# Patient Record
Sex: MALE | Race: BLACK OR AFRICAN AMERICAN | Employment: OTHER | URBAN - METROPOLITAN AREA
[De-identification: names, ages, dates, MRNs, and addresses within clinical notes are randomized per-mention and may not be internally consistent; named-entity substitution may affect disease eponyms.]

---

## 2018-09-07 ENCOUNTER — HOSPITAL ENCOUNTER (OUTPATIENT)
Dept: GENERAL RADIOLOGY | Age: 68
Discharge: HOME | End: 2018-09-07
Payer: MEDICARE

## 2018-09-07 DIAGNOSIS — M54.2 NECK PAIN: ICD-10-CM

## 2018-09-07 PROCEDURE — 72050 X-RAY EXAM NECK SPINE 4/5VWS: CPT

## 2020-11-08 ENCOUNTER — HOSPITAL ENCOUNTER (OUTPATIENT)
Dept: PREADMISSION TESTING | Age: 70
Discharge: HOME | End: 2020-11-08
Attending: INTERNAL MEDICINE
Payer: MEDICARE

## 2020-11-08 ENCOUNTER — TRANSCRIBE ORDER (OUTPATIENT)
Dept: REGISTRATION | Age: 70
End: 2020-11-08

## 2020-11-08 DIAGNOSIS — Z01.812 PRE-PROCEDURE LAB EXAM: Primary | ICD-10-CM

## 2020-11-08 DIAGNOSIS — Z01.812 PRE-PROCEDURE LAB EXAM: ICD-10-CM

## 2020-11-08 PROCEDURE — 87635 SARS-COV-2 COVID-19 AMP PRB: CPT

## 2020-11-09 LAB — SARS-COV-2, COV2NT: NOT DETECTED

## 2020-11-11 ENCOUNTER — HOSPITAL ENCOUNTER (OUTPATIENT)
Age: 70
Setting detail: OUTPATIENT SURGERY
Discharge: HOME | End: 2020-11-11
Attending: INTERNAL MEDICINE | Admitting: INTERNAL MEDICINE
Payer: MEDICARE

## 2020-11-11 ENCOUNTER — ANESTHESIA EVENT (OUTPATIENT)
Dept: ENDOSCOPY | Age: 70
End: 2020-11-11
Payer: MEDICARE

## 2020-11-11 ENCOUNTER — ANESTHESIA (OUTPATIENT)
Dept: ENDOSCOPY | Age: 70
End: 2020-11-11
Payer: MEDICARE

## 2020-11-11 VITALS
SYSTOLIC BLOOD PRESSURE: 120 MMHG | WEIGHT: 196.8 LBS | BODY MASS INDEX: 29.15 KG/M2 | DIASTOLIC BLOOD PRESSURE: 63 MMHG | OXYGEN SATURATION: 94 % | HEART RATE: 67 BPM | RESPIRATION RATE: 20 BRPM | HEIGHT: 69 IN | TEMPERATURE: 96.9 F

## 2020-11-11 PROCEDURE — 76060000031 HC ANESTHESIA FIRST 0.5 HR: Performed by: INTERNAL MEDICINE

## 2020-11-11 PROCEDURE — 2709999900 HC NON-CHARGEABLE SUPPLY: Performed by: INTERNAL MEDICINE

## 2020-11-11 PROCEDURE — 76040000019: Performed by: INTERNAL MEDICINE

## 2020-11-11 PROCEDURE — 77030013992 HC SNR POLYP ENDOSC BSC -B: Performed by: INTERNAL MEDICINE

## 2020-11-11 PROCEDURE — 74011250636 HC RX REV CODE- 250/636: Performed by: NURSE ANESTHETIST, CERTIFIED REGISTERED

## 2020-11-11 PROCEDURE — 74011000250 HC RX REV CODE- 250: Performed by: NURSE ANESTHETIST, CERTIFIED REGISTERED

## 2020-11-11 PROCEDURE — 74011250637 HC RX REV CODE- 250/637: Performed by: INTERNAL MEDICINE

## 2020-11-11 PROCEDURE — 88305 TISSUE EXAM BY PATHOLOGIST: CPT

## 2020-11-11 RX ORDER — MIDAZOLAM HYDROCHLORIDE 1 MG/ML
.25-5 INJECTION, SOLUTION INTRAMUSCULAR; INTRAVENOUS
Status: DISCONTINUED | OUTPATIENT
Start: 2020-11-11 | End: 2020-11-11 | Stop reason: HOSPADM

## 2020-11-11 RX ORDER — SODIUM CHLORIDE 0.9 % (FLUSH) 0.9 %
5-40 SYRINGE (ML) INJECTION AS NEEDED
Status: DISCONTINUED | OUTPATIENT
Start: 2020-11-11 | End: 2020-11-11 | Stop reason: HOSPADM

## 2020-11-11 RX ORDER — EPINEPHRINE 0.1 MG/ML
1 INJECTION INTRACARDIAC; INTRAVENOUS
Status: DISCONTINUED | OUTPATIENT
Start: 2020-11-11 | End: 2020-11-11 | Stop reason: HOSPADM

## 2020-11-11 RX ORDER — FLUMAZENIL 0.1 MG/ML
0.2 INJECTION INTRAVENOUS
Status: DISCONTINUED | OUTPATIENT
Start: 2020-11-11 | End: 2020-11-11 | Stop reason: HOSPADM

## 2020-11-11 RX ORDER — FENTANYL CITRATE 50 UG/ML
12.5-2 INJECTION, SOLUTION INTRAMUSCULAR; INTRAVENOUS
Status: DISCONTINUED | OUTPATIENT
Start: 2020-11-11 | End: 2020-11-11 | Stop reason: HOSPADM

## 2020-11-11 RX ORDER — PROPOFOL 10 MG/ML
INJECTION, EMULSION INTRAVENOUS AS NEEDED
Status: DISCONTINUED | OUTPATIENT
Start: 2020-11-11 | End: 2020-11-11 | Stop reason: HOSPADM

## 2020-11-11 RX ORDER — ATROPINE SULFATE 0.1 MG/ML
0.5 INJECTION INTRAVENOUS
Status: DISCONTINUED | OUTPATIENT
Start: 2020-11-11 | End: 2020-11-11 | Stop reason: HOSPADM

## 2020-11-11 RX ORDER — SODIUM CHLORIDE 9 MG/ML
INJECTION, SOLUTION INTRAVENOUS
Status: DISCONTINUED | OUTPATIENT
Start: 2020-11-11 | End: 2020-11-11 | Stop reason: HOSPADM

## 2020-11-11 RX ORDER — LIDOCAINE HYDROCHLORIDE 20 MG/ML
INJECTION, SOLUTION EPIDURAL; INFILTRATION; INTRACAUDAL; PERINEURAL AS NEEDED
Status: DISCONTINUED | OUTPATIENT
Start: 2020-11-11 | End: 2020-11-11 | Stop reason: HOSPADM

## 2020-11-11 RX ORDER — SODIUM CHLORIDE 9 MG/ML
75 INJECTION, SOLUTION INTRAVENOUS CONTINUOUS
Status: DISCONTINUED | OUTPATIENT
Start: 2020-11-11 | End: 2020-11-11 | Stop reason: HOSPADM

## 2020-11-11 RX ORDER — DEXTROMETHORPHAN/PSEUDOEPHED 2.5-7.5/.8
1.2 DROPS ORAL
Status: DISCONTINUED | OUTPATIENT
Start: 2020-11-11 | End: 2020-11-11 | Stop reason: HOSPADM

## 2020-11-11 RX ORDER — SODIUM CHLORIDE 0.9 % (FLUSH) 0.9 %
5-40 SYRINGE (ML) INJECTION EVERY 8 HOURS
Status: DISCONTINUED | OUTPATIENT
Start: 2020-11-11 | End: 2020-11-11 | Stop reason: HOSPADM

## 2020-11-11 RX ORDER — NALOXONE HYDROCHLORIDE 0.4 MG/ML
0.4 INJECTION, SOLUTION INTRAMUSCULAR; INTRAVENOUS; SUBCUTANEOUS
Status: DISCONTINUED | OUTPATIENT
Start: 2020-11-11 | End: 2020-11-11 | Stop reason: HOSPADM

## 2020-11-11 RX ADMIN — SODIUM CHLORIDE: 900 INJECTION, SOLUTION INTRAVENOUS at 10:14

## 2020-11-11 RX ADMIN — PROPOFOL 150 MG: 10 INJECTION, EMULSION INTRAVENOUS at 10:28

## 2020-11-11 RX ADMIN — PROPOFOL 50 MG: 10 INJECTION, EMULSION INTRAVENOUS at 10:34

## 2020-11-11 RX ADMIN — LIDOCAINE HYDROCHLORIDE 60 MG: 20 INJECTION, SOLUTION EPIDURAL; INFILTRATION; INTRACAUDAL; PERINEURAL at 10:28

## 2020-11-11 RX ADMIN — PROPOFOL 50 MG: 10 INJECTION, EMULSION INTRAVENOUS at 10:38

## 2020-11-11 NOTE — ANESTHESIA PREPROCEDURE EVALUATION
Relevant Problems   No relevant active problems       Anesthetic History   No history of anesthetic complications            Review of Systems / Medical History  Patient summary reviewed, nursing notes reviewed and pertinent labs reviewed    Pulmonary  Within defined limits                 Neuro/Psych   Within defined limits    CVA       Cardiovascular  Within defined limits            Hyperlipidemia         GI/Hepatic/Renal  Within defined limits              Endo/Other  Within defined limits  Diabetes         Other Findings              Physical Exam    Airway  Mallampati: II  TM Distance: > 6 cm  Neck ROM: normal range of motion   Mouth opening: Normal     Cardiovascular  Regular rate and rhythm,  S1 and S2 normal,  no murmur, click, rub, or gallop             Dental  No notable dental hx       Pulmonary  Breath sounds clear to auscultation               Abdominal  GI exam deferred       Other Findings            Anesthetic Plan    ASA: 3  Anesthesia type: MAC            Anesthetic plan and risks discussed with: Patient

## 2020-11-11 NOTE — ANESTHESIA POSTPROCEDURE EVALUATION
Procedure(s):  COLONOSCOPY  ENDOSCOPIC POLYPECTOMY. MAC    <BSHSIANPOST>    INITIAL Post-op Vital signs: No vitals data found for the desired time range. Were these all sent to me already?  SM

## 2020-11-11 NOTE — PROGRESS NOTES

## 2020-11-11 NOTE — PROCEDURES
[address], [address] [place] [address]      Colonoscopy Operative Report    Sanjeevgianni Cartagena  [id number]  1950      Procedure Type:   Colonoscopy with polypectomy (cold snare)     Indications:  Personal history of colon polyps. Last colonoscopy 2015     Pre-operative Diagnosis: see indication above    Post-operative Diagnosis:  See findings below    :  Gray Still MD    Surgical Assistant: None    Implants:  None    Referring Provider: Je Farnsworth MD      Sedation:  MAC anesthesia      Procedure Details:  After informed consent was obtained with all risks and benefits of procedure explained and preoperative exam completed, the patient was taken to the endoscopy suite and placed in the left lateral decubitus position. Upon sequential sedation as per above, a digital rectal exam was performed demonstrating internal hemorrhoids. The Olympus videocolonoscope  was inserted in the rectum and carefully advanced to the cecum, which was identified by the ileocecal valve and appendiceal orifice. The cecum was identified by the ileocecal valve and appendiceal orifice. The quality of preparation was fair, however adequate views obtained with lavage. The colonoscope was slowly withdrawn with careful evaluation between folds. Retroflexion in the rectum was completed . Findings:   Rectum: Grade 2 internal hemorrhoid(s); Sigmoid:     - Diverticulosis  Descending Colon:     - Diverticulosis  Transverse Colon: 2  Sessile polyp(s), the largest 7 mm in size;  Ascending Colon: normal  Cecum: normal  Terminal Ileum: not intubated    Specimen Removed:  specimen #1, 6-7 mm in size, located in the transverse colon removed by cold snare and retrieved for pathology    Complications: None.      EBL:  Minimal.    Impression:      -Two transverse colon polyps (6-7 mm in size), removed with cold snare and sent for pathology  -Moderate diverticulosis throughout the colon, predominantly in the left colon  -Small internal hemorrhoids    Recommendations:   -Await pathology results  -Repeat colonoscopy in 5 yrs based on pathology of polyps  -High fiber diet, start fiber supplement daily  -Call my office if any issues with hemorrhoids, bleeding, itching, weeping, pain.        Signed By: Veronica Fernando MD     11/11/2020  10:47 AM

## 2020-11-11 NOTE — DISCHARGE INSTRUCTIONS
[facility] [place]    COLON DISCHARGE INSTRUCTIONS    Redd Vieira  [id number]  1950    Discomfort:  Redness at IV site- apply warm compress to area; if redness or soreness persist- contact your physician  There may be a slight amount of blood passed from the rectum  Gaseous discomfort- walking, belching will help relieve any discomfort  You may not operate a vehicle for 12 hours  You may not engage in an occupation involving machinery or appliances for rest of today  You may not drink alcoholic beverages for at least 12 hours  Avoid making any critical decisions for at least 24 hour  DIET:  High fiber diet (see below)     ACTIVITY:  You may  resume your normal daily activities it is recommended that you spend the remainder of the day resting -  avoid any strenuous activity. CALL M.D. ANY SIGN OF:   Increasing pain, nausea, vomiting  Abdominal distension (swelling)  New increased bleeding (oral or rectal)  Fever (chills)  Pain in chest area  Bloody discharge from nose or mouth  Shortness of breath      Follow-up Instructions:   Call Dr. Colie Fothergill for any questions or problems at [phone]      ENDOSCOPY FINDINGS:    Impression:      -Two transverse colon polyps (6-7 mm in size), removed with cold snare and sent for pathology  -Moderate diverticulosis throughout the colon, predominantly in the left colon  -Small internal hemorrhoids    Recommendations:   -Await pathology results  -Repeat colonoscopy in 5 yrs based on pathology of polyps  -High fiber diet, start fiber supplement daily  -Call my office if any issues with hemorrhoids, bleeding, itching, weeping, pain.        Signed By: Susana James MD     11/11/2020  10:51 AM       DISCHARGE SUMMARY from Nurse    The following personal items collected during your admission are returned to you:   Dental Appliance: Dental Appliances: Lowers, Uppers  Vision: Visual Aid: At home  Hearing Aid:    Jewelry: Clothing:    Other Valuables:    Valuables sent to safe:        Learning About Coronavirus (COVID-19)  Coronavirus [phone]): Overview  What is coronavirus (COVID-19)? The coronavirus disease (COVID-19) is caused by a virus. It is an illness that was first found in [place], [place], in December 2019. It has since spread worldwide. The virus can cause fever, cough, and trouble breathing. In severe cases, it can cause pneumonia and make it hard to breathe without help. It can cause death. Coronaviruses are a large group of viruses. They cause the common cold. They also cause more serious illnesses like Middle East respiratory syndrome (MERS) and severe acute respiratory syndrome (SARS). COVID-19 is caused by a novel coronavirus. That means it's a new type that has not been seen in people before. This virus spreads person-to-person through droplets from coughing and sneezing. It can also spread when you are close to someone who is infected. And it can spread when you touch something that has the virus on it, such as a doorknob or a tabletop. What can you do to protect yourself from coronavirus (COVID-19)? The best way to protect yourself from getting sick is to:  · Avoid areas where there is an outbreak. · Avoid contact with people who may be infected. · Wash your hands often with soap or alcohol-based hand sanitizers. · Avoid crowds and try to stay at least 6 feet away from other people. · Wash your hands often, especially after you cough or sneeze. Use soap and water, and scrub for at least 20 seconds. If soap and water aren't available, use an alcohol-based hand . · Avoid touching your mouth, nose, and eyes. What can you do to avoid spreading the virus to others? To help avoid spreading the virus to others:  · Cover your mouth with a tissue when you cough or sneeze. Then throw the tissue in the trash. · Use a disinfectant to clean things that you touch often.   · Stay home if you are sick or have been exposed to the virus. Don't go to school, work, or public areas. And don't use public transportation. · If you are sick:  ? Leave your home only if you need to get medical care. But call the doctor's office first so they know you're coming. And wear a face mask, if you have one.  ? If you have a face mask, wear it whenever you're around other people. It can help stop the spread of the virus when you cough or sneeze. ? Clean and disinfect your home every day. Use household  and disinfectant wipes or sprays. Take special care to clean things that you grab with your hands. These include doorknobs, remote controls, phones, and handles on your refrigerator and microwave. And don't forget countertops, tabletops, bathrooms, and computer keyboards. When to call for help  Call 911 anytime you think you may need emergency care. For example, call if:  · You have severe trouble breathing. (You can't talk at all.)  · You have constant chest pain or pressure. · You are severely dizzy or lightheaded. · You are confused or can't think clearly. · Your face and lips have a blue color. · You pass out (lose consciousness) or are very hard to wake up. Call your doctor now if you develop symptoms such as:  · Shortness of breath. · Fever. · Cough. If you need to get care, call ahead to the doctor's office for instructions before you go. Make sure you wear a face mask, if you have one, to prevent exposing other people to the virus. Where can you get the latest information? The following health organizations are tracking and studying this virus. Their websites contain the most up-to-date information. Lizzy Jo also learn what to do if you think you may have been exposed to the virus. · U.S. Centers for Disease Control and Prevention (CDC): The CDC provides updated news about the disease and travel advice. The website also tells you how to prevent the spread of infection.  www.cdc.gov  · World Health Organization [facility]): WHO offers information about the virus outbreaks. WHO also has travel advice. www.who.int  Current as of: April 1, 2020               Content Version: 12.4  © 2006-2020 Healthwise, Incorporated. Care instructions adapted under license by your healthcare professional. If you have questions about a medical condition or this instruction, always ask your healthcare professional. [address] any warranty or liability for your use of this information. Patient Education        High-Fiber Diet: Care Instructions  Your Care Instructions     A high-fiber diet may help you relieve constipation and feel less bloated. Your doctor and dietitian will help you make a high-fiber eating plan based on your personal needs. The plan will include the things you like to eat. It will also make sure that you get 30 grams of fiber a day. Before you make changes to the way you eat, be sure to talk with your doctor or dietitian. Follow-up care is a key part of your treatment and safety. Be sure to make and go to all appointments, and call your doctor if you are having problems. It's also a good idea to know your test results and keep a list of the medicines you take. How can you care for yourself at home? · You can increase how much fiber you get if you eat more of certain foods. These foods include:  ? Whole-grain breads and cereals. ? Fruits, such as pears, apples, and peaches. Eat the skins, peels, and seeds, if you can.  ? Vegetables, such as broccoli, cabbage, spinach, carrots, asparagus, and squash. ? Starchy vegetables. These include potatoes with skins, kidney beans, and lima beans. · Take a fiber supplement every day if your doctor recommends it. Examples are Benefiber, Citrucel, FiberCon, and Metamucil. Ask your doctor how much to take. · Drink plenty of fluids, enough so that your urine is light yellow or clear like water.  If you have kidney, heart, or liver disease and have to limit fluids, talk with your doctor before you increase the amount of fluids you drink. · Get some exercise every day. Exercise helps stool move through the colon. It also helps prevent constipation. · Keep a food diary. Try to notice and write down what foods cause gas, pain, or other symptoms. Then you can avoid these foods. Where can you learn more? Go to http://www.gray.com/  Enter R242 in the search box to learn more about \"High-Fiber Diet: Care Instructions. \"  Current as of: August 22, 2019               Content Version: 12.6  © 3097-9629 [employer]. Care instructions adapted under license by [employer] (which disclaims liability or warranty for this information). If you have questions about a medical condition or this instruction, always ask your healthcare professional. [address] any warranty or liability for your use of this information. Patient Education        Diverticulosis: Care Instructions  Your Care Instructions  In diverticulosis, pouches called diverticula form in the wall of the large intestine (colon). The pouches do not cause any pain or other symptoms. Most people who have diverticulosis do not know they have it. But the pouches sometimes bleed, and if they become infected, they can cause pain and other symptoms. When this happens, it is called diverticulitis. Diverticula form when pressure pushes the wall of the colon outward at certain weak points. A diet that is too low in fiber can cause diverticula. Follow-up care is a key part of your treatment and safety. Be sure to make and go to all appointments, and call your doctor if you are having problems. It's also a good idea to know your test results and keep a list of the medicines you take. How can you care for yourself at home? · Include fruits, leafy green vegetables, beans, and whole grains in your diet each day. These foods are high in fiber.   · Take a fiber supplement, such as Citrucel or Metamucil, every day if needed. Read and follow all instructions on the label. · Drink plenty of fluids, enough so that your urine is light yellow or clear like water. If you have kidney, heart, or liver disease and have to limit fluids, talk with your doctor before you increase the amount of fluids you drink. · Get at least 30 minutes of exercise on most days of the week. Walking is a good choice. You also may want to do other activities, such as running, swimming, cycling, or playing tennis or team sports. · Cut out foods that cause gas, pain, or other symptoms. When should you call for help? Call your doctor now or seek immediate medical care if:    · You have belly pain.     · You pass maroon or very bloody stools.     · You have a fever.     · You have nausea and vomiting.     · You have unusual changes in your bowel movements or abdominal swelling.     · You have burning pain when you urinate.     · You have abnormal vaginal discharge.     · You have shoulder pain.     · You have cramping pain that does not get better when you have a bowel movement or pass gas.     · You pass gas or stool from your urethra while urinating. Watch closely for changes in your health, and be sure to contact your doctor if you have any problems. Where can you learn more? Go to http://www.gray.com/  Enter U058080 in the search box to learn more about \"Diverticulosis: Care Instructions. \"  Current as of: April 15, 2020               Content Version: 12.6  © 9316-2537 [employer] Inc[employer]. Care instructions adapted under license by [employer] (which disclaims liability or warranty for this information). If you have questions about a medical condition or this instruction, always ask your healthcare professional. [place] [address] any warranty or liability for your use of this information.        Patient Education        Hemorrhoids: Care Instructions  Your Care Instructions     Hemorrhoids are enlarged veins that develop in the anal canal. Bleeding during bowel movements, itching, swelling, and rectal pain are the most common symptoms. They can be uncomfortable at times, but hemorrhoids rarely are a serious problem. You can treat most hemorrhoids with simple changes to your diet and bowel habits. These changes include eating more fiber and not straining to pass stools. Most hemorrhoids do not need surgery or other treatment unless they are very large and painful or bleed a lot. Follow-up care is a key part of your treatment and safety. Be sure to make and go to all appointments, and call your doctor if you are having problems. It's also a good idea to know your test results and keep a list of the medicines you take. How can you care for yourself at home? · Sit in a few inches of warm water (sitz bath) 3 times a day and after bowel movements. The warm water helps with pain and itching. · Put ice on your anal area several times a day for 10 minutes at a time. Put a thin cloth between the ice and your skin. Follow this by placing a warm, wet towel on the area for another 10 to 20 minutes. · Take pain medicines exactly as directed. ? If the doctor gave you a prescription medicine for pain, take it as prescribed. ? If you are not taking a prescription pain medicine, ask your doctor if you can take an over-the-counter medicine. · Keep the anal area clean, but be gentle. Use water and a fragrance-free soap, such as [place], or use baby wipes or medicated pads, such as Tucks. · Wear cotton underwear and loose clothing to decrease moisture in the anal area. · Eat more fiber. Include foods such as whole-grain breads and cereals, raw vegetables, raw and dried fruits, and beans. · Drink plenty of fluids, enough so that your urine is light yellow or clear like water.  If you have kidney, heart, or liver disease and have to limit fluids, talk with your doctor before you increase the amount of fluids you drink. · Use a stool softener that contains bran or psyllium. You can save money by buying bran or psyllium (available in bulk at most health food stores) and sprinkling it on foods or stirring it into fruit juice. Or you can use a product such as Metamucil or Hydrocil. · Practice healthy bowel habits. ? Go to the bathroom as soon as you have the urge. ? Avoid straining to pass stools. Relax and give yourself time to let things happen naturally. ? Do not hold your breath while passing stools. ? Do not read while sitting on the toilet. Get off the toilet as soon as you have finished. · Take your medicines exactly as prescribed. Call your doctor if you think you are having a problem with your medicine. When should you call for help? Call 911 anytime you think you may need emergency care. For example, call if:    · You pass maroon or very bloody stools. Call your doctor now or seek immediate medical care if:    · You have increased pain.     · You have increased bleeding. Watch closely for changes in your health, and be sure to contact your doctor if:    · Your symptoms have not improved after 3 or 4 days. Where can you learn more? Go to http://www.Borqs.com/  Enter F228 in the search box to learn more about \"Hemorrhoids: Care Instructions. \"  Current as of: April 15, 2020               Content Version: 12.6  © 0430-3576 [employer]. Care instructions adapted under license by [employer] (which disclaims liability or warranty for this information). If you have questions about a medical condition or this instruction, always ask your healthcare professional. [place] [address] any warranty or liability for your use of this information.

## 2020-11-11 NOTE — H&P
[address]  [address] [facility], [address] [place] [address]  [phone]        History and Physical     NAME: Cecelia Domínguez. :  1950   MRN:  [id number]         No past surgical history on file. Past Medical History:   Diagnosis Date    Diabetes ([address]     H/O echocardiogram     50-55%  small apical defect    Hypercholesterolemia     Stroke [facility])      Social History     Tobacco Use    Smoking status: Former Smoker     Packs/day: 0.25    Smokeless tobacco: Never Used   Substance Use Topics    Alcohol use: No    Drug use: No     No Known Allergies  No family history on file. Current Facility-Administered Medications   Medication Dose Route Frequency    0.9% sodium chloride infusion  75 mL/hr IntraVENous CONTINUOUS    sodium chloride (NS) flush 5-40 mL  5-40 mL IntraVENous Q8H    sodium chloride (NS) flush 5-40 mL  5-40 mL IntraVENous PRN    midazolam (VERSED) injection 0.25-5 mg  0.25-5 mg IntraVENous Multiple    fentaNYL citrate (PF) injection 12.5-200 mcg  12.5-200 mcg IntraVENous Multiple    naloxone (NARCAN) injection 0.4 mg  0.4 mg IntraVENous Multiple    flumazeniL (ROMAZICON) 0.1 mg/mL injection 0.2 mg  0.2 mg IntraVENous Multiple    simethicone (MYLICON) 67LP/6.6BS oral drops 80 mg  1.2 mL Oral Multiple    atropine injection 0.5 mg  0.5 mg IntraVENous ONCE PRN    EPINEPHrine (ADRENALIN) 0.1 mg/mL syringe 1 mg  1 mg Endoscopically ONCE PRN         PHYSICAL EXAM:    BP (!) 155/65   Pulse 64   Temp 98.7 °F (37.1 °C)   Resp 20   Ht 5' 9\" (1.753 m)   Wt 89.3 kg (196 lb 12.8 oz)   SpO2 98%   BMI 29.06 kg/m²   General: WD, WN. Alert, cooperative, no acute distress    HEENT: NC, Atraumatic. PERRLA, EOMI. Anicteric sclerae. Lungs:  CTA Bilaterally. No Wheezing/Rhonchi/Rales. Heart:  Regular  rhythm,  No murmur, No Rubs, No Gallops  Abdomen: Soft, Non distended, Non tender.   +Bowel sounds, no HSM  Extremities: No c/c/e  Neurologic:  CN 2-12 gi, Alert and oriented X 3. No acute neurological distress (no residual weakness from stroke)  Psych:   Good insight. Not anxious nor agitated.            Assessment:   · Personal h/o colon polyps  · Last colonoscopy 2015    Plan:   · Endoscopic procedure: Colonoscopy  · Mac

## 2020-11-11 NOTE — ROUTINE PROCESS
Catalino Kessler. 
1950 
[id number] Situation: 
Verbal report received from: Edgar Medrano Procedure: Procedure(s): 
COLONOSCOPY 
ENDOSCOPIC POLYPECTOMY Background: 
 
Preoperative diagnosis: hx of polyp Postoperative diagnosis: 1. Diverticulosis 2. Hemorrhoids 3. Colon Polyps :  Dr. Laura Mi Assistant(s): Endoscopy Technician-1: Ancelmo Reyes Endoscopy RN-1: Felisa White RN Specimens:  
ID Type Source Tests Collected by Time Destination 1 : Transverse Colon Polyp Preservative   Irving Ca MD 11/11/2020 1034 Pathology H. Pylori  no Assessment: 
Intra-procedure medications Anesthesia gave intra-procedure sedation and medications, see anesthesia flow sheet yes Intravenous fluids: NS@ [facility] Vital signs stable Abdominal assessment: round and soft Recommendation: 
Discharge patient per MD order. Family or Friend Spouse Deandra Pan Permission to share finding with family or friend yes

## 2021-05-12 ENCOUNTER — TRANSCRIBE ORDER (OUTPATIENT)
Dept: SCHEDULING | Age: 71
End: 2021-05-12

## 2021-05-12 ENCOUNTER — HOSPITAL ENCOUNTER (OUTPATIENT)
Dept: VASCULAR SURGERY | Age: 71
Discharge: HOME | End: 2021-05-12
Payer: MEDICARE

## 2021-05-12 DIAGNOSIS — M25.473 ANKLE SWELLING: Primary | ICD-10-CM

## 2021-05-12 DIAGNOSIS — M25.473 ANKLE SWELLING: ICD-10-CM

## 2021-05-12 PROCEDURE — 93970 EXTREMITY STUDY: CPT

## 2021-07-19 ENCOUNTER — HOSPITAL ENCOUNTER (EMERGENCY)
Age: 71
Discharge: HOME | DRG: 871 | End: 2021-07-20
Attending: EMERGENCY MEDICINE | Admitting: EMERGENCY MEDICINE
Payer: MEDICARE

## 2021-07-19 DIAGNOSIS — N30.00 ACUTE CYSTITIS WITHOUT HEMATURIA: Primary | ICD-10-CM

## 2021-07-19 LAB
ALBUMIN SERPL-MCNC: 3.6 G/DL (ref 3.5–5)
ALBUMIN/GLOB SERPL: 0.9 {RATIO} (ref 1.1–2.2)
ALP SERPL-CCNC: 115 U/L (ref 45–117)
ALT SERPL-CCNC: 21 U/L (ref 12–78)
ANION GAP SERPL CALC-SCNC: 7 MMOL/L (ref 5–15)
AST SERPL-CCNC: 13 U/L (ref 15–37)
BASOPHILS # BLD: 0 K/UL (ref 0–0.1)
BASOPHILS NFR BLD: 0 % (ref 0–1)
BILIRUB SERPL-MCNC: 1 MG/DL (ref 0.2–1)
BUN SERPL-MCNC: 21 MG/DL (ref 6–20)
BUN/CREAT SERPL: 13 (ref 12–20)
CALCIUM SERPL-MCNC: 9 MG/DL (ref 8.5–10.1)
CHLORIDE SERPL-SCNC: 106 MMOL/L (ref 97–108)
CO2 SERPL-SCNC: 24 MMOL/L (ref 21–32)
COMMENT, HOLDF: NORMAL
CREAT SERPL-MCNC: 1.57 MG/DL (ref 0.7–1.3)
DIFFERENTIAL METHOD BLD: ABNORMAL
EOSINOPHIL # BLD: 0 K/UL (ref 0–0.4)
EOSINOPHIL NFR BLD: 0 % (ref 0–7)
ERYTHROCYTE [DISTWIDTH] IN BLOOD BY AUTOMATED COUNT: 14.2 % (ref 11.5–14.5)
GLOBULIN SER CALC-MCNC: 4 G/DL (ref 2–4)
GLUCOSE SERPL-MCNC: 199 MG/DL (ref 65–100)
HCT VFR BLD AUTO: 46.1 % (ref 36.6–50.3)
HGB BLD-MCNC: 15.7 G/DL (ref 12.1–17)
IMM GRANULOCYTES # BLD AUTO: 0.1 K/UL (ref 0–0.04)
IMM GRANULOCYTES NFR BLD AUTO: 1 % (ref 0–0.5)
LYMPHOCYTES # BLD: 0.7 K/UL (ref 0.8–3.5)
LYMPHOCYTES NFR BLD: 5 % (ref 12–49)
MCH RBC QN AUTO: 31.3 PG (ref 26–34)
MCHC RBC AUTO-ENTMCNC: 34.1 G/DL (ref 30–36.5)
MCV RBC AUTO: 91.8 FL (ref 80–99)
MONOCYTES # BLD: 1 K/UL (ref 0–1)
MONOCYTES NFR BLD: 7 % (ref 5–13)
NEUTS SEG # BLD: 12.9 K/UL (ref 1.8–8)
NEUTS SEG NFR BLD: 87 % (ref 32–75)
NRBC # BLD: 0 K/UL (ref 0–0.01)
NRBC BLD-RTO: 0 PER 100 WBC
PLATELET # BLD AUTO: 153 K/UL (ref 150–400)
PMV BLD AUTO: 11.7 FL (ref 8.9–12.9)
POTASSIUM SERPL-SCNC: 3.6 MMOL/L (ref 3.5–5.1)
PROT SERPL-MCNC: 7.6 G/DL (ref 6.4–8.2)
RBC # BLD AUTO: 5.02 M/UL (ref 4.1–5.7)
RBC MORPH BLD: ABNORMAL
SAMPLES BEING HELD,HOLD: NORMAL
SODIUM SERPL-SCNC: 137 MMOL/L (ref 136–145)
TROPONIN I SERPL-MCNC: <0.05 NG/ML
WBC # BLD AUTO: 14.7 K/UL (ref 4.1–11.1)

## 2021-07-19 PROCEDURE — 99285 EMERGENCY DEPT VISIT HI MDM: CPT

## 2021-07-19 PROCEDURE — 36415 COLL VENOUS BLD VENIPUNCTURE: CPT

## 2021-07-19 PROCEDURE — 85025 COMPLETE CBC W/AUTO DIFF WBC: CPT

## 2021-07-19 PROCEDURE — 96361 HYDRATE IV INFUSION ADD-ON: CPT

## 2021-07-19 PROCEDURE — 96365 THER/PROPH/DIAG IV INF INIT: CPT

## 2021-07-19 PROCEDURE — 80053 COMPREHEN METABOLIC PANEL: CPT

## 2021-07-19 PROCEDURE — 74011250636 HC RX REV CODE- 250/636: Performed by: EMERGENCY MEDICINE

## 2021-07-19 PROCEDURE — 93005 ELECTROCARDIOGRAM TRACING: CPT

## 2021-07-19 PROCEDURE — 84484 ASSAY OF TROPONIN QUANT: CPT

## 2021-07-19 RX ADMIN — SODIUM CHLORIDE 1000 ML: 9 INJECTION, SOLUTION INTRAVENOUS at 23:29

## 2021-07-20 ENCOUNTER — APPOINTMENT (OUTPATIENT)
Dept: VASCULAR SURGERY | Age: 71
DRG: 871 | End: 2021-07-20
Attending: FAMILY MEDICINE
Payer: MEDICARE

## 2021-07-20 ENCOUNTER — HOSPITAL ENCOUNTER (EMERGENCY)
Dept: CT IMAGING | Age: 71
Discharge: HOME | DRG: 871 | End: 2021-07-20
Attending: STUDENT IN AN ORGANIZED HEALTH CARE EDUCATION/TRAINING PROGRAM
Payer: MEDICARE

## 2021-07-20 ENCOUNTER — APPOINTMENT (OUTPATIENT)
Dept: CT IMAGING | Age: 71
DRG: 871 | End: 2021-07-20
Attending: FAMILY MEDICINE
Payer: MEDICARE

## 2021-07-20 ENCOUNTER — APPOINTMENT (OUTPATIENT)
Dept: GENERAL RADIOLOGY | Age: 71
DRG: 871 | End: 2021-07-20
Attending: EMERGENCY MEDICINE
Payer: MEDICARE

## 2021-07-20 ENCOUNTER — HOSPITAL ENCOUNTER (INPATIENT)
Age: 71
LOS: 3 days | Discharge: HOME | DRG: 871 | End: 2021-07-23
Attending: STUDENT IN AN ORGANIZED HEALTH CARE EDUCATION/TRAINING PROGRAM | Admitting: FAMILY MEDICINE
Payer: MEDICARE

## 2021-07-20 VITALS
HEART RATE: 98 BPM | OXYGEN SATURATION: 96 % | RESPIRATION RATE: 16 BRPM | SYSTOLIC BLOOD PRESSURE: 138 MMHG | TEMPERATURE: 98 F | DIASTOLIC BLOOD PRESSURE: 82 MMHG

## 2021-07-20 DIAGNOSIS — N12 PYELONEPHRITIS: Primary | ICD-10-CM

## 2021-07-20 DIAGNOSIS — R78.81 BACTEREMIA: ICD-10-CM

## 2021-07-20 PROBLEM — G93.41 METABOLIC ENCEPHALOPATHY: Status: ACTIVE | Noted: 2021-07-20

## 2021-07-20 LAB
ANION GAP SERPL CALC-SCNC: 8 MMOL/L (ref 5–15)
APPEARANCE UR: ABNORMAL
ATRIAL RATE: 101 BPM
ATRIAL RATE: 68 BPM
BACTERIA URNS QL MICRO: ABNORMAL /HPF
BASOPHILS # BLD: 0 K/UL (ref 0–0.1)
BASOPHILS NFR BLD: 0 % (ref 0–1)
BILIRUB UR QL: NEGATIVE
BUN SERPL-MCNC: 26 MG/DL (ref 6–20)
BUN/CREAT SERPL: 15 (ref 12–20)
CALCIUM SERPL-MCNC: 8.7 MG/DL (ref 8.5–10.1)
CALCULATED R AXIS, ECG10: 48 DEGREES
CALCULATED R AXIS, ECG10: 52 DEGREES
CALCULATED T AXIS, ECG11: 59 DEGREES
CALCULATED T AXIS, ECG11: 59 DEGREES
CHLORIDE SERPL-SCNC: 109 MMOL/L (ref 97–108)
CHOLEST SERPL-MCNC: 127 MG/DL
CK SERPL-CCNC: 814 U/L (ref 39–308)
CK SERPL-CCNC: 950 U/L (ref 39–308)
CO2 SERPL-SCNC: 22 MMOL/L (ref 21–32)
COLOR UR: ABNORMAL
COMMENT, HOLDF: NORMAL
COMMENT, HOLDF: NORMAL
CREAT SERPL-MCNC: 1.79 MG/DL (ref 0.7–1.3)
DIAGNOSIS, 93000: NORMAL
DIAGNOSIS, 93000: NORMAL
DIFFERENTIAL METHOD BLD: ABNORMAL
EOSINOPHIL # BLD: 0 K/UL (ref 0–0.4)
EOSINOPHIL NFR BLD: 0 % (ref 0–7)
EPITH CASTS URNS QL MICRO: ABNORMAL /LPF
ERYTHROCYTE [DISTWIDTH] IN BLOOD BY AUTOMATED COUNT: 14.6 % (ref 11.5–14.5)
EST. AVERAGE GLUCOSE BLD GHB EST-MCNC: 157 MG/DL
GLUCOSE BLD STRIP.AUTO-MCNC: 181 MG/DL (ref 65–117)
GLUCOSE BLD STRIP.AUTO-MCNC: 193 MG/DL (ref 65–117)
GLUCOSE BLD STRIP.AUTO-MCNC: 220 MG/DL (ref 65–117)
GLUCOSE BLD STRIP.AUTO-MCNC: 239 MG/DL (ref 65–117)
GLUCOSE SERPL-MCNC: 218 MG/DL (ref 65–100)
GLUCOSE UR STRIP.AUTO-MCNC: NEGATIVE MG/DL
HBA1C MFR BLD: 7.1 % (ref 4–5.6)
HCT VFR BLD AUTO: 43.1 % (ref 36.6–50.3)
HDLC SERPL-MCNC: 41 MG/DL
HDLC SERPL: 3.1 {RATIO} (ref 0–5)
HGB BLD-MCNC: 14.7 G/DL (ref 12.1–17)
HGB UR QL STRIP: ABNORMAL
HYALINE CASTS URNS QL MICRO: ABNORMAL /LPF (ref 0–5)
IMM GRANULOCYTES # BLD AUTO: 0.1 K/UL (ref 0–0.04)
IMM GRANULOCYTES NFR BLD AUTO: 1 % (ref 0–0.5)
KETONES UR QL STRIP.AUTO: NEGATIVE MG/DL
LACTATE BLD-SCNC: 0.98 MMOL/L (ref 0.4–2)
LACTATE SERPL-SCNC: 1.8 MMOL/L (ref 0.4–2)
LACTATE SERPL-SCNC: 2 MMOL/L (ref 0.4–2)
LACTATE SERPL-SCNC: 2.1 MMOL/L (ref 0.4–2)
LACTATE SERPL-SCNC: 2.8 MMOL/L (ref 0.4–2)
LDLC SERPL CALC-MCNC: 65.2 MG/DL (ref 0–100)
LEUKOCYTE ESTERASE UR QL STRIP.AUTO: ABNORMAL
LYMPHOCYTES # BLD: 0.5 K/UL (ref 0.8–3.5)
LYMPHOCYTES NFR BLD: 4 % (ref 12–49)
MAGNESIUM SERPL-MCNC: 1.6 MG/DL (ref 1.6–2.4)
MCH RBC QN AUTO: 31.3 PG (ref 26–34)
MCHC RBC AUTO-ENTMCNC: 34.1 G/DL (ref 30–36.5)
MCV RBC AUTO: 91.9 FL (ref 80–99)
MONOCYTES # BLD: 0.5 K/UL (ref 0–1)
MONOCYTES NFR BLD: 4 % (ref 5–13)
NEUTS SEG # BLD: 11.1 K/UL (ref 1.8–8)
NEUTS SEG NFR BLD: 91 % (ref 32–75)
NITRITE UR QL STRIP.AUTO: POSITIVE
NRBC # BLD: 0 K/UL (ref 0–0.01)
NRBC BLD-RTO: 0 PER 100 WBC
PH UR STRIP: 5.5 [PH] (ref 5–8)
PLATELET # BLD AUTO: 129 K/UL (ref 150–400)
PMV BLD AUTO: 11.6 FL (ref 8.9–12.9)
POTASSIUM SERPL-SCNC: 3.6 MMOL/L (ref 3.5–5.1)
PROCALCITONIN SERPL-MCNC: 36.29 NG/ML
PROT UR STRIP-MCNC: 30 MG/DL
Q-T INTERVAL, ECG07: 394 MS
Q-T INTERVAL, ECG07: 438 MS
QRS DURATION, ECG06: 72 MS
QRS DURATION, ECG06: 74 MS
QTC CALCULATION (BEZET), ECG08: 505 MS
QTC CALCULATION (BEZET), ECG08: 505 MS
RBC # BLD AUTO: 4.69 M/UL (ref 4.1–5.7)
RBC #/AREA URNS HPF: ABNORMAL /HPF (ref 0–5)
RBC MORPH BLD: ABNORMAL
SAMPLES BEING HELD,HOLD: NORMAL
SAMPLES BEING HELD,HOLD: NORMAL
SERVICE CMNT-IMP: ABNORMAL
SODIUM SERPL-SCNC: 139 MMOL/L (ref 136–145)
SP GR UR REFRACTOMETRY: 1.02 (ref 1–1.03)
TRIGL SERPL-MCNC: 104 MG/DL (ref ?–150)
TROPONIN I BLD-MCNC: <0.04 NG/ML (ref 0–0.08)
TROPONIN I SERPL-MCNC: 0.43 NG/ML
TROPONIN I SERPL-MCNC: 0.46 NG/ML
TROPONIN I SERPL-MCNC: 0.52 NG/ML
TROPONIN I SERPL-MCNC: <0.05 NG/ML
TSH SERPL DL<=0.05 MIU/L-ACNC: 0.59 UIU/ML (ref 0.36–3.74)
UROBILINOGEN UR QL STRIP.AUTO: 0.2 EU/DL (ref 0.2–1)
VENTRICULAR RATE, ECG03: 80 BPM
VENTRICULAR RATE, ECG03: 99 BPM
VIT B12 SERPL-MCNC: 277 PG/ML (ref 193–986)
VLDLC SERPL CALC-MCNC: 20.8 MG/DL
WBC # BLD AUTO: 12.2 K/UL (ref 4.1–11.1)
WBC URNS QL MICRO: ABNORMAL /HPF (ref 0–4)

## 2021-07-20 PROCEDURE — 74011636637 HC RX REV CODE- 636/637: Performed by: FAMILY MEDICINE

## 2021-07-20 PROCEDURE — 82607 VITAMIN B-12: CPT

## 2021-07-20 PROCEDURE — 83605 ASSAY OF LACTIC ACID: CPT

## 2021-07-20 PROCEDURE — 93005 ELECTROCARDIOGRAM TRACING: CPT

## 2021-07-20 PROCEDURE — 84443 ASSAY THYROID STIM HORMONE: CPT

## 2021-07-20 PROCEDURE — 36415 COLL VENOUS BLD VENIPUNCTURE: CPT

## 2021-07-20 PROCEDURE — 84484 ASSAY OF TROPONIN QUANT: CPT

## 2021-07-20 PROCEDURE — 74011250636 HC RX REV CODE- 250/636: Performed by: STUDENT IN AN ORGANIZED HEALTH CARE EDUCATION/TRAINING PROGRAM

## 2021-07-20 PROCEDURE — 83735 ASSAY OF MAGNESIUM: CPT

## 2021-07-20 PROCEDURE — 93970 EXTREMITY STUDY: CPT

## 2021-07-20 PROCEDURE — 74011250636 HC RX REV CODE- 250/636: Performed by: FAMILY MEDICINE

## 2021-07-20 PROCEDURE — 74176 CT ABD & PELVIS W/O CONTRAST: CPT

## 2021-07-20 PROCEDURE — 87186 SC STD MICRODIL/AGAR DIL: CPT

## 2021-07-20 PROCEDURE — 80048 BASIC METABOLIC PNL TOTAL CA: CPT

## 2021-07-20 PROCEDURE — 82550 ASSAY OF CK (CPK): CPT

## 2021-07-20 PROCEDURE — 65660000000 HC RM CCU STEPDOWN

## 2021-07-20 PROCEDURE — 74011000636 HC RX REV CODE- 636

## 2021-07-20 PROCEDURE — 80061 LIPID PANEL: CPT

## 2021-07-20 PROCEDURE — 84145 PROCALCITONIN (PCT): CPT

## 2021-07-20 PROCEDURE — 82962 GLUCOSE BLOOD TEST: CPT

## 2021-07-20 PROCEDURE — 87040 BLOOD CULTURE FOR BACTERIA: CPT

## 2021-07-20 PROCEDURE — 71275 CT ANGIOGRAPHY CHEST: CPT

## 2021-07-20 PROCEDURE — 99283 EMERGENCY DEPT VISIT LOW MDM: CPT

## 2021-07-20 PROCEDURE — 81001 URINALYSIS AUTO W/SCOPE: CPT

## 2021-07-20 PROCEDURE — 74011000258 HC RX REV CODE- 258: Performed by: EMERGENCY MEDICINE

## 2021-07-20 PROCEDURE — 70450 CT HEAD/BRAIN W/O DYE: CPT

## 2021-07-20 PROCEDURE — 74011250637 HC RX REV CODE- 250/637: Performed by: FAMILY MEDICINE

## 2021-07-20 PROCEDURE — 85025 COMPLETE CBC W/AUTO DIFF WBC: CPT

## 2021-07-20 PROCEDURE — 87077 CULTURE AEROBIC IDENTIFY: CPT

## 2021-07-20 PROCEDURE — 87086 URINE CULTURE/COLONY COUNT: CPT

## 2021-07-20 PROCEDURE — 83036 HEMOGLOBIN GLYCOSYLATED A1C: CPT

## 2021-07-20 PROCEDURE — 71046 X-RAY EXAM CHEST 2 VIEWS: CPT

## 2021-07-20 PROCEDURE — 74011250636 HC RX REV CODE- 250/636: Performed by: EMERGENCY MEDICINE

## 2021-07-20 RX ORDER — SODIUM CHLORIDE 0.9 % (FLUSH) 0.9 %
5-40 SYRINGE (ML) INJECTION EVERY 8 HOURS
Status: DISCONTINUED | OUTPATIENT
Start: 2021-07-20 | End: 2021-07-23 | Stop reason: HOSPADM

## 2021-07-20 RX ORDER — ASPIRIN 325 MG
325 TABLET, DELAYED RELEASE (ENTERIC COATED) ORAL 2 TIMES DAILY
Status: DISCONTINUED | OUTPATIENT
Start: 2021-07-20 | End: 2021-07-21 | Stop reason: DRUGHIGH

## 2021-07-20 RX ORDER — INSULIN LISPRO 100 [IU]/ML
INJECTION, SOLUTION INTRAVENOUS; SUBCUTANEOUS EVERY 6 HOURS
Status: DISCONTINUED | OUTPATIENT
Start: 2021-07-20 | End: 2021-07-23 | Stop reason: HOSPADM

## 2021-07-20 RX ORDER — GLIPIZIDE 10 MG/1
10 TABLET, FILM COATED, EXTENDED RELEASE ORAL DAILY
COMMUNITY
End: 2022-04-12

## 2021-07-20 RX ORDER — DEXTROSE 50 % IN WATER (D50W) INTRAVENOUS SYRINGE
25-50 AS NEEDED
Status: DISCONTINUED | OUTPATIENT
Start: 2021-07-20 | End: 2021-07-23 | Stop reason: HOSPADM

## 2021-07-20 RX ORDER — ONDANSETRON 2 MG/ML
4 INJECTION INTRAMUSCULAR; INTRAVENOUS
Status: DISCONTINUED | OUTPATIENT
Start: 2021-07-20 | End: 2021-07-23 | Stop reason: HOSPADM

## 2021-07-20 RX ORDER — ACETAMINOPHEN 500 MG
2000 TABLET ORAL DAILY
COMMUNITY
End: 2022-04-27

## 2021-07-20 RX ORDER — CLOPIDOGREL BISULFATE 75 MG/1
75 TABLET ORAL DAILY
Status: DISCONTINUED | OUTPATIENT
Start: 2021-07-21 | End: 2021-07-23 | Stop reason: HOSPADM

## 2021-07-20 RX ORDER — CELECOXIB 200 MG/1
200 CAPSULE ORAL
COMMUNITY
End: 2021-07-23

## 2021-07-20 RX ORDER — ACETAMINOPHEN 325 MG/1
650 TABLET ORAL
Status: DISCONTINUED | OUTPATIENT
Start: 2021-07-20 | End: 2021-07-23 | Stop reason: HOSPADM

## 2021-07-20 RX ORDER — PANTOPRAZOLE SODIUM 40 MG/1
40 TABLET, DELAYED RELEASE ORAL DAILY
COMMUNITY
End: 2021-07-23

## 2021-07-20 RX ORDER — MAGNESIUM SULFATE 100 %
4 CRYSTALS MISCELLANEOUS AS NEEDED
Status: DISCONTINUED | OUTPATIENT
Start: 2021-07-20 | End: 2021-07-23 | Stop reason: HOSPADM

## 2021-07-20 RX ORDER — ROSUVASTATIN CALCIUM 5 MG/1
5 TABLET, COATED ORAL
COMMUNITY
End: 2022-04-12

## 2021-07-20 RX ORDER — SODIUM CHLORIDE 0.9 % (FLUSH) 0.9 %
5-40 SYRINGE (ML) INJECTION AS NEEDED
Status: DISCONTINUED | OUTPATIENT
Start: 2021-07-20 | End: 2021-07-23 | Stop reason: HOSPADM

## 2021-07-20 RX ORDER — CEPHALEXIN 500 MG/1
500 CAPSULE ORAL 2 TIMES DAILY
Qty: 14 CAPSULE | Refills: 0 | Status: SHIPPED | OUTPATIENT
Start: 2021-07-20 | End: 2021-07-23

## 2021-07-20 RX ORDER — INSULIN GLARGINE 100 [IU]/ML
20 INJECTION, SOLUTION SUBCUTANEOUS
Status: DISCONTINUED | OUTPATIENT
Start: 2021-07-20 | End: 2021-07-21

## 2021-07-20 RX ORDER — CHLORTHALIDONE 25 MG/1
25 TABLET ORAL DAILY
COMMUNITY
End: 2021-07-23

## 2021-07-20 RX ORDER — SODIUM CHLORIDE 9 MG/ML
75 INJECTION, SOLUTION INTRAVENOUS CONTINUOUS
Status: DISCONTINUED | OUTPATIENT
Start: 2021-07-20 | End: 2021-07-23

## 2021-07-20 RX ORDER — HEPARIN SODIUM 5000 [USP'U]/ML
5000 INJECTION, SOLUTION INTRAVENOUS; SUBCUTANEOUS EVERY 8 HOURS
Status: DISCONTINUED | OUTPATIENT
Start: 2021-07-20 | End: 2021-07-23 | Stop reason: HOSPADM

## 2021-07-20 RX ADMIN — SODIUM CHLORIDE 1000 ML: 9 INJECTION, SOLUTION INTRAVENOUS at 12:18

## 2021-07-20 RX ADMIN — HEPARIN SODIUM 5000 UNITS: 5000 INJECTION INTRAVENOUS; SUBCUTANEOUS at 14:12

## 2021-07-20 RX ADMIN — ACETAMINOPHEN 650 MG: 325 TABLET ORAL at 17:54

## 2021-07-20 RX ADMIN — SODIUM CHLORIDE 1000 ML: 9 INJECTION, SOLUTION INTRAVENOUS at 11:42

## 2021-07-20 RX ADMIN — HEPARIN SODIUM 5000 UNITS: 5000 INJECTION INTRAVENOUS; SUBCUTANEOUS at 22:32

## 2021-07-20 RX ADMIN — SODIUM CHLORIDE 75 ML/HR: 9 INJECTION, SOLUTION INTRAVENOUS at 14:57

## 2021-07-20 RX ADMIN — INSULIN LISPRO 2 UNITS: 100 INJECTION, SOLUTION INTRAVENOUS; SUBCUTANEOUS at 22:31

## 2021-07-20 RX ADMIN — CEFTRIAXONE SODIUM 1 G: 1 INJECTION, POWDER, FOR SOLUTION INTRAMUSCULAR; INTRAVENOUS at 02:43

## 2021-07-20 RX ADMIN — IOPAMIDOL 80 ML: 755 INJECTION, SOLUTION INTRAVENOUS at 11:45

## 2021-07-20 RX ADMIN — INSULIN LISPRO 3 UNITS: 100 INJECTION, SOLUTION INTRAVENOUS; SUBCUTANEOUS at 16:40

## 2021-07-20 RX ADMIN — INSULIN GLARGINE 20 UNITS: 100 INJECTION, SOLUTION SUBCUTANEOUS at 22:30

## 2021-07-20 RX ADMIN — INSULIN LISPRO 2 UNITS: 100 INJECTION, SOLUTION INTRAVENOUS; SUBCUTANEOUS at 14:12

## 2021-07-20 NOTE — PROGRESS NOTES
Patient with elevated troponin, spoke with Dr. Whitney Miramontes, possible cath versus stress in the morning, n.p.o. after midnight

## 2021-07-20 NOTE — H&P
History & Physical    Primary Care Provider: Bindu Du MD  Source of Information: Patient's wife    History of Presenting Illness:   Mario Delgado is a 70 y.o. male who presents with altered mental status    History was primarily obtained from patient's wife, patient is confused. Patient's wife reports that patient was recently in [place], came to [facility], last night started feeling rigors and chills, he also had some dysuria with burning when he urinated. Patient also complained of some chest pain and shortness of breath. Patient was also having some knee pain, patient came to the ER, was diagnosed with a UTI and was discharged home on p.o. antibiotics. Per family when the patient reached home he continued to have some shortness of breath, was having dry heaving associated with trouble breathing, became confused and was having trouble walking. The family got concerned and decided to bring the patient to the hospital.  Patient was found to be confused while in the hospital and was requested to be admitted to the hospitalist service. Patient is arousable but is not able to answer many questions. No further history could be obtained from patient's family or patient himself. Per wife patient has history of CVA and has residual right lower extremity weakness          Review of Systems:  A comprehensive review of systems was negative except for that written in the History of Present Illness. All other systems reviewed, pertinent positives and negatives noted in HPI    Past Medical History:   Diagnosis Date    Diabetes ([address]     H/O echocardiogram 2014    50-55%  small apical defect    Hypercholesterolemia     Stroke [facility])       Past Surgical History:   Procedure Laterality Date    COLONOSCOPY N/A 11/11/2020    COLONOSCOPY performed by Vania Barnes MD at [address] [place] [address]     Prior to Admission medications    Medication Sig Start Date End Date Taking? Authorizing Provider   cephALEXin (Keflex) 500 mg capsule Take 1 Capsule by mouth two (2) times a day for 7 days. 7/20/21 7/27/21  Lord Melecio MD   aspirin delayed-release 325 mg tablet Take 1 Tab by mouth two (2) times a day. 2/13/16   Syed Augustin MD   insulin glargine (LANTUS) 100 unit/mL injection 25 Units by SubCUTAneous route daily (with breakfast). Provider, Historical   insulin glargine (LANTUS) 100 unit/mL injection 20 Units by SubCUTAneous route daily (with dinner). Provider, Historical   lisinopril (PRINIVIL, ZESTRIL) 40 mg tablet Take 40 mg by mouth daily. Provider, Historical   tiZANidine (ZANAFLEX) 4 mg tablet Take 4 mg by mouth nightly as needed. Provider, Historical   clopidogrel (PLAVIX) 75 mg tablet Take 1 Tab by mouth daily. 4/22/14   Cari Milligan MD   insulin aspart (NOVOLOG) 100 unit/mL injection 16 Units by SubCUTAneous route Before breakfast, lunch, and dinner. Indications: TYPE 2 DIABETES MELLITUS    Other, MD [place]     No Known Allergies   History reviewed. No pertinent family history. Family history was discussed with the patient, all pertinent and relevant details are mentioned as above, no other pertinent and relevant family history was noted on my discussion with the patient.   Patient specifically denies any history of Gaucher disease in the family  SOCIAL HISTORY:  Patient resides:  Independently x   Assisted Living    SNF    With family care       Smoking history:   None    Former x   Chronic      Alcohol history:   None    Social x   Chronic      Ambulates:   Independently x   w/cane    w/walker    w/wc    CODE STATUS:  DNR    Full x   Other      Objective:     Physical Exam:     Visit Vitals  /63 (BP 1 Location: Right upper arm, BP Patient Position: At rest)   Pulse 93   Temp 99 °F (37.2 °C)   Resp 20   SpO2 96%      O2 Device: None (Room air)    General : alert x 1, confused, no acute distress  HEENT: PEERL, moist mucus membrane, TM clear  Neck: supple,   Chest: Decreased basal breath sounds  CVS: Tachycardic  Abd: soft/ Non tender, non distended, BS physiological,   Ext: no clubbing, no cyanosis, no edema, brisk 2+ DP pulses  Neuro/Psych: Limited exam as patient not cooperative, moves all 4 spontaneously but strength could not be tested, opens eyes to verbal commands, follows some instructions, cranial nerves and sensory could not be tested, strength could not be tested  Skin: warm     EKG:  Sinus rhythm with nonspecific ST changes      Data Review:     Recent Days:  Recent Labs     07/20/21  0804 07/19/21  2225   WBC 12.2* 14.7*   HGB 14.7 15.7   HCT 43.1 46.1   * 153     Recent Labs     07/20/21  0804 07/19/21  2225    137   K 3.6 3.6   * 106   CO2 22 24   * 199*   BUN 26* 21*   CREA 1.79* 1.57*   CA 8.7 9.0   ALB  --  3.6   ALT  --  21     No results for input(s): PH, PCO2, PO2, HCO3, FIO2 in the last 72 hours. 24 Hour Results:  Recent Results (from the past 24 hour(s))   EKG, 12 LEAD, INITIAL    Collection Time: 07/19/21 10:07 PM   Result Value Ref Range    Ventricular Rate 80 BPM    Atrial Rate 101 BPM    QRS Duration 74 ms    Q-T Interval 438 ms    QTC Calculation (Bezet) 505 ms    Calculated R Axis 48 degrees    Calculated T Axis 59 degrees    Diagnosis       Sinus rhythm with 2nd degree AV block ([place]obitz I)  Septal infarct , age undetermined  Prolonged QT  When compared with ECG of 10-FEB-2016 14:11,  Junctional rhythm has replaced Sinus rhythm  Septal infarct is now present  Nonspecific T wave abnormality no longer evident in Inferior leads  QT has lengthened  Confirmed by Klaudia Tracy MD, Marley Harrison ([id number]) on 7/20/2021 7:57:46 AM     SAMPLES BEING HELD    Collection Time: 07/19/21 10:25 PM   Result Value Ref Range    SAMPLES BEING HELD 1blu 1red 1UA 1UC     COMMENT        Add-on orders for these samples will be processed based on acceptable specimen integrity and analyte stability, which may vary by analyte. CBC WITH AUTOMATED DIFF    Collection Time: 07/19/21 10:25 PM   Result Value Ref Range    WBC 14.7 (H) 4.1 - 11.1 K/uL    RBC 5.02 4.10 - 5.70 M/uL    HGB 15.7 12.1 - 17.0 g/dL    HCT 46.1 36.6 - 50.3 %    MCV 91.8 80.0 - 99.0 FL    MCH 31.3 26.0 - 34.0 PG    MCHC 34.1 30.0 - 36.5 g/dL    RDW 14.2 11.5 - 14.5 %    PLATELET 275 933 - 891 K/uL    MPV 11.7 8.9 - 12.9 FL    NRBC 0.0 0  WBC    ABSOLUTE NRBC 0.00 0.00 - 0.01 K/uL    NEUTROPHILS 87 (H) 32 - 75 %    LYMPHOCYTES 5 (L) 12 - 49 %    MONOCYTES 7 5 - 13 %    EOSINOPHILS 0 0 - 7 %    BASOPHILS 0 0 - 1 %    IMMATURE GRANULOCYTES 1 (H) 0.0 - 0.5 %    ABS. NEUTROPHILS 12.9 (H) 1.8 - 8.0 K/UL    ABS. LYMPHOCYTES 0.7 (L) 0.8 - 3.5 K/UL    ABS. MONOCYTES 1.0 0.0 - 1.0 K/UL    ABS. EOSINOPHILS 0.0 0.0 - 0.4 K/UL    ABS. BASOPHILS 0.0 0.0 - 0.1 K/UL    ABS. IMM. GRANS. 0.1 (H) 0.00 - 0.04 K/UL    DF SMEAR SCANNED      RBC COMMENTS ANISOCYTOSIS  1+       METABOLIC PANEL, COMPREHENSIVE    Collection Time: 07/19/21 10:25 PM   Result Value Ref Range    Sodium 137 136 - 145 mmol/L    Potassium 3.6 3.5 - 5.1 mmol/L    Chloride 106 97 - 108 mmol/L    CO2 24 21 - 32 mmol/L    Anion gap 7 5 - 15 mmol/L    Glucose 199 (H) 65 - 100 mg/dL    BUN 21 (H) 6 - 20 MG/DL    Creatinine 1.57 (H) 0.70 - 1.30 MG/DL    BUN/Creatinine ratio 13 12 - 20      GFR est AA 53 (L) >60 ml/min/1.73m2    GFR est non-AA 44 (L) >60 ml/min/1.73m2    Calcium 9.0 8.5 - 10.1 MG/DL    Bilirubin, total 1.0 0.2 - 1.0 MG/DL    ALT (SGPT) 21 12 - 78 U/L    AST (SGOT) 13 (L) 15 - 37 U/L    Alk.  phosphatase 115 45 - 117 U/L    Protein, total 7.6 6.4 - 8.2 g/dL    Albumin 3.6 3.5 - 5.0 g/dL    Globulin 4.0 2.0 - 4.0 g/dL    A-G Ratio 0.9 (L) 1.1 - 2.2     TROPONIN I    Collection Time: 07/19/21 10:25 PM   Result Value Ref Range    Troponin-I, Qt. <0.05 <0.05 ng/mL   URINALYSIS W/MICROSCOPIC    Collection Time: 07/20/21 12:45 AM   Result Value Ref Range    Color YELLOW/STRAW      Appearance CLOUDY (A) CLEAR Specific gravity 1.016 1.003 - 1.030      pH (UA) 5.5 5.0 - 8.0      Protein 30 (A) NEG mg/dL    Glucose Negative NEG mg/dL    Ketone Negative NEG mg/dL    Bilirubin Negative NEG      Blood SMALL (A) NEG      Urobilinogen 0.2 0.2 - 1.0 EU/dL    Nitrites Positive (A) NEG      Leukocyte Esterase SMALL (A) NEG      WBC 20-50 0 - 4 /hpf    RBC 0-5 0 - 5 /hpf    Epithelial cells FEW FEW /lpf    Bacteria 4+ (A) NEG /hpf    Hyaline cast 0-2 0 - 5 /lpf   POC LACTIC ACID    Collection Time: 07/20/21  1:05 AM   Result Value Ref Range    Lactic Acid (POC) 0.98 0.40 - 2.00 mmol/L   EKG, 12 LEAD, INITIAL    Collection Time: 07/20/21  3:06 AM   Result Value Ref Range    Ventricular Rate 99 BPM    Atrial Rate 68 BPM    QRS Duration 72 ms    Q-T Interval 394 ms    QTC Calculation (Bezet) 505 ms    Calculated R Axis 52 degrees    Calculated T Axis 59 degrees    Diagnosis       normal sinus rhythm with first degreee  Nonspecific ST and T wave abnormality  Prolonged QT  When compared with ECG of 19-JUL-2021 22:07,  Significant changes have occurred  Confirmed by Jagruti Love MD, Leslie Cardoso ([id number]) on 7/20/2021 7:58:50 AM     POC TROPONIN-I    Collection Time: 07/20/21  3:53 AM   Result Value Ref Range    Troponin-I (POC) <0.04 0.00 - 0.08 ng/mL   LACTIC ACID    Collection Time: 07/20/21  8:04 AM   Result Value Ref Range    Lactic acid 2.1 (HH) 0.4 - 2.0 MMOL/L   CBC WITH AUTOMATED DIFF    Collection Time: 07/20/21  8:04 AM   Result Value Ref Range    WBC 12.2 (H) 4.1 - 11.1 K/uL    RBC 4.69 4.10 - 5.70 M/uL    HGB 14.7 12.1 - 17.0 g/dL    HCT 43.1 36.6 - 50.3 %    MCV 91.9 80.0 - 99.0 FL    MCH 31.3 26.0 - 34.0 PG    MCHC 34.1 30.0 - 36.5 g/dL    RDW 14.6 (H) 11.5 - 14.5 %    PLATELET 597 (L) 251 - 400 K/uL    MPV 11.6 8.9 - 12.9 FL    NRBC 0.0 0  WBC    ABSOLUTE NRBC 0.00 0.00 - 0.01 K/uL    NEUTROPHILS 91 (H) 32 - 75 %    LYMPHOCYTES 4 (L) 12 - 49 %    MONOCYTES 4 (L) 5 - 13 %    EOSINOPHILS 0 0 - 7 %    BASOPHILS 0 0 - 1 % IMMATURE GRANULOCYTES 1 (H) 0.0 - 0.5 %    ABS. NEUTROPHILS 11.1 (H) 1.8 - 8.0 K/UL    ABS. LYMPHOCYTES 0.5 (L) 0.8 - 3.5 K/UL    ABS. MONOCYTES 0.5 0.0 - 1.0 K/UL    ABS. EOSINOPHILS 0.0 0.0 - 0.4 K/UL    ABS. BASOPHILS 0.0 0.0 - 0.1 K/UL    ABS. IMM. GRANS. 0.1 (H) 0.00 - 0.04 K/UL    DF SMEAR SCANNED      RBC COMMENTS NORMOCYTIC, NORMOCHROMIC     METABOLIC PANEL, BASIC    Collection Time: 07/20/21  8:04 AM   Result Value Ref Range    Sodium 139 136 - 145 mmol/L    Potassium 3.6 3.5 - 5.1 mmol/L    Chloride 109 (H) 97 - 108 mmol/L    CO2 22 21 - 32 mmol/L    Anion gap 8 5 - 15 mmol/L    Glucose 218 (H) 65 - 100 mg/dL    BUN 26 (H) 6 - 20 MG/DL    Creatinine 1.79 (H) 0.70 - 1.30 MG/DL    BUN/Creatinine ratio 15 12 - 20      GFR est AA 46 (L) >60 ml/min/1.73m2    GFR est non-AA 38 (L) >60 ml/min/1.73m2    Calcium 8.7 8.5 - 10.1 MG/DL   SAMPLES BEING HELD    Collection Time: 07/20/21  8:04 AM   Result Value Ref Range    SAMPLES BEING HELD 1BLU     COMMENT        Add-on orders for these samples will be processed based on acceptable specimen integrity and analyte stability, which may vary by analyte. PROCALCITONIN    Collection Time: 07/20/21  8:04 AM   Result Value Ref Range    Procalcitonin 36.29 ng/mL         Imaging:   XR CHEST PA LAT    Result Date: 7/20/2021  No acute cardiopulmonary process . CT ABD PELV WO CONT    Result Date: 7/20/2021  Sigmoid diverticulosis. No renal or ureteral stone or evidence of hydronephrosis. No acute abnormality. Assessment/Plan     UTI with SIRS  Metabolic encephalopathy  Chest pain  Diabetes mellitus type 2 poorly controlled  ELI on CKD    Admit patient on telemetry bed  Start patient on broad-spectrum IV antibiotics, urine culture, blood culture, IV hydration, supportive care close monitoring,?   Pyelonephritis, repeat lactate in 4 hours, continue to monitor  Metabolic encephalopathy likely secondary to #1, will get a CT of the head, neurovascular checks, TSH B12 levels, troponin, telemetry monitoring, if persist may consider further intervention and diagnostics, continue to monitor  Unclear etiology of chest pain, appears to be atypical, cycle troponins, patient already on aspirin and Plavix will continue the same, telemetry monitoring, echo, further intervention per hospital course, continue to monitor  Sliding scale NovoLog insulin, Accu-Cheks, diet control, optimize blood glucose control  ELI likely prerenal, no obvious signs of obstruction, IV hydration, trend creatinine, supportive care and close monitoring, avoid nephrotoxic medication, renally dose all medication, continue monitor      GI DVT prophylaxis: Patient will be on heparin             Please note that this dictation was completed with [employer], the computer voice recognition software. Quite often unanticipated grammatical, syntax, homophones, and other interpretive errors are inadvertently transcribed by the computer software. Please disregard these errors. Please excuse any errors that have escaped final proofreading.          Signed By: Jose Bethea MD     July 20, 2021

## 2021-07-20 NOTE — ED PROVIDER NOTES
HPI     70-year-old male with a history of diabetes, hypertension, stroke, presents the emergency department feeling shaky all day. He has had urinary burning. He had some right-sided chest pain with a little shortness of breath. Pain is not worse with deep breathing. He states his knees were aching. He was in [place] visiting family the night before so he drove home today and took a nap from 2-7. When he woke up he felt shaky again. He has had some intermittent constipation. He denies a fever. He has had chills. He has been vaccinated against Covid and did not have the virus during the pandemic. He did take Tylenol earlier this morning. He has had some bilateral back pain for the past few days without any injury or trauma. He has no history of kidney stones. He thinks he may have a urinary tract infection. He did check his sugar when he was shaky and it was 111. Denies any abdominal pain. Past Medical History:   Diagnosis Date    Diabetes ([facility] [address]     H/O echocardiogram 2014    50-55%  small apical defect    Hypercholesterolemia     Stroke [facility])        Past Surgical History:   Procedure Laterality Date    COLONOSCOPY N/A 11/11/2020    COLONOSCOPY performed by Jewel Briones MD at [facility]COPY         History reviewed. No pertinent family history.     Social History     Socioeconomic History    Marital status:      Spouse name: Not on file    Number of children: Not on file    Years of education: Not on file    Highest education level: Not on file   Occupational History    Not on file   Tobacco Use    Smoking status: Former Smoker     Packs/day: 0.25    Smokeless tobacco: Never Used   Substance and Sexual Activity    Alcohol use: No    Drug use: No    Sexual activity: Not on file   Other Topics Concern    Not on file   Social History Narrative    Not on file     Social Determinants of Health     Financial Resource Strain:     Difficulty of Paying Living Expenses:    Food Insecurity:     Worried About Running Out of Food in the Last Year:     [address] in the Last Year:    Transportation Needs:     Lack of Transportation (Medical):  Lack of Transportation (Non-Medical):    Physical Activity:     Days of Exercise per Week:     Minutes of Exercise per Session:    Stress:     Feeling of Stress :    Social Connections:     Frequency of Communication with Friends and Family:     Frequency of Social Gatherings with Friends and Family:     Attends [employer] Services:     Active Member of Clubs or Organizations:     Attends Club or Organization Meetings:     Marital Status:    Intimate Partner Violence:     Fear of Current or Ex-Partner:     Emotionally Abused:     Physically Abused:     Sexually Abused: ALLERGIES: Patient has no known allergies. Review of Systems   Constitutional: Positive for chills. Negative for fever. HENT: Negative for congestion. Eyes: Negative for visual disturbance. Respiratory: Positive for shortness of breath. Cardiovascular: Positive for chest pain. Negative for palpitations and leg swelling. Gastrointestinal: Negative for abdominal pain, nausea and vomiting. Endocrine: Negative for polyuria. Genitourinary: Positive for dysuria. Musculoskeletal: Positive for back pain. Negative for gait problem. Neurological: Negative for headaches. Psychiatric/Behavioral: Negative for dysphoric mood. Vitals:    07/19/21 2151   BP: 138/78   Pulse: (!) 108   Resp: 18   Temp: 98.3 °F (36.8 °C)   SpO2: 95%            Physical Exam  Constitutional:       General: He is not in acute distress. Appearance: He is well-developed. HENT:      Head: Normocephalic and atraumatic. Mouth/Throat:      Pharynx: No oropharyngeal exudate. Eyes:      General: No scleral icterus. Right eye: No discharge. Left eye: No discharge. Pupils: Pupils are equal, round, and reactive to light.    Neck: Vascular: No JVD. Cardiovascular:      Rate and Rhythm: Normal rate and regular rhythm. Heart sounds: Normal heart sounds. No murmur heard. Pulmonary:      Effort: Pulmonary effort is normal. No respiratory distress. Breath sounds: Normal breath sounds. No stridor. No wheezing or rales. Chest:      Chest wall: No tenderness. Abdominal:      General: Bowel sounds are normal. There is no distension. Palpations: Abdomen is soft. There is no mass. Tenderness: There is no abdominal tenderness. There is no guarding or rebound. Musculoskeletal:         General: Normal range of motion. Cervical back: Normal range of motion and neck supple. Skin:     General: Skin is warm and dry. Capillary Refill: Capillary refill takes less than 2 seconds. Findings: No rash. Neurological:      Mental Status: He is oriented to person, place, and time. Psychiatric:         Behavior: Behavior normal.         Thought Content: Thought content normal.         Judgment: Judgment normal.          MDM       Procedures        ED EKG interpretation:  Rhythm: ? w[place]kebock pattern; and regular . Rate (approx.): 80; Axis: normal; P wave: ; QRS interval:normal; ST/T wave: non-specific changes; This EKG was interpreted by Sami Miramontes MD,ED Provider. White blood cell count is 14.7 with a normal lactate. Urine is consistent with a UTI. Patient states he went to the bathroom and had another episode of chest pain which is really along the right lower rib upper abdomen. He states that it again has resolved. No associated shortness of breath nausea or sweating. He does not have any ischemic t EKG changes his serial troponins are negative. His symptoms are consistent with UTI with myalgia fever and chills. He received an IV dose of Rocephin in the ED and I will send him home with Keflex. Patient would like to go home and follow-up with his primary care doctor.   Return precautions provided.

## 2021-07-20 NOTE — DISCHARGE INSTRUCTIONS
Work up in the emergency department has revealed you have a urinary tract infection. We have given you a dose of IV antibiotics in the ED. I am sending you home on the antibiotics to complete a 7-day course. You can take Tylenol and/or Advil as needed for fever and body aches. Be sure to follow-up with your primary care doctor in the next 2 to 3 days if you are not feeling better. You feel your symptoms are getting worse despite the antibiotics such as persistent fevers, recurrent vomiting, severe abdominal or back pain, feeling lightheaded or dizzy, difficulty breathing, etc. return to the emergency room.

## 2021-07-20 NOTE — ED TRIAGE NOTES
Pt reports he woke up this morning with shakiness. Pt reports he has abdominal pain and it hurts when he pees.      Pt reports chest pain, SOB, knee pain  Pt denies fever

## 2021-07-20 NOTE — ED PROVIDER NOTES
Patient was evaluated a few hours ago, presents with worsening symptoms. Patient had been discharged with his wife and they were amenable with leaving however when they arrived home he developed shaking chills and was confused and unable to walk. He did not have focal weakness or numbness. Did not have a temperature taken. He therefore was brought back for reevaluation. No new complaints. No falls or other trauma. Past Medical History:   Diagnosis Date    Diabetes ([facility] [address]     H/O echocardiogram 2014    50-55%  small apical defect    Hypercholesterolemia     Stroke [facility])        Past Surgical History:   Procedure Laterality Date    COLONOSCOPY N/A 11/11/2020    COLONOSCOPY performed by Jewel Briones MD at [facility] ENDOSCOPY         History reviewed. No pertinent family history. Social History     Socioeconomic History    Marital status:      Spouse name: Not on file    Number of children: Not on file    Years of education: Not on file    Highest education level: Not on file   Occupational History    Not on file   Tobacco Use    Smoking status: Former Smoker     Packs/day: 0.25    Smokeless tobacco: Never Used   Substance and Sexual Activity    Alcohol use: No    Drug use: No    Sexual activity: Not on file   Other Topics Concern    Not on file   Social History Narrative    Not on file     Social Determinants of Health     Financial Resource Strain:     Difficulty of Paying Living Expenses:    Food Insecurity:     Worried About Running Out of Food in the Last Year:     [address] in the Last Year:    Transportation Needs:     Lack of Transportation (Medical):      Lack of Transportation (Non-Medical):    Physical Activity:     Days of Exercise per Week:     Minutes of Exercise per Session:    Stress:     Feeling of Stress :    Social Connections:     Frequency of Communication with Friends and Family:     Frequency of Social Gatherings with Friends and Family:     Attends [employer] Services:     Active Member of Clubs or Organizations:     Attends Club or Organization Meetings:     Marital Status:    Intimate Partner Violence:     Fear of Current or Ex-Partner:     Emotionally Abused:     Physically Abused:     Sexually Abused: ALLERGIES: Patient has no known allergies. Review of Systems   Constitutional: Positive for appetite change, chills and fatigue. Negative for fever. HENT: Negative for ear pain, sore throat and trouble swallowing. Eyes: Negative for visual disturbance. Respiratory: Negative for cough and shortness of breath. Cardiovascular: Negative for chest pain and leg swelling. Gastrointestinal: Positive for abdominal pain and nausea. Negative for vomiting. Genitourinary: Negative for dysuria and flank pain. Musculoskeletal: Negative for back pain. Skin: Negative for rash. Neurological: Positive for weakness and light-headedness. Negative for headaches. Psychiatric/Behavioral: Positive for confusion. All other systems reviewed and are negative. Vitals:    07/20/21 0721   BP: 125/69   Pulse: (!) 109   Resp: 20   Temp: 98.9 °F (37.2 °C)   SpO2: 96%            Physical Exam  Constitutional:       General: He is not in acute distress. Appearance: He is ill-appearing. He is not toxic-appearing. HENT:      Head: Normocephalic and atraumatic. Mouth/Throat:      Mouth: Mucous membranes are moist.   Eyes:      Extraocular Movements: Extraocular movements intact. Cardiovascular:      Rate and Rhythm: Regular rhythm. Tachycardia present. Heart sounds: Normal heart sounds. Pulmonary:      Effort: Pulmonary effort is normal. No respiratory distress. Breath sounds: Normal breath sounds. Abdominal:      Palpations: Abdomen is soft. Tenderness: There is no abdominal tenderness. Musculoskeletal:      Cervical back: Normal range of motion. Right lower leg: No edema. Left lower leg: No edema. Skin:     Capillary Refill: Capillary refill takes less than 2 seconds. Neurological:      General: No focal deficit present. Mental Status: He is alert and oriented to person, place, and time. Cranial Nerves: No cranial nerve deficit. Motor: Weakness ( generalized) present. Psychiatric:         Mood and Affect: Mood normal.          MDM  Number of Diagnoses or Management Options    MEDICAL DECISION MAKIN y.o. male presents with Other    Differential diagnosis includes but not limited to: Bacteremia, pyelonephritis, sepsis    LABORATORY TESTS:  Labs Reviewed   LACTIC ACID - Abnormal; Notable for the following components:       Result Value    Lactic acid 2.1 (*)     All other components within normal limits   CBC WITH AUTOMATED DIFF - Abnormal; Notable for the following components:    WBC 12.2 (*)     RDW 14.6 (*)     PLATELET 949 (*)     NEUTROPHILS 91 (*)     LYMPHOCYTES 4 (*)     MONOCYTES 4 (*)     IMMATURE GRANULOCYTES 1 (*)     ABS. NEUTROPHILS 11.1 (*)     ABS. LYMPHOCYTES 0.5 (*)     ABS. IMM. GRANS. 0.1 (*)     All other components within normal limits   METABOLIC PANEL, BASIC - Abnormal; Notable for the following components:    Chloride 109 (*)     Glucose 218 (*)     BUN 26 (*)     Creatinine 1.79 (*)     GFR est AA 46 (*)     GFR est non-AA 38 (*)     All other components within normal limits   SAMPLES BEING HELD   PROCALCITONIN       IMAGING RESULTS:  CT ABD PELV WO CONT   Final Result   Sigmoid diverticulosis. No renal or ureteral stone or evidence of   hydronephrosis. No acute abnormality.           MEDICATIONS GIVEN:  Medications   sodium chloride 0.9 % bolus infusion 1,000 mL (has no administration in time range)     Followed by   sodium chloride 0.9 % bolus infusion 1,000 mL (has no administration in time range)       PROGRESS NOTE:   10:50 AM Patient's symptoms have not improved    EKG:  Reviewed     CONSULTS:  10:50 AM Hospitalist Consult: ED Room Number: RH65/20, Patient Name and age: Kvng Garcia. 70 y.o.  male, Working Diagnosis:   1. Pyelonephritis    2. Suspected bacteremia   , COVID-19 Suspicion:  no, Sepsis present:  no  Reassessment needed: no, Code Status:  Full Code, Readmission: no, Isolation Requirements:  no, Recommended Level of Care:  telemetry, Department:[facility] Adult ED - 21 , Other: Patient with UTI discharged overnight, presented with confusion and generalized weakness, pro-Kevin in the 30s, lactic and creatinine increasing. Received ceftriaxone less than 12 hours ago. IMPRESSION:  1. Pyelonephritis    2.  Bacteremia        PLAN:  - Admit to hospitalist    Total critical care time spent exclusive of procedures:  77 minutes    Dakota Mccormick MD               Procedures

## 2021-07-20 NOTE — ED TRIAGE NOTES
Patient presents via EMS with complaints of \"breathing hard when I got home\". Per EMS his wife says he is confused, patient arrives alert and oriented times 4 and able to answer all orientation questions.  PAteint reports he still has the pain he was seen here for previously, patient was discharged at 0445 this morning

## 2021-07-20 NOTE — ED NOTES
TRANSFER - OUT REPORT:    Verbal report given to [address] [place] [address] Manju RN(name) on Klaudia Bryant.  being transferred to 2 (unit) for routine progression of care       Report consisted of patients Situation, Background, Assessment and   Recommendations(SBAR). Information from the following report(s) SBAR, Kardex, ED Summary, Intake/Output, MAR, Recent Results and Med Rec Status was reviewed with the receiving nurse. Lines:   Peripheral IV 07/20/21 Left Forearm (Active)   Site Assessment Clean, dry, & intact 07/20/21 0806   Phlebitis Assessment 0 07/20/21 0806   Infiltration Assessment 0 07/20/21 0806   Dressing Status Clean, dry, & intact 07/20/21 0806   Dressing Type Transparent 07/20/21 0806   Hub Color/Line Status Pink 07/20/21 0806        Opportunity for questions and clarification was provided.

## 2021-07-21 ENCOUNTER — APPOINTMENT (OUTPATIENT)
Dept: NON INVASIVE DIAGNOSTICS | Age: 71
DRG: 871 | End: 2021-07-21
Attending: INTERNAL MEDICINE
Payer: MEDICARE

## 2021-07-21 ENCOUNTER — APPOINTMENT (OUTPATIENT)
Dept: NUCLEAR MEDICINE | Age: 71
DRG: 871 | End: 2021-07-21
Attending: INTERNAL MEDICINE
Payer: MEDICARE

## 2021-07-21 LAB
ALBUMIN SERPL-MCNC: 2.5 G/DL (ref 3.5–5)
ALBUMIN/GLOB SERPL: 0.7 {RATIO} (ref 1.1–2.2)
ALP SERPL-CCNC: 65 U/L (ref 45–117)
ALT SERPL-CCNC: 20 U/L (ref 12–78)
ANION GAP SERPL CALC-SCNC: 6 MMOL/L (ref 5–15)
AST SERPL-CCNC: 29 U/L (ref 15–37)
BASOPHILS # BLD: 0 K/UL (ref 0–0.1)
BASOPHILS NFR BLD: 0 % (ref 0–1)
BILIRUB SERPL-MCNC: 0.6 MG/DL (ref 0.2–1)
BUN SERPL-MCNC: 31 MG/DL (ref 6–20)
BUN/CREAT SERPL: 19 (ref 12–20)
CALCIUM SERPL-MCNC: 8 MG/DL (ref 8.5–10.1)
CHLORIDE SERPL-SCNC: 111 MMOL/L (ref 97–108)
CK SERPL-CCNC: 796 U/L (ref 39–308)
CO2 SERPL-SCNC: 21 MMOL/L (ref 21–32)
CREAT SERPL-MCNC: 1.67 MG/DL (ref 0.7–1.3)
DIFFERENTIAL METHOD BLD: ABNORMAL
EOSINOPHIL # BLD: 0 K/UL (ref 0–0.4)
EOSINOPHIL NFR BLD: 0 % (ref 0–7)
ERYTHROCYTE [DISTWIDTH] IN BLOOD BY AUTOMATED COUNT: 14.5 % (ref 11.5–14.5)
GLOBULIN SER CALC-MCNC: 3.5 G/DL (ref 2–4)
GLUCOSE BLD STRIP.AUTO-MCNC: 114 MG/DL (ref 65–117)
GLUCOSE BLD STRIP.AUTO-MCNC: 168 MG/DL (ref 65–117)
GLUCOSE BLD STRIP.AUTO-MCNC: 171 MG/DL (ref 65–117)
GLUCOSE BLD STRIP.AUTO-MCNC: 98 MG/DL (ref 65–117)
GLUCOSE SERPL-MCNC: 183 MG/DL (ref 65–100)
HCT VFR BLD AUTO: 36.3 % (ref 36.6–50.3)
HGB BLD-MCNC: 12 G/DL (ref 12.1–17)
IMM GRANULOCYTES # BLD AUTO: 0 K/UL
IMM GRANULOCYTES NFR BLD AUTO: 0 %
LYMPHOCYTES # BLD: 0.6 K/UL (ref 0.8–3.5)
LYMPHOCYTES NFR BLD: 4 % (ref 12–49)
MCH RBC QN AUTO: 30.6 PG (ref 26–34)
MCHC RBC AUTO-ENTMCNC: 33.1 G/DL (ref 30–36.5)
MCV RBC AUTO: 92.6 FL (ref 80–99)
MONOCYTES # BLD: 0.3 K/UL (ref 0–1)
MONOCYTES NFR BLD: 2 % (ref 5–13)
NEUTS BAND NFR BLD MANUAL: 2 % (ref 0–6)
NEUTS SEG # BLD: 13.4 K/UL (ref 1.8–8)
NEUTS SEG NFR BLD: 92 % (ref 32–75)
NRBC # BLD: 0 K/UL (ref 0–0.01)
NRBC BLD-RTO: 0 PER 100 WBC
PLATELET # BLD AUTO: 88 K/UL (ref 150–400)
PMV BLD AUTO: 12.4 FL (ref 8.9–12.9)
POTASSIUM SERPL-SCNC: 3.7 MMOL/L (ref 3.5–5.1)
PROT SERPL-MCNC: 6 G/DL (ref 6.4–8.2)
RBC # BLD AUTO: 3.92 M/UL (ref 4.1–5.7)
RBC MORPH BLD: ABNORMAL
SERVICE CMNT-IMP: ABNORMAL
SERVICE CMNT-IMP: ABNORMAL
SERVICE CMNT-IMP: NORMAL
SERVICE CMNT-IMP: NORMAL
SODIUM SERPL-SCNC: 138 MMOL/L (ref 136–145)
WBC # BLD AUTO: 14.3 K/UL (ref 4.1–11.1)

## 2021-07-21 PROCEDURE — 74011000258 HC RX REV CODE- 258: Performed by: FAMILY MEDICINE

## 2021-07-21 PROCEDURE — 74011250636 HC RX REV CODE- 250/636: Performed by: INTERNAL MEDICINE

## 2021-07-21 PROCEDURE — 74011636637 HC RX REV CODE- 636/637: Performed by: FAMILY MEDICINE

## 2021-07-21 PROCEDURE — 78452 HT MUSCLE IMAGE SPECT MULT: CPT

## 2021-07-21 PROCEDURE — 85025 COMPLETE CBC W/AUTO DIFF WBC: CPT

## 2021-07-21 PROCEDURE — A9500 TC99M SESTAMIBI: HCPCS

## 2021-07-21 PROCEDURE — 89055 LEUKOCYTE ASSESSMENT FECAL: CPT

## 2021-07-21 PROCEDURE — 82550 ASSAY OF CK (CPK): CPT

## 2021-07-21 PROCEDURE — 82962 GLUCOSE BLOOD TEST: CPT

## 2021-07-21 PROCEDURE — 87506 IADNA-DNA/RNA PROBE TQ 6-11: CPT

## 2021-07-21 PROCEDURE — 87493 C DIFF AMPLIFIED PROBE: CPT

## 2021-07-21 PROCEDURE — 94760 N-INVAS EAR/PLS OXIMETRY 1: CPT

## 2021-07-21 PROCEDURE — 36415 COLL VENOUS BLD VENIPUNCTURE: CPT

## 2021-07-21 PROCEDURE — 87324 CLOSTRIDIUM AG IA: CPT

## 2021-07-21 PROCEDURE — 74011250636 HC RX REV CODE- 250/636: Performed by: FAMILY MEDICINE

## 2021-07-21 PROCEDURE — 77010033678 HC OXYGEN DAILY

## 2021-07-21 PROCEDURE — 80053 COMPREHEN METABOLIC PANEL: CPT

## 2021-07-21 PROCEDURE — 74011636637 HC RX REV CODE- 636/637: Performed by: INTERNAL MEDICINE

## 2021-07-21 PROCEDURE — 65270000029 HC RM PRIVATE

## 2021-07-21 RX ORDER — METHOTREXATE 25 MG/ML
25 INJECTION INTRA-ARTERIAL; INTRAMUSCULAR; INTRATHECAL; INTRAVENOUS
COMMUNITY
End: 2022-04-12

## 2021-07-21 RX ORDER — TETRAKIS(2-METHOXYISOBUTYLISOCYANIDE)COPPER(I) TETRAFLUOROBORATE 1 MG/ML
31.5 INJECTION, POWDER, LYOPHILIZED, FOR SOLUTION INTRAVENOUS
Status: COMPLETED | OUTPATIENT
Start: 2021-07-21 | End: 2021-07-21

## 2021-07-21 RX ORDER — ASPIRIN 81 MG/1
81 TABLET ORAL DAILY
Status: DISCONTINUED | OUTPATIENT
Start: 2021-07-21 | End: 2021-07-23 | Stop reason: HOSPADM

## 2021-07-21 RX ORDER — TETRAKIS(2-METHOXYISOBUTYLISOCYANIDE)COPPER(I) TETRAFLUOROBORATE 1 MG/ML
10.9 INJECTION, POWDER, LYOPHILIZED, FOR SOLUTION INTRAVENOUS
Status: COMPLETED | OUTPATIENT
Start: 2021-07-21 | End: 2021-07-21

## 2021-07-21 RX ORDER — INSULIN GLARGINE 100 [IU]/ML
20 INJECTION, SOLUTION SUBCUTANEOUS DAILY
Status: DISCONTINUED | OUTPATIENT
Start: 2021-07-21 | End: 2021-07-23 | Stop reason: HOSPADM

## 2021-07-21 RX ORDER — INSULIN GLARGINE 100 [IU]/ML
20 INJECTION, SOLUTION SUBCUTANEOUS
Status: DISCONTINUED | OUTPATIENT
Start: 2021-07-21 | End: 2021-07-23 | Stop reason: HOSPADM

## 2021-07-21 RX ORDER — SODIUM CHLORIDE 0.9 % (FLUSH) 0.9 %
20 SYRINGE (ML) INJECTION
Status: COMPLETED | OUTPATIENT
Start: 2021-07-21 | End: 2021-07-21

## 2021-07-21 RX ADMIN — HEPARIN SODIUM 5000 UNITS: 5000 INJECTION INTRAVENOUS; SUBCUTANEOUS at 08:20

## 2021-07-21 RX ADMIN — INSULIN LISPRO 2 UNITS: 100 INJECTION, SOLUTION INTRAVENOUS; SUBCUTANEOUS at 08:19

## 2021-07-21 RX ADMIN — REGADENOSON 0.4 MG: 0.08 INJECTION, SOLUTION INTRAVENOUS at 14:11

## 2021-07-21 RX ADMIN — TETRAKIS(2-METHOXYISOBUTYLISOCYANIDE)COPPER(I) TETRAFLUOROBORATE 31.5 MILLICURIE: 1 INJECTION, POWDER, LYOPHILIZED, FOR SOLUTION INTRAVENOUS at 14:10

## 2021-07-21 RX ADMIN — CEFTRIAXONE SODIUM 1 G: 1 INJECTION, POWDER, FOR SOLUTION INTRAMUSCULAR; INTRAVENOUS at 02:42

## 2021-07-21 RX ADMIN — Medication 20 ML: at 14:11

## 2021-07-21 RX ADMIN — TETRAKIS(2-METHOXYISOBUTYLISOCYANIDE)COPPER(I) TETRAFLUOROBORATE 10.9 MILLICURIE: 1 INJECTION, POWDER, LYOPHILIZED, FOR SOLUTION INTRAVENOUS at 13:00

## 2021-07-21 RX ADMIN — Medication 10 ML: at 23:28

## 2021-07-21 RX ADMIN — INSULIN GLARGINE 20 UNITS: 100 INJECTION, SOLUTION SUBCUTANEOUS at 23:28

## 2021-07-21 NOTE — PROGRESS NOTES
Hospitalist Progress Note  Suze Yuen MD  Answering service: [phone] from in house phone      Date of Service:  2021  NAME:  Latoya Mccabe. :  1950  MRN:  [id number]      Admission Summary:   Latoya Ocampo is a 70 y.o. male who presents with altered mental status     History was primarily obtained from patient's wife, patient is confused.     Patient's wife reports that patient was recently in [place], came to [facility], last night started feeling rigors and chills, he also had some dysuria with burning when he urinated. Patient also complained of some chest pain and shortness of breath. Patient was also having some knee pain, patient came to the ER, was diagnosed with a UTI and was discharged home on p.o. antibiotics. Per family when the patient reached home he continued to have some shortness of breath, was having dry heaving associated with trouble breathing, became confused and was having trouble walking. The family got concerned and decided to bring the patient to the hospital.  Patient was found to be confused while in the hospital and was requested to be admitted to the hospitalist service. Patient is arousable but is not able to answer many questions. No further history could be obtained from patient's family or patient himself. Per wife patient has history of CVA and has residual right lower extremity weakness    Interval history / Subjective:      Patient seen and examined today. Patient awake fully alert and oriented. He complains of frequent bowel movement which is loose every time he goes to the bathroom.     Assessment & Plan:     # Sepsis (POA) due to UTI  - sepsis based on tachycardia, elevated wbc, lactic acidosis with source of infection urinary tract   - follow blood and urine culture   - continue on ceftriaxone   - IVF     # Metabolic encephalopathy: resolved     # NSTEMI:   - stress test negative   - on Aspirin and Plavix   - cardiology following     # Diabetes mellitus type 2 poorly controlled  - On Lantus BID, Sliding scale NovoLog insulin, Accu-Cheks, diet control, optimize blood glucose control    # ELI on CKD  ELI likely prerenal, no obvious signs of obstruction, IV hydration, trend creatinine, supportive care and close monitoring, avoid nephrotoxic medication, renally dose all medication, continue monitor    # Diarrhea: check stool c diff, enteric bacteria, wbc    DVT ppx  Code: Full      Hospital Problems  Date Reviewed: 2/13/2016        Codes Class Noted POA    Metabolic encephalopathy POLA-99-UE: G93.41  ICD-9-CM: 348.31  7/20/2021 Unknown                Review of Systems:   Pertinent positive mentioned in interval history/HPI. Other systems reviewed and negative    Vital Signs:    Last 24hrs VS reviewed since prior progress note. Most recent are:  Visit Vitals  BP (!) 163/79 (BP 1 Location: Right upper arm, BP Patient Position: At rest)   Pulse 77   Temp 98.8 °F (37.1 °C)   Resp 18   Ht 5' 9.5\" (1.765 m)   Wt 96.1 kg (211 lb 12.8 oz)   SpO2 97%   BMI 30.83 kg/m²       No intake or output data in the 24 hours ending 07/21/21 1800     Physical Examination:             Constitutional:  No acute distress, cooperative, pleasant    ENT:  Oral mucous moist, oropharynx benign. Neck supple,    Resp:  CTA bilaterally. No wheezing/rhonchi/rales. No accessory muscle use   CV:  Regular rhythm, normal rate, no murmurs, gallops, rubs    GI:  Soft, non distended, non tender. normoactive bowel sounds, no hepatosplenomegaly     Musculoskeletal:  No edema, warm, 2+ pulses throughout    Neurologic:  Moves all extremities.   AAOx3, CN II-XII reviewed           Data Review:   I reviewed labs and imaging    Labs:     Recent Labs     07/21/21  0419 07/20/21  0804   WBC 14.3* 12.2*   HGB 12.0* 14.7   HCT 36.3* 43.1   PLT 88* 129*     Recent Labs     07/21/21  0419 07/20/21  1445 07/20/21  0804 07/19/21  2225   --  139 137   K 3.7  --  3.6 3.6   *  --  109* 106   CO2 21  --  22 24   BUN 31*  --  26* 21*   CREA 1.67*  --  1.79* 1.57*   *  --  218* 199*   CA 8.0*  --  8.7 9.0   MG  --  1.6  --   --      Recent Labs     07/21/21  0419 07/19/21  2225   ALT 20 21   AP 65 115   TBILI 0.6 1.0   TP 6.0* 7.6   ALB 2.5* 3.6   GLOB 3.5 4.0     No results for input(s): INR, PTP, APTT, INREXT in the last 72 hours. No results for input(s): FE, TIBC, PSAT, FERR in the last 72 hours. No results found for: FOL, RBCF   No results for input(s): PH, PCO2, PO2 in the last 72 hours.   Recent Labs     07/21/21  0419 07/20/21  1912 07/20/21  1445 07/20/21  1208   * 950* 814*  --    TROIQ  --  0.46* 0.52* 0.43*     Lab Results   Component Value Date/Time    Cholesterol, total 127 07/20/2021 02:45 PM    HDL Cholesterol 41 07/20/2021 02:45 PM    LDL, calculated 65.2 07/20/2021 02:45 PM    Triglyceride 104 07/20/2021 02:45 PM    CHOL/HDL Ratio 3.1 07/20/2021 02:45 PM     Lab Results   Component Value Date/Time    Glucose (POC) 114 07/21/2021 11:33 AM    Glucose (POC) 168 (H) 07/21/2021 06:09 AM    Glucose (POC) 220 (H) 07/20/2021 10:07 PM    Glucose (POC) 239 (H) 07/20/2021 04:27 PM    Glucose (POC) 181 (H) 07/20/2021 01:58 PM     Lab Results   Component Value Date/Time    Color YELLOW/STRAW 07/20/2021 12:45 AM    Appearance CLOUDY (A) 07/20/2021 12:45 AM    Specific gravity 1.016 07/20/2021 12:45 AM    pH (UA) 5.5 07/20/2021 12:45 AM    Protein 30 (A) 07/20/2021 12:45 AM    Glucose Negative 07/20/2021 12:45 AM    Ketone Negative 07/20/2021 12:45 AM    Bilirubin Negative 07/20/2021 12:45 AM    Urobilinogen 0.2 07/20/2021 12:45 AM    Nitrites Positive (A) 07/20/2021 12:45 AM    Leukocyte Esterase SMALL (A) 07/20/2021 12:45 AM    Epithelial cells FEW 07/20/2021 12:45 AM    Bacteria 4+ (A) 07/20/2021 12:45 AM    WBC 20-50 07/20/2021 12:45 AM    RBC 0-5 07/20/2021 12:45 AM         Medications Reviewed:     Current Facility-Administered Medications   Medication Dose Route Frequency    aspirin delayed-release tablet 81 mg  81 mg Oral DAILY    insulin glargine (LANTUS) injection 20 Units  20 Units SubCUTAneous DAILY    insulin glargine (LANTUS) injection 20 Units  20 Units SubCUTAneous QHS    clopidogreL (PLAVIX) tablet 75 mg  75 mg Oral DAILY    sodium chloride (NS) flush 5-40 mL  5-40 mL IntraVENous Q8H    sodium chloride (NS) flush 5-40 mL  5-40 mL IntraVENous PRN    0.9% sodium chloride infusion  75 mL/hr IntraVENous CONTINUOUS    acetaminophen (TYLENOL) tablet 650 mg  650 mg Oral Q4H PRN    [Held by provider] heparin (porcine) injection 5,000 Units  5,000 Units SubCUTAneous Q8H    ondansetron (ZOFRAN) injection 4 mg  4 mg IntraVENous Q4H PRN    cefTRIAXone (ROCEPHIN) 1 g in 0.9% sodium chloride (MBP/ADV) 50 mL MBP  1 g IntraVENous Q24H    glucose chewable tablet 16 g  4 Tablet Oral PRN    dextrose (D50W) injection syrg 12.5-25 g  25-50 mL IntraVENous PRN    glucagon (GLUCAGEN) injection 1 mg  1 mg IntraMUSCular PRN    insulin lispro (HUMALOG) injection   SubCUTAneous Q6H     ______________________________________________________________________  EXPECTED LENGTH OF STAY: 3d 12h  ACTUAL LENGTH OF STAY:          1                 Henny Gonzales MD   Patient has given Verbal permission to discuss medical care with   persons present in the room and and also with contact as listed on face sheet. Please note that this dictation was completed with PaperG, the computer voice recognition software. Quite often unanticipated grammatical, syntax, homophones, and other interpretive errors are inadvertently transcribed by the computer software. Please disregard these errors. Please excuse any errors that have escaped final proofreading. Thank you.

## 2021-07-21 NOTE — PROGRESS NOTES
TRANSITION OF CARE  RUR--12%  Disposition--Home with family assist.  Transport--Family    Patient's primary family contact: wife Misti Barnes    CM gave patient first Medicare IM Letter which informed patient of the right to appeal the discharge. Patient signed the original which was given to the patient and a copy was placed on the chart. Care Management Interventions  PCP Verified by CM: Yes  Transition of Care Consult (CM Consult): Other (TBD)  Physical Therapy Consult: No  Occupational Therapy Consult: No  Speech Therapy Consult: No  Current Support Network: Lives with Spouse  Confirm Follow Up Transport: Family  The Plan for Transition of Care is Related to the Following Treatment Goals : Home with family assist.  Discharge Location  Discharge Placement: Home with family assistance     Reason for Admission:  UTI with SIRS            Metabolic encephalopathy            Chest pain            DM            ELI on CKD             History of stroke 2014 with residual left sided weakness                   RUR Score:            12%           Plan for utilizing home health:     None     PCP: First and Last name: Sandra Velez MD   Name of Practice:    Are you a current patient: Yes    Approximate date of last visit: 6/17/21   Can you participate in a virtual visit with your PCP:                     Current Advanced Directive/Advance Care Plan: Full Code    Healthcare Decision Maker:   Click here to complete Whatever including selection of the Healthcare Decision Maker Relationship (ie \"Primary\")                           Transition of Care Plan:                    CM met with patient. Patient lives with his wife. Patient has 4 supportive children locally. Patient reports good family /social support. Patient is ambulatory and expects return to independent functioning. Patient confirmed PCP, health insurance, and prescription coverage. Previous home health : [address] ([address] following hip surgery).   Previous [facility]/ SNF rehab : [facility] (2014 following stroke)

## 2021-07-21 NOTE — PROGRESS NOTES
Bedside shift change report given to Cecilia Arriaga RN (oncoming nurse) by Eliz Watters RN (offgoing nurse). Report included the following information SBAR and Kardex.

## 2021-07-21 NOTE — PROGRESS NOTES
Physician Progress Note      PATIENT:               Ray Mills  CSN #:                  [id number]  :                       1950  ADMIT DATE:       2021 10:30 AM  DISCH DATE:  RESPONDING  PROVIDER #:        Ebony aMrtínez MD        QUERY TEXT:    Stage of Chronic Kidney Disease: Please provide further specificity, if known. Clinical indicators include: bun, creatinine, bun/creatinine, gfr, ckd, cr  Options provided:  -- Chronic kidney disease stage 1  -- Chronic kidney disease stage 2  -- Chronic kidney disease stage 3  -- Chronic kidney disease stage 3a  -- Chronic kidney disease stage 3b  -- Chronic kidney disease stage 4  -- Chronic kidney disease stage 5  -- Chronic kidney disease stage 5, requiring dialysis  -- End stage renal disease  -- Other - I will add my own diagnosis  -- Disagree - Not applicable / Not valid  -- Disagree - Clinically Unable to determine / Unknown        PROVIDER RESPONSE TEXT:    Provider was unable to determine a response for this query.       Electronically signed by:  Ebony Martínez MD 2021 2:53 PM

## 2021-07-21 NOTE — CONSULTS
Cardiology Consult Note    CC: CP/SOB  Reason for consult:  Elevated troponin  Requesting MD:  Dr. Barnes Labs     Subjective:      Date of  Admission: 2021 10:30 AM     Admission type:Emergency    Brandyn Samson is a 70 y.o. male admitted for Metabolic encephalopathy [F84.65]. Patient complaied initially of fever/chill/dysurina at home. Later yesterday he became more confused and lethargic. He could not walk as his legs were weak. He did complain Rt side aching type pain with SOB. He was rather poor historian then to know whether he was having more CP. His troponin has been up around 0.5 range. His ekg looked fine with no sishemic changes. There was a documentation of CAD in our chart in  but he denies that. His father  after CABG and younger brother has two stents. Our pt stopped smoking in  after CVA. Patient Active Problem List    Diagnosis Date Noted    Metabolic encephalopathy     H/O: stroke with residual effects 02/10/2016    Fracture of femoral neck, right ([facility] [address] 02/10/2016    CAD (coronary artery disease) 02/10/2016    CVA (cerebral infarction) 2014    DM (diabetes mellitus) ([facility] 2014    Other and unspecified hyperlipidemia 2014      Sheron Breaux MD  Past Medical History:   Diagnosis Date    Diabetes ([address]     H/O echocardiogram     50-55%  small apical defect    Hypercholesterolemia     Stroke [facility])       Past Surgical History:   Procedure Laterality Date    COLONOSCOPY N/A 2020    COLONOSCOPY performed by Mario Saldivar MD at [facility] ENDOSCOPY     No Known Allergies   History reviewed. No pertinent family history.    Current Facility-Administered Medications   Medication Dose Route Frequency    aspirin delayed-release tablet 325 mg  325 mg Oral BID    clopidogreL (PLAVIX) tablet 75 mg  75 mg Oral DAILY    insulin glargine (LANTUS) injection 20 Units  20 Units SubCUTAneous DAILY WITH DINNER    sodium chloride (NS) flush 5-40 mL 5-40 mL IntraVENous Q8H    sodium chloride (NS) flush 5-40 mL  5-40 mL IntraVENous PRN    0.9% sodium chloride infusion  75 mL/hr IntraVENous CONTINUOUS    acetaminophen (TYLENOL) tablet 650 mg  650 mg Oral Q4H PRN    heparin (porcine) injection 5,000 Units  5,000 Units SubCUTAneous Q8H    ondansetron (ZOFRAN) injection 4 mg  4 mg IntraVENous Q4H PRN    cefTRIAXone (ROCEPHIN) 1 g in 0.9% sodium chloride (MBP/ADV) 50 mL MBP  1 g IntraVENous Q24H    glucose chewable tablet 16 g  4 Tablet Oral PRN    dextrose (D50W) injection syrg 12.5-25 g  25-50 mL IntraVENous PRN    glucagon (GLUCAGEN) injection 1 mg  1 mg IntraMUSCular PRN    insulin lispro (HUMALOG) injection   SubCUTAneous Q6H        Prior to Admission Medications:  Prior to Admission medications    Medication Sig Start Date End Date Taking? Authorizing Provider   chlorthalidone (HYGROTON) 25 mg tablet Take 25 mg by mouth daily. Yes Provider, Historical   rosuvastatin (CRESTOR) 5 mg tablet Take 5 mg by mouth nightly. Yes Provider, Historical   glipiZIDE SR (GLUCOTROL XL) 10 mg CR tablet Take 10 mg by mouth daily. Yes Provider, Historical   celecoxib (CELEBREX) 200 mg capsule Take 200 mg by mouth two (2) times daily as needed for Pain. Yes Provider, Historical   pantoprazole (Protonix) 40 mg tablet Take 40 mg by mouth daily. Yes Provider, Historical   cholecalciferol (VITAMIN D3) (2,000 UNITS /50 MCG) cap capsule Take 2,000 Units by mouth daily. Yes Provider, Historical   aspirin delayed-release 325 mg tablet Take 1 Tab by mouth two (2) times a day. Patient taking differently: Take 81 mg by mouth daily. 2/13/16  Yes Lolita Rodarte MD   insulin glargine (LANTUS) 100 unit/mL injection 25 Units by SubCUTAneous route daily (with breakfast). Yes Provider, Historical   insulin glargine (LANTUS) 100 unit/mL injection 20 Units by SubCUTAneous route daily (with dinner).    Yes Provider, Historical   lisinopril (PRINIVIL, ZESTRIL) 40 mg tablet Take 40 mg by mouth daily. Yes Provider, Historical   insulin aspart (NOVOLOG) 100 unit/mL injection 16 Units by SubCUTAneous route Before breakfast, lunch, and dinner. Indications: TYPE 2 DIABETES MELLITUS   Yes Other, MD [place]   cephALEXin (Keflex) 500 mg capsule Take 1 Capsule by mouth two (2) times a day for 7 days. 21  Esperanza Gee MD        Review of Symptoms:  Except as noted in HPI, patient denies recent fever or chills, nausea, vomiting, diarrhea, hemoptysis, hematemesis, dysuria, myalgias, focal neurologic symptoms, ecchymosis, angioedema, odynophagia, dysphagia, sore throat, earache,rash, melena, hematochezia, depression, GERD, cold intolerance, petechia, bleeding gums, or significant weight loss. Review of systems not obtained due to patient factors. Subjective:    24 hr VS reviewed, overall VSSAF  Temp (24hrs), Av.2 °F (37.3 °C), Min:98.6 °F (37 °C), Max:100.3 °F (37.9 °C)    Patient Vitals for the past 8 hrs:   Pulse   21 0347 79   21 2345 92    Patient Vitals for the past 8 hrs:   Resp   21 0347 18   21 2345 18    Patient Vitals for the past 8 hrs:   BP   21 0347 103/60   21 2345 126/68          Intake/Output Summary (Last 24 hours) at 2021 0610  Last data filed at 2021 1757  Gross per 24 hour   Intake 100 ml   Output    Net 100 ml         Physical Exam (complete single organ system exam)      Visit Vitals  /60 (BP 1 Location: Right upper arm, BP Patient Position: At rest)   Pulse 79   Temp 99.5 °F (37.5 °C)   Resp 18   Wt 211 lb 8 oz (95.9 kg)   SpO2 97%   BMI 31.23 kg/m²     General Appearance:  Well developed, well nourished,alert and oriented x 3, and individual in no acute distress. Ears/Nose/Mouth/Throat:   Hearing grossly normal.         Neck: Supple. Chest:   Lungs clear to auscultation bilaterally. Cardiovascular:  Regular rate and rhythm, S1, S2 normal, no murmur.    Abdomen:   Soft, non-tender, bowel sounds are active. Extremities: No edema bilaterally. Skin: Warm and dry. Cardiographics    Telemetry: normal sinus rhythm  ECG: normal EKG, normal sinus rhythm, unchanged from previous tracings  Echocardiogram: Not done    Labs:   Recent Results (from the past 24 hour(s))   LACTIC ACID    Collection Time: 07/20/21  8:04 AM   Result Value Ref Range    Lactic acid 2.1 (HH) 0.4 - 2.0 MMOL/L   CBC WITH AUTOMATED DIFF    Collection Time: 07/20/21  8:04 AM   Result Value Ref Range    WBC 12.2 (H) 4.1 - 11.1 K/uL    RBC 4.69 4.10 - 5.70 M/uL    HGB 14.7 12.1 - 17.0 g/dL    HCT 43.1 36.6 - 50.3 %    MCV 91.9 80.0 - 99.0 FL    MCH 31.3 26.0 - 34.0 PG    MCHC 34.1 30.0 - 36.5 g/dL    RDW 14.6 (H) 11.5 - 14.5 %    PLATELET 954 (L) 707 - 400 K/uL    MPV 11.6 8.9 - 12.9 FL    NRBC 0.0 0  WBC    ABSOLUTE NRBC 0.00 0.00 - 0.01 K/uL    NEUTROPHILS 91 (H) 32 - 75 %    LYMPHOCYTES 4 (L) 12 - 49 %    MONOCYTES 4 (L) 5 - 13 %    EOSINOPHILS 0 0 - 7 %    BASOPHILS 0 0 - 1 %    IMMATURE GRANULOCYTES 1 (H) 0.0 - 0.5 %    ABS. NEUTROPHILS 11.1 (H) 1.8 - 8.0 K/UL    ABS. LYMPHOCYTES 0.5 (L) 0.8 - 3.5 K/UL    ABS. MONOCYTES 0.5 0.0 - 1.0 K/UL    ABS. EOSINOPHILS 0.0 0.0 - 0.4 K/UL    ABS. BASOPHILS 0.0 0.0 - 0.1 K/UL    ABS. IMM.  GRANS. 0.1 (H) 0.00 - 0.04 K/UL    DF SMEAR SCANNED      RBC COMMENTS NORMOCYTIC, NORMOCHROMIC     METABOLIC PANEL, BASIC    Collection Time: 07/20/21  8:04 AM   Result Value Ref Range    Sodium 139 136 - 145 mmol/L    Potassium 3.6 3.5 - 5.1 mmol/L    Chloride 109 (H) 97 - 108 mmol/L    CO2 22 21 - 32 mmol/L    Anion gap 8 5 - 15 mmol/L    Glucose 218 (H) 65 - 100 mg/dL    BUN 26 (H) 6 - 20 MG/DL    Creatinine 1.79 (H) 0.70 - 1.30 MG/DL    BUN/Creatinine ratio 15 12 - 20      GFR est AA 46 (L) >60 ml/min/1.73m2    GFR est non-AA 38 (L) >60 ml/min/1.73m2    Calcium 8.7 8.5 - 10.1 MG/DL   SAMPLES BEING HELD    Collection Time: 07/20/21  8:04 AM   Result Value Ref Range    SAMPLES BEING HELD 1BLU COMMENT        Add-on orders for these samples will be processed based on acceptable specimen integrity and analyte stability, which may vary by analyte.    PROCALCITONIN    Collection Time: 07/20/21  8:04 AM   Result Value Ref Range    Procalcitonin 36.29 ng/mL   TROPONIN I    Collection Time: 07/20/21 12:08 PM   Result Value Ref Range    Troponin-I, Qt. 0.43 (H) <0.05 ng/mL   LACTIC ACID    Collection Time: 07/20/21 12:08 PM   Result Value Ref Range    Lactic acid 2.0 0.4 - 2.0 MMOL/L   HEMOGLOBIN A1C WITH EAG    Collection Time: 07/20/21 12:08 PM   Result Value Ref Range    Hemoglobin A1c 7.1 (H) 4.0 - 5.6 %    Est. average glucose 157 mg/dL   GLUCOSE, POC    Collection Time: 07/20/21 12:17 PM   Result Value Ref Range    Glucose (POC) 193 (H) 65 - 117 mg/dL    Performed by [address] [place] [address], POC    Collection Time: 07/20/21  1:58 PM   Result Value Ref Range    Glucose (POC) 181 (H) 65 - 117 mg/dL    Performed by [facility]    LACTIC ACID    Collection Time: 07/20/21  2:45 PM   Result Value Ref Range    Lactic acid 2.8 (HH) 0.4 - 2.0 MMOL/L   LIPID PANEL    Collection Time: 07/20/21  2:45 PM   Result Value Ref Range    Cholesterol, total 127 <200 MG/DL    Triglyceride 104 <150 MG/DL    HDL Cholesterol 41 MG/DL    LDL, calculated 65.2 0 - 100 MG/DL    VLDL, calculated 20.8 MG/DL    CHOL/HDL Ratio 3.1 0.0 - 5.0     TSH 3RD GENERATION    Collection Time: 07/20/21  2:45 PM   Result Value Ref Range    TSH 0.59 0.36 - 3.74 uIU/mL   MAGNESIUM    Collection Time: 07/20/21  2:45 PM   Result Value Ref Range    Magnesium 1.6 1.6 - 2.4 mg/dL   VITAMIN B12    Collection Time: 07/20/21  2:45 PM   Result Value Ref Range    Vitamin B12 277 193 - 986 pg/mL   TROPONIN I    Collection Time: 07/20/21  2:45 PM   Result Value Ref Range    Troponin-I, Qt. 0.52 (H) <0.05 ng/mL   CK    Collection Time: 07/20/21  2:45 PM   Result Value Ref Range     (H) 39 - 308 U/L   CULTURE, BLOOD, PAIRED    Collection Time: 07/20/21  2:45 PM    Specimen: Blood   Result Value Ref Range    Special Requests: NO SPECIAL REQUESTS      Culture result: NO GROWTH AFTER 13 HOURS     SAMPLES BEING HELD    Collection Time: 07/20/21  2:45 PM   Result Value Ref Range    SAMPLES BEING HELD 1PST     COMMENT        Add-on orders for these samples will be processed based on acceptable specimen integrity and analyte stability, which may vary by analyte. GLUCOSE, POC    Collection Time: 07/20/21  4:27 PM   Result Value Ref Range    Glucose (POC) 239 (H) 65 - 117 mg/dL    Performed by [address] [place] [address] One ACID    Collection Time: 07/20/21  7:12 PM   Result Value Ref Range    Lactic acid 1.8 0.4 - 2.0 MMOL/L   CK    Collection Time: 07/20/21  7:12 PM   Result Value Ref Range     (H) 39 - 308 U/L   TROPONIN I    Collection Time: 07/20/21  7:12 PM   Result Value Ref Range    Troponin-I, Qt. 0.46 (H) <0.05 ng/mL   GLUCOSE, POC    Collection Time: 07/20/21 10:07 PM   Result Value Ref Range    Glucose (POC) 220 (H) 65 - 117 mg/dL    Performed by Stephen Dodson    METABOLIC PANEL, COMPREHENSIVE    Collection Time: 07/21/21  4:19 AM   Result Value Ref Range    Sodium 138 136 - 145 mmol/L    Potassium 3.7 3.5 - 5.1 mmol/L    Chloride 111 (H) 97 - 108 mmol/L    CO2 21 21 - 32 mmol/L    Anion gap 6 5 - 15 mmol/L    Glucose 183 (H) 65 - 100 mg/dL    BUN 31 (H) 6 - 20 MG/DL    Creatinine 1.67 (H) 0.70 - 1.30 MG/DL    BUN/Creatinine ratio 19 12 - 20      GFR est AA 49 (L) >60 ml/min/1.73m2    GFR est non-AA 41 (L) >60 ml/min/1.73m2    Calcium 8.0 (L) 8.5 - 10.1 MG/DL    Bilirubin, total 0.6 0.2 - 1.0 MG/DL    ALT (SGPT) 20 12 - 78 U/L    AST (SGOT) 29 15 - 37 U/L    Alk.  phosphatase 65 45 - 117 U/L    Protein, total 6.0 (L) 6.4 - 8.2 g/dL    Albumin 2.5 (L) 3.5 - 5.0 g/dL    Globulin 3.5 2.0 - 4.0 g/dL    A-G Ratio 0.7 (L) 1.1 - 2.2     CBC WITH AUTOMATED DIFF    Collection Time: 07/21/21  4:19 AM   Result Value Ref Range    WBC 14.3 (H) 4.1 - 11.1 K/uL RBC 3.92 (L) 4.10 - 5.70 M/uL    HGB 12.0 (L) 12.1 - 17.0 g/dL    HCT 36.3 (L) 36.6 - 50.3 %    MCV 92.6 80.0 - 99.0 FL    MCH 30.6 26.0 - 34.0 PG    MCHC 33.1 30.0 - 36.5 g/dL    RDW 14.5 11.5 - 14.5 %    PLATELET 88 (L) 602 - 400 K/uL    MPV 12.4 8.9 - 12.9 FL    NRBC 0.0 0  WBC    ABSOLUTE NRBC 0.00 0.00 - 0.01 K/uL    NEUTROPHILS 92 (H) 32 - 75 %    BAND NEUTROPHILS 2 0 - 6 %    LYMPHOCYTES 4 (L) 12 - 49 %    MONOCYTES 2 (L) 5 - 13 %    EOSINOPHILS 0 0 - 7 %    BASOPHILS 0 0 - 1 %    IMMATURE GRANULOCYTES 0 %    ABS. NEUTROPHILS 13.4 (H) 1.8 - 8.0 K/UL    ABS. LYMPHOCYTES 0.6 (L) 0.8 - 3.5 K/UL    ABS. MONOCYTES 0.3 0.0 - 1.0 K/UL    ABS. EOSINOPHILS 0.0 0.0 - 0.4 K/UL    ABS. BASOPHILS 0.0 0.0 - 0.1 K/UL    ABS. IMM.  GRANS. 0.0 K/UL    DF MANUAL      RBC COMMENTS ANISOCYTOSIS  1+       CK    Collection Time: 07/21/21  4:19 AM   Result Value Ref Range     (H) 39 - 308 U/L        Assessment:     Assessment:   CP; could be ischemia  Elevated troponin; I feel as though it was demand ischemia   UTI; his presentation  Confusion; metabolic encephalopathy but early sepsis syndrome is possible   CKD      Plan:   Tele  Will order a stress test to document ischemia  Continue current regimen    Leena Rodriguez MD

## 2021-07-21 NOTE — PROGRESS NOTES
Bedside shift change report given to Libby Longoria (oncoming nurse) by Mireille Poole (offgoing nurse). Report included the following information SBAR, Kardex, MAR and Recent Results.

## 2021-07-22 ENCOUNTER — APPOINTMENT (OUTPATIENT)
Dept: NON INVASIVE DIAGNOSTICS | Age: 71
DRG: 871 | End: 2021-07-22
Attending: FAMILY MEDICINE
Payer: MEDICARE

## 2021-07-22 LAB
ANION GAP SERPL CALC-SCNC: 7 MMOL/L (ref 5–15)
BACTERIA SPEC CULT: ABNORMAL
BUN SERPL-MCNC: 28 MG/DL (ref 6–20)
BUN/CREAT SERPL: 20 (ref 12–20)
C DIFF TOX GENS STL QL NAA+PROBE: POSITIVE
CALCIUM SERPL-MCNC: 8 MG/DL (ref 8.5–10.1)
CAMPYLOBACTER SPECIES, DNA: NEGATIVE
CC UR VC: ABNORMAL
CHLORIDE SERPL-SCNC: 111 MMOL/L (ref 97–108)
CO2 SERPL-SCNC: 20 MMOL/L (ref 21–32)
CREAT SERPL-MCNC: 1.38 MG/DL (ref 0.7–1.3)
ECHO AO ROOT DIAM: 3.78 CM
ECHO AV AREA PEAK VELOCITY: 2.48 CM2
ECHO AV AREA/BSA PEAK VELOCITY: 1.2 CM2/M2
ECHO AV PEAK GRADIENT: 5.19 MMHG
ECHO AV PEAK VELOCITY: 113.94 CM/S
ECHO LA AREA 4C: 24.67 CM2
ECHO LA MAJOR AXIS: 5.36 CM
ECHO LA MINOR AXIS: 2.52 CM
ECHO LA VOL 2C: 83.42 ML (ref 18–58)
ECHO LA VOL 4C: 72.04 ML (ref 18–58)
ECHO LA VOL BP: 84.47 ML (ref 18–58)
ECHO LA VOL/BSA BIPLANE: 39.66 ML/M2 (ref 16–28)
ECHO LA VOLUME INDEX A2C: 39.16 ML/M2 (ref 16–28)
ECHO LA VOLUME INDEX A4C: 33.82 ML/M2 (ref 16–28)
ECHO LV E' LATERAL VELOCITY: 10.67 CM/S
ECHO LV E' SEPTAL VELOCITY: 8.38 CM/S
ECHO LV EDV A2C: 111.71 ML
ECHO LV EDV A4C: 101.14 ML
ECHO LV EDV BP: 107.26 ML (ref 67–155)
ECHO LV EDV INDEX A4C: 47.5 ML/M2
ECHO LV EDV INDEX BP: 50.4 ML/M2
ECHO LV EDV NDEX A2C: 52.4 ML/M2
ECHO LV EJECTION FRACTION A2C: 68 PERCENT
ECHO LV EJECTION FRACTION A4C: 63 PERCENT
ECHO LV EJECTION FRACTION BIPLANE: 65.1 PERCENT (ref 55–100)
ECHO LV ESV A2C: 35.97 ML
ECHO LV ESV A4C: 37.09 ML
ECHO LV ESV BP: 37.43 ML (ref 22–58)
ECHO LV ESV INDEX A2C: 16.9 ML/M2
ECHO LV ESV INDEX A4C: 17.4 ML/M2
ECHO LV ESV INDEX BP: 17.6 ML/M2
ECHO LV INTERNAL DIMENSION DIASTOLIC: 4.75 CM (ref 4.2–5.9)
ECHO LV INTERNAL DIMENSION SYSTOLIC: 2.63 CM
ECHO LV IVSD: 0.93 CM (ref 0.6–1)
ECHO LV MASS 2D: 148.5 G (ref 88–224)
ECHO LV MASS INDEX 2D: 69.7 G/M2 (ref 49–115)
ECHO LV POSTERIOR WALL DIASTOLIC: 0.9 CM (ref 0.6–1)
ECHO LVOT DIAM: 2.01 CM
ECHO LVOT PEAK GRADIENT: 3.17 MMHG
ECHO LVOT PEAK VELOCITY: 89.01 CM/S
ECHO MV E VELOCITY: 93.55 CM/S
ECHO MV E/E' LATERAL: 8.77
ECHO MV E/E' RATIO (AVERAGED): 9.97
ECHO MV E/E' SEPTAL: 11.16
ECHO PV PEAK INSTANTANEOUS GRADIENT SYSTOLIC: 1.74 MMHG
ECHO RV TAPSE: 1.82 CM (ref 1.5–2)
ECHO TV REGURGITANT MAX VELOCITY: 199.75 CM/S
ECHO TV REGURGITANT PEAK GRADIENT: 15.96 MMHG
ENTEROTOXIGEN E COLI, DNA: NEGATIVE
ERYTHROCYTE [DISTWIDTH] IN BLOOD BY AUTOMATED COUNT: 14.4 % (ref 11.5–14.5)
GLUCOSE BLD STRIP.AUTO-MCNC: 144 MG/DL (ref 65–117)
GLUCOSE BLD STRIP.AUTO-MCNC: 155 MG/DL (ref 65–117)
GLUCOSE BLD STRIP.AUTO-MCNC: 177 MG/DL (ref 65–117)
GLUCOSE BLD STRIP.AUTO-MCNC: 181 MG/DL (ref 65–117)
GLUCOSE BLD STRIP.AUTO-MCNC: 218 MG/DL (ref 65–117)
GLUCOSE SERPL-MCNC: 168 MG/DL (ref 65–100)
HCT VFR BLD AUTO: 37 % (ref 36.6–50.3)
HGB BLD-MCNC: 12 G/DL (ref 12.1–17)
INTERPRETATION: ABNORMAL
MCH RBC QN AUTO: 30.4 PG (ref 26–34)
MCHC RBC AUTO-ENTMCNC: 32.4 G/DL (ref 30–36.5)
MCV RBC AUTO: 93.7 FL (ref 80–99)
NRBC # BLD: 0 K/UL (ref 0–0.01)
NRBC BLD-RTO: 0 PER 100 WBC
P SHIGELLOIDES DNA STL QL NAA+PROBE: NEGATIVE
PCR REFLEX: ABNORMAL
PLATELET # BLD AUTO: 88 K/UL (ref 150–400)
PMV BLD AUTO: 12.6 FL (ref 8.9–12.9)
POTASSIUM SERPL-SCNC: 4 MMOL/L (ref 3.5–5.1)
RBC # BLD AUTO: 3.95 M/UL (ref 4.1–5.7)
SALMONELLA SPECIES, DNA: NEGATIVE
SERVICE CMNT-IMP: ABNORMAL
SHIGA TOXIN PRODUCING, DNA: NEGATIVE
SHIGELLA SP+EIEC IPAH STL QL NAA+PROBE: NEGATIVE
SODIUM SERPL-SCNC: 138 MMOL/L (ref 136–145)
STRESS BASELINE DIAS BP: 73 MMHG
STRESS BASELINE HR: 78 BPM
STRESS BASELINE SYS BP: 157 MMHG
STRESS ESTIMATED WORKLOAD: 1 METS
STRESS EXERCISE DUR MIN: NORMAL
STRESS PEAK DIAS BP: 79 MMHG
STRESS PEAK SYS BP: 159 MMHG
STRESS PERCENT HR ACHIEVED: 67 %
STRESS POST PEAK HR: 100 BPM
STRESS RATE PRESSURE PRODUCT: NORMAL BPM*MMHG
STRESS STAGE 1 BP: NORMAL MMHG
STRESS STAGE 1 DURATION: 1 MIN:SEC
STRESS STAGE 1 HR: 92 BPM
STRESS STAGE 2 DURATION: 1 MIN:SEC
STRESS STAGE 2 HR: 98 BPM
STRESS STAGE 3 BP: NORMAL MMHG
STRESS STAGE 3 DURATION: 1 MIN:SEC
STRESS STAGE 3 HR: 98 BPM
STRESS STAGE RECOVERY 1 DURATION: 1 MIN:SEC
STRESS STAGE RECOVERY 1 HR: 92 BPM
STRESS STAGE RECOVERY 2 BP: NORMAL MMHG
STRESS STAGE RECOVERY 2 DURATION: 1 MIN:SEC
STRESS STAGE RECOVERY 2 HR: 91 BPM
STRESS TARGET HR: 149 BPM
VIBRIO SPECIES, DNA: NEGATIVE
WBC # BLD AUTO: 10.8 K/UL (ref 4.1–11.1)
WBC #/AREA STL HPF: NORMAL /HPF (ref 0–4)
Y. ENTEROCOLITICA, DNA: NEGATIVE

## 2021-07-22 PROCEDURE — 74011636637 HC RX REV CODE- 636/637: Performed by: FAMILY MEDICINE

## 2021-07-22 PROCEDURE — 82962 GLUCOSE BLOOD TEST: CPT

## 2021-07-22 PROCEDURE — 74011000258 HC RX REV CODE- 258: Performed by: FAMILY MEDICINE

## 2021-07-22 PROCEDURE — 85027 COMPLETE CBC AUTOMATED: CPT

## 2021-07-22 PROCEDURE — 36415 COLL VENOUS BLD VENIPUNCTURE: CPT

## 2021-07-22 PROCEDURE — 74011250637 HC RX REV CODE- 250/637: Performed by: FAMILY MEDICINE

## 2021-07-22 PROCEDURE — 80048 BASIC METABOLIC PNL TOTAL CA: CPT

## 2021-07-22 PROCEDURE — 65270000029 HC RM PRIVATE

## 2021-07-22 PROCEDURE — 74011250636 HC RX REV CODE- 250/636: Performed by: FAMILY MEDICINE

## 2021-07-22 PROCEDURE — 74011636637 HC RX REV CODE- 636/637: Performed by: INTERNAL MEDICINE

## 2021-07-22 PROCEDURE — 74011250637 HC RX REV CODE- 250/637: Performed by: INTERNAL MEDICINE

## 2021-07-22 PROCEDURE — 93306 TTE W/DOPPLER COMPLETE: CPT

## 2021-07-22 RX ORDER — TAMSULOSIN HYDROCHLORIDE 0.4 MG/1
0.4 CAPSULE ORAL DAILY
Status: DISCONTINUED | OUTPATIENT
Start: 2021-07-23 | End: 2021-07-23 | Stop reason: HOSPADM

## 2021-07-22 RX ADMIN — INSULIN GLARGINE 20 UNITS: 100 INJECTION, SOLUTION SUBCUTANEOUS at 09:40

## 2021-07-22 RX ADMIN — CLOPIDOGREL BISULFATE 75 MG: 75 TABLET ORAL at 09:39

## 2021-07-22 RX ADMIN — CEFTRIAXONE SODIUM 1 G: 1 INJECTION, POWDER, FOR SOLUTION INTRAMUSCULAR; INTRAVENOUS at 02:42

## 2021-07-22 RX ADMIN — INSULIN LISPRO 3 UNITS: 100 INJECTION, SOLUTION INTRAVENOUS; SUBCUTANEOUS at 12:13

## 2021-07-22 RX ADMIN — SODIUM CHLORIDE 75 ML/HR: 9 INJECTION, SOLUTION INTRAVENOUS at 16:01

## 2021-07-22 RX ADMIN — Medication 10 ML: at 21:08

## 2021-07-22 RX ADMIN — ASPIRIN 81 MG: 81 TABLET, COATED ORAL at 09:39

## 2021-07-22 RX ADMIN — INSULIN LISPRO 2 UNITS: 100 INJECTION, SOLUTION INTRAVENOUS; SUBCUTANEOUS at 18:11

## 2021-07-22 RX ADMIN — INSULIN LISPRO 2 UNITS: 100 INJECTION, SOLUTION INTRAVENOUS; SUBCUTANEOUS at 07:57

## 2021-07-22 RX ADMIN — INSULIN GLARGINE 20 UNITS: 100 INJECTION, SOLUTION SUBCUTANEOUS at 22:18

## 2021-07-22 NOTE — PROGRESS NOTES
Bedside shift change report given to Marita Zamudio (oncoming nurse) by Simona Sullivan RN (offgoing nurse). Report included the following information SBAR and Kardex.

## 2021-07-22 NOTE — PROGRESS NOTES
Problem: Diabetes Self-Management  Goal: *Disease process and treatment process  Description: Define diabetes and identify own type of diabetes; list 3 options for treating diabetes. Outcome: Progressing Towards Goal     Problem: Risk for Spread of Infection  Goal: Prevent transmission of infectious organism to others  Description: Prevent the transmission of infectious organisms to other patients, staff members, and visitors.   Outcome: Progressing Towards Goal

## 2021-07-22 NOTE — PROGRESS NOTES
Bedside shift change report given to Shakir Mcallister (oncoming nurse) by Beto Burrell (offgoing nurse). Report included the following information SBAR, Kardex, MAR and Recent Results.

## 2021-07-22 NOTE — PROGRESS NOTES
Cardiology Progress Note                                        Admit Date: 7/20/2021    Assessment/Plan:     CP; not cardiac; his stress test was negative for ischemia and EF preserved at 59%; no further evaluation needed at this time    Carmita Recinos is a 70 y.o. male with     PROBLEM LIST:  Patient Active Problem List    Diagnosis Date Noted    Metabolic encephalopathy 30/60/0247    H/O: stroke with residual effects 02/10/2016    Fracture of femoral neck, right ([facility] 02/10/2016    CAD (coronary artery disease) 02/10/2016    CVA (cerebral infarction) 04/19/2014    [facility] 04/19/2014    Other and unspecified hyperlipidemia 04/19/2014         Subjective: Carmita Recinos reports none. Visit Vitals  BP (!) 155/74 (BP 1 Location: Right upper arm, BP Patient Position: At rest)   Pulse 76   Temp 98.7 °F (37.1 °C)   Resp 18   Ht 5' 10\" (1.778 m)   Wt 210 lb (95.3 kg)   SpO2 98%   BMI 30.13 kg/m²       Intake/Output Summary (Last 24 hours) at 7/22/2021 1349  Last data filed at 7/22/2021 0902  Gross per 24 hour   Intake 240 ml   Output    Net 240 ml       Objective:      Physical Exam:  HEENT: Perrla, EOMI  Neck: No JVD,  No thyroidmegaly  Resp: CTA bilaterally;  No wheezes or rales  CV: RRR s1s2 No murmur no s3  Abd:Soft, Nontender  Ext: No edema  Neuro: Alert and oriented; Nonfocal  Skin: Warm, Dry, Intact  Pulses: 2+ DP/PT/Rad      Telemetry: normal sinus rhythm    Current Facility-Administered Medications   Medication Dose Route Frequency    aspirin delayed-release tablet 81 mg  81 mg Oral DAILY    insulin glargine (LANTUS) injection 20 Units  20 Units SubCUTAneous DAILY    insulin glargine (LANTUS) injection 20 Units  20 Units SubCUTAneous QHS    clopidogreL (PLAVIX) tablet 75 mg  75 mg Oral DAILY    sodium chloride (NS) flush 5-40 mL  5-40 mL IntraVENous Q8H    sodium chloride (NS) flush 5-40 mL  5-40 mL IntraVENous PRN    0.9% sodium chloride infusion  75 mL/hr IntraVENous CONTINUOUS    acetaminophen (TYLENOL) tablet 650 mg  650 mg Oral Q4H PRN    [Held by provider] heparin (porcine) injection 5,000 Units  5,000 Units SubCUTAneous Q8H    ondansetron (ZOFRAN) injection 4 mg  4 mg IntraVENous Q4H PRN    cefTRIAXone (ROCEPHIN) 1 g in 0.9% sodium chloride (MBP/ADV) 50 mL MBP  1 g IntraVENous Q24H    glucose chewable tablet 16 g  4 Tablet Oral PRN    dextrose (D50W) injection syrg 12.5-25 g  25-50 mL IntraVENous PRN    glucagon (GLUCAGEN) injection 1 mg  1 mg IntraMUSCular PRN    insulin lispro (HUMALOG) injection   SubCUTAneous Q6H         Data Review:   Labs:    Recent Results (from the past 24 hour(s))   NUCLEAR CARDIAC STRESS TEST    Collection Time: 07/21/21  3:14 PM   Result Value Ref Range    Target  bpm    Exercise duration time 00:03:00     Stress Base Systolic  mmHg    Stress Base Diastolic BP 79 mmHg    Post peak  BPM    Baseline HR 78 BPM    Estimated workload 1.0 METS    Baseline  mmHg    Percent HR 67 %    Stress Base Diastolic BP 73 mmHg    Stress Rate Pressure Product 15,900 BPM*mmHg    Stress Stage 1 Duration 1 min:sec    Stress Stage 1 HR 92 bpm    Stress Stage 1 /79 mmHg    Stress Stage 2 Duration 1 min:sec    Stress Stage 2 HR 98 bpm    Stress Stage 3 Duration 1 min:sec    Stress Stage 3 HR 98 bpm    Stress Stage 3 /63 mmHg    Recovery Stage 1 Duration 1 min:sec    Recovery Stage 1 HR 92 bpm    Recovery Stage 2 Duration 1 min:sec    Recovery Stage 2 HR 91 bpm    Recovery Stage 2 /61 mmHg   GLUCOSE, POC    Collection Time: 07/21/21  6:08 PM   Result Value Ref Range    Glucose (POC) 98 65 - 117 mg/dL    Performed by Adan River    WBC, STOOL    Collection Time: 07/21/21 10:21 PM   Result Value Ref Range    White blood cells, stool 0 TO 4 0 - 4 /HPF   GLUCOSE, POC    Collection Time: 07/21/21 10:56 PM   Result Value Ref Range    Glucose (POC) 171 (H) 65 - 117 mg/dL    Performed by Pilo MARTNI    CBC W/O DIFF    Collection Time: 07/22/21  5:41 AM   Result Value Ref Range    WBC 10.8 4.1 - 11.1 K/uL    RBC 3.95 (L) 4.10 - 5.70 M/uL    HGB 12.0 (L) 12.1 - 17.0 g/dL    HCT 37.0 36.6 - 50.3 %    MCV 93.7 80.0 - 99.0 FL    MCH 30.4 26.0 - 34.0 PG    MCHC 32.4 30.0 - 36.5 g/dL    RDW 14.4 11.5 - 14.5 %    PLATELET 88 (L) 632 - 400 K/uL    MPV 12.6 8.9 - 12.9 FL    NRBC 0.0 0  WBC    ABSOLUTE NRBC 0.00 0.00 - 2.71 K/uL   METABOLIC PANEL, BASIC    Collection Time: 07/22/21  5:41 AM   Result Value Ref Range    Sodium 138 136 - 145 mmol/L    Potassium 4.0 3.5 - 5.1 mmol/L    Chloride 111 (H) 97 - 108 mmol/L    CO2 20 (L) 21 - 32 mmol/L    Anion gap 7 5 - 15 mmol/L    Glucose 168 (H) 65 - 100 mg/dL    BUN 28 (H) 6 - 20 MG/DL    Creatinine 1.38 (H) 0.70 - 1.30 MG/DL    BUN/Creatinine ratio 20 12 - 20      GFR est AA >60 >60 ml/min/1.73m2    GFR est non-AA 51 (L) >60 ml/min/1.73m2    Calcium 8.0 (L) 8.5 - 10.1 MG/DL   GLUCOSE, POC    Collection Time: 07/22/21  7:29 AM   Result Value Ref Range    Glucose (POC) 144 (H) 65 - 117 mg/dL    Performed by Robby Osuna    ECHO ADULT COMPLETE    Collection Time: 07/22/21  9:22 AM   Result Value Ref Range    IVSd 0.93 0.60 - 1.00 cm    LVIDd 4.75 4.20 - 5.90 cm    LVIDs 2.63 cm    LVOT d 2.01 cm    LVPWd 0.90 0.60 - 1.00 cm    BP EF 65.1 55.0 - 100.0 percent    LV Ejection Fraction MOD 2C 68 percent    LV Ejection Fraction MOD 4C 63 percent    LV ED Vol A2C 111.71 mL    LV ED Vol A4C 101. 14 mL    LV ED Vol .26 67.0 - 155.0 mL    LV ES Vol A2C 35.97 mL    LV ES Vol A4C 37.09 mL    LV ES Vol BP 37.43 22.0 - 58.0 mL    LVOT Peak Gradient 3.17 mmHg    LVOT Peak Velocity 89.01 cm/s    Left Atrium Major Axis 5.36 cm    LA Volume 84.47 18.0 - 58.0 mL    LA Area 4C 24.67 cm2    LA Vol 2C 83.42 (A) 18.00 - 58.00 mL    LA Vol 4C 72.04 (A) 18.00 - 58.00 mL    Aortic Valve Area by Continuity of Peak Velocity 2.48 cm2    AoV PG 5.19 mmHg    Aortic Valve Systolic Peak Velocity 503.58 cm/s    MV E Ed 93.55 cm/s    LV E' Lateral Velocity 10.67 cm/s    LV E' Septal Velocity 8.38 cm/s    Pulmonic Valve Systolic Peak Instantaneous Gradient 1.74 mmHg    Tapse 1.82 1.50 - 2.00 cm    Triscuspid Valve Regurgitation Peak Gradient 15.96 mmHg    TR Max Velocity 199.75 cm/s    Ao Root D 3.78 cm    LV Mass .5 88.0 - 224.0 g    LV Mass AL Index 69.7 49.0 - 115.0 g/m2    E/E' lateral 8.77     E/E' septal 11.16     E/E' ratio (averaged) 9.97     LVES Vol Index BP 17.6 mL/m2    LVED Vol Index BP 50.4 mL/m2    Left Atrium Minor Axis 2.52 cm    LA Vol Index 39.66 16.00 - 28.00 ml/m2    LA Vol Index 39.16 16.00 - 28.00 ml/m2    LA Vol Index 33.82 16.00 - 28.00 ml/m2    LVED Vol Index A4C 47.5 mL/m2    LVED Vol Index A2C 52.4 mL/m2    LVES Vol Index A4C 17.4 mL/m2    LVES Vol Index A2C 16.9 mL/m2    NEVA/BSA Pk Ed 1.2 cm2/m2   GLUCOSE, POC    Collection Time: 07/22/21 11:18 AM   Result Value Ref Range    Glucose (POC) 218 (H) 65 - 117 mg/dL    Performed by KarthikMonoSphere Runner

## 2021-07-23 VITALS
HEIGHT: 70 IN | SYSTOLIC BLOOD PRESSURE: 160 MMHG | BODY MASS INDEX: 30.06 KG/M2 | DIASTOLIC BLOOD PRESSURE: 72 MMHG | HEART RATE: 61 BPM | TEMPERATURE: 99 F | OXYGEN SATURATION: 98 % | WEIGHT: 210 LBS | RESPIRATION RATE: 19 BRPM

## 2021-07-23 LAB
ALBUMIN SERPL-MCNC: 2.6 G/DL (ref 3.5–5)
ANION GAP SERPL CALC-SCNC: 5 MMOL/L (ref 5–15)
BASOPHILS # BLD: 0 K/UL (ref 0–0.1)
BASOPHILS NFR BLD: 0 % (ref 0–1)
BUN SERPL-MCNC: 19 MG/DL (ref 6–20)
BUN/CREAT SERPL: 14 (ref 12–20)
CALCIUM SERPL-MCNC: 8.2 MG/DL (ref 8.5–10.1)
CHLORIDE SERPL-SCNC: 109 MMOL/L (ref 97–108)
CO2 SERPL-SCNC: 25 MMOL/L (ref 21–32)
CREAT SERPL-MCNC: 1.38 MG/DL (ref 0.7–1.3)
DIFFERENTIAL METHOD BLD: ABNORMAL
EOSINOPHIL # BLD: 0.1 K/UL (ref 0–0.4)
EOSINOPHIL NFR BLD: 1 % (ref 0–7)
ERYTHROCYTE [DISTWIDTH] IN BLOOD BY AUTOMATED COUNT: 14.5 % (ref 11.5–14.5)
GLUCOSE BLD STRIP.AUTO-MCNC: 130 MG/DL (ref 65–117)
GLUCOSE BLD STRIP.AUTO-MCNC: 205 MG/DL (ref 65–117)
GLUCOSE SERPL-MCNC: 206 MG/DL (ref 65–100)
HCT VFR BLD AUTO: 37.6 % (ref 36.6–50.3)
HGB BLD-MCNC: 12.5 G/DL (ref 12.1–17)
IMM GRANULOCYTES # BLD AUTO: 0 K/UL (ref 0–0.04)
IMM GRANULOCYTES NFR BLD AUTO: 0 % (ref 0–0.5)
LACTATE SERPL-SCNC: 0.7 MMOL/L (ref 0.4–2)
LACTATE SERPL-SCNC: 1.1 MMOL/L (ref 0.4–2)
LYMPHOCYTES # BLD: 0.9 K/UL (ref 0.8–3.5)
LYMPHOCYTES NFR BLD: 10 % (ref 12–49)
MCH RBC QN AUTO: 31.1 PG (ref 26–34)
MCHC RBC AUTO-ENTMCNC: 33.2 G/DL (ref 30–36.5)
MCV RBC AUTO: 93.5 FL (ref 80–99)
MONOCYTES # BLD: 0.9 K/UL (ref 0–1)
MONOCYTES NFR BLD: 10 % (ref 5–13)
NEUTS SEG # BLD: 6.9 K/UL (ref 1.8–8)
NEUTS SEG NFR BLD: 79 % (ref 32–75)
NRBC # BLD: 0 K/UL (ref 0–0.01)
NRBC BLD-RTO: 0 PER 100 WBC
PHOSPHATE SERPL-MCNC: 2 MG/DL (ref 2.6–4.7)
PLATELET # BLD AUTO: 106 K/UL (ref 150–400)
PMV BLD AUTO: 12.5 FL (ref 8.9–12.9)
POTASSIUM SERPL-SCNC: 3.8 MMOL/L (ref 3.5–5.1)
RBC # BLD AUTO: 4.02 M/UL (ref 4.1–5.7)
SERVICE CMNT-IMP: ABNORMAL
SERVICE CMNT-IMP: ABNORMAL
SODIUM SERPL-SCNC: 139 MMOL/L (ref 136–145)
WBC # BLD AUTO: 8.8 K/UL (ref 4.1–11.1)

## 2021-07-23 PROCEDURE — 94760 N-INVAS EAR/PLS OXIMETRY 1: CPT

## 2021-07-23 PROCEDURE — 74011000258 HC RX REV CODE- 258: Performed by: FAMILY MEDICINE

## 2021-07-23 PROCEDURE — 80069 RENAL FUNCTION PANEL: CPT

## 2021-07-23 PROCEDURE — 83605 ASSAY OF LACTIC ACID: CPT

## 2021-07-23 PROCEDURE — 85025 COMPLETE CBC W/AUTO DIFF WBC: CPT

## 2021-07-23 PROCEDURE — 36415 COLL VENOUS BLD VENIPUNCTURE: CPT

## 2021-07-23 PROCEDURE — 74011250636 HC RX REV CODE- 250/636: Performed by: FAMILY MEDICINE

## 2021-07-23 PROCEDURE — 74011250637 HC RX REV CODE- 250/637: Performed by: FAMILY MEDICINE

## 2021-07-23 PROCEDURE — 74011250637 HC RX REV CODE- 250/637: Performed by: INTERNAL MEDICINE

## 2021-07-23 PROCEDURE — 74011250637 HC RX REV CODE- 250/637: Performed by: HOSPITALIST

## 2021-07-23 PROCEDURE — 74011636637 HC RX REV CODE- 636/637: Performed by: FAMILY MEDICINE

## 2021-07-23 PROCEDURE — 93005 ELECTROCARDIOGRAM TRACING: CPT

## 2021-07-23 PROCEDURE — 82962 GLUCOSE BLOOD TEST: CPT

## 2021-07-23 PROCEDURE — 74011636637 HC RX REV CODE- 636/637: Performed by: INTERNAL MEDICINE

## 2021-07-23 PROCEDURE — 97161 PT EVAL LOW COMPLEX 20 MIN: CPT

## 2021-07-23 PROCEDURE — 77010033678 HC OXYGEN DAILY

## 2021-07-23 PROCEDURE — 51798 US URINE CAPACITY MEASURE: CPT

## 2021-07-23 RX ORDER — TAMSULOSIN HYDROCHLORIDE 0.4 MG/1
0.4 CAPSULE ORAL DAILY
Qty: 30 CAPSULE | Refills: 0 | Status: SHIPPED | OUTPATIENT
Start: 2021-07-24 | End: 2022-04-12

## 2021-07-23 RX ORDER — PHENAZOPYRIDINE HYDROCHLORIDE 100 MG/1
100 TABLET, FILM COATED ORAL
Status: DISCONTINUED | OUTPATIENT
Start: 2021-07-23 | End: 2021-07-23 | Stop reason: HOSPADM

## 2021-07-23 RX ORDER — LISINOPRIL 20 MG/1
40 TABLET ORAL DAILY
Status: DISCONTINUED | OUTPATIENT
Start: 2021-07-23 | End: 2021-07-23 | Stop reason: HOSPADM

## 2021-07-23 RX ORDER — GUAIFENESIN 100 MG/5ML
81 LIQUID (ML) ORAL DAILY
COMMUNITY
End: 2022-04-27

## 2021-07-23 RX ORDER — CEFPODOXIME PROXETIL 200 MG/1
200 TABLET, FILM COATED ORAL 2 TIMES DAILY
Qty: 14 TABLET | Refills: 0 | Status: SHIPPED | OUTPATIENT
Start: 2021-07-23 | End: 2021-07-23 | Stop reason: SDUPTHER

## 2021-07-23 RX ORDER — CEFPODOXIME PROXETIL 200 MG/1
200 TABLET, FILM COATED ORAL 2 TIMES DAILY
Qty: 12 TABLET | Refills: 0 | Status: SHIPPED | OUTPATIENT
Start: 2021-07-23 | End: 2022-04-12

## 2021-07-23 RX ORDER — VANCOMYCIN HYDROCHLORIDE 125 MG/1
125 CAPSULE ORAL 4 TIMES DAILY
Qty: 40 CAPSULE | Refills: 0 | Status: SHIPPED | OUTPATIENT
Start: 2021-07-23 | End: 2022-04-12

## 2021-07-23 RX ORDER — PHENAZOPYRIDINE HYDROCHLORIDE 100 MG/1
100 TABLET, FILM COATED ORAL
Qty: 15 TABLET | Refills: 0 | Status: SHIPPED | OUTPATIENT
Start: 2021-07-23 | End: 2021-07-26

## 2021-07-23 RX ORDER — VANCOMYCIN HYDROCHLORIDE 250 MG/5ML
125 POWDER, FOR SOLUTION ORAL EVERY 6 HOURS
Status: DISCONTINUED | OUTPATIENT
Start: 2021-07-23 | End: 2021-07-23 | Stop reason: HOSPADM

## 2021-07-23 RX ADMIN — LISINOPRIL 40 MG: 20 TABLET ORAL at 15:43

## 2021-07-23 RX ADMIN — VANCOMYCIN HYDROCHLORIDE 125 MG: KIT at 10:11

## 2021-07-23 RX ADMIN — VANCOMYCIN HYDROCHLORIDE 125 MG: KIT at 12:29

## 2021-07-23 RX ADMIN — INSULIN GLARGINE 20 UNITS: 100 INJECTION, SOLUTION SUBCUTANEOUS at 10:01

## 2021-07-23 RX ADMIN — INSULIN LISPRO 3 UNITS: 100 INJECTION, SOLUTION INTRAVENOUS; SUBCUTANEOUS at 12:27

## 2021-07-23 RX ADMIN — Medication 10 ML: at 07:43

## 2021-07-23 RX ADMIN — TAMSULOSIN HYDROCHLORIDE 0.4 MG: 0.4 CAPSULE ORAL at 10:01

## 2021-07-23 RX ADMIN — CEFTRIAXONE SODIUM 1 G: 1 INJECTION, POWDER, FOR SOLUTION INTRAMUSCULAR; INTRAVENOUS at 02:07

## 2021-07-23 RX ADMIN — CLOPIDOGREL BISULFATE 75 MG: 75 TABLET ORAL at 10:01

## 2021-07-23 RX ADMIN — Medication 10 ML: at 15:43

## 2021-07-23 RX ADMIN — ASPIRIN 81 MG: 81 TABLET, COATED ORAL at 10:01

## 2021-07-23 NOTE — PROGRESS NOTES
PHYSICAL THERAPY EVALUATION/DISCHARGE  Patient: Kvng Andrade (75 y.o. male)  Date: 7/23/2021  Primary Diagnosis: Metabolic encephalopathy [P06.57]       Precautions:          ASSESSMENT  Based on the objective data described below, the patient presents with full AROM, normal strength, steady dynamic standing balance and steady gait s/p admission for metabolic encephalopathy. Patient's confusion resolved and oriented today with good judgement and safety awareness. Exhibited no concerns or limitations with mobility, does have very mild R sided weakness slightly perceptible compared with L, due to old CVA. Patient lives with his wife in a two level home and was active and indep PTA, walks daily for exercise. No further therapy needs anticipated at this time, will complete order. Functional Outcome Measure: The patient scored 27 on the Tinetti outcome measure which is indicative of low fall risk. Other factors to consider for discharge: none     Further skilled acute physical therapy is not indicated at this time. PLAN :  Recommendation for discharge: (in order for the patient to meet his/her long term goals)  No skilled physical therapy/ follow up rehabilitation needs identified at this time. This discharge recommendation:  Has been made in collaboration with the attending provider and/or case management    IF patient discharges home will need the following DME: none       SUBJECTIVE:   Patient stated I really want to take a shower.     OBJECTIVE DATA SUMMARY:   HISTORY:    Past Medical History:   Diagnosis Date    Diabetes ([address]     H/O echocardiogram 2014    50-55%  small apical defect    Hypercholesterolemia     Stroke [facility])      Past Surgical History:   Procedure Laterality Date    COLONOSCOPY N/A 11/11/2020    COLONOSCOPY performed by Xavier Link MD at [facility] ENDOSCOPY       Prior level of function: indep PTA without DME  Personal factors and/or comorbidities impacting plan of care: old CVA    Home Situation  Home Environment: Private residence  # Steps to Enter: 3  Rails to Enter: Yes  Hand Rails : Bilateral  One/Two Story Residence: Two story  Living Alone: No  Support Systems: Spouse/Significant Other/Partner  Patient Expects to be Discharged to[de-identified] House  Current DME Used/Available at Home: None (but owns a RW and SPC)  Tub or Shower Type: Shower    EXAMINATION/PRESENTATION/DECISION MAKING:   Critical Behavior:  Neurologic State: Alert  Orientation Level: Oriented X4  Cognition: Appropriate decision making, Appropriate safety awareness       Range Of Motion:  AROM: Within functional limits           PROM: Within functional limits           Strength:    Strength: Generally decreased, functional (very mild residual R side weakness from prior CVA)                    Tone & Sensation:   Tone: Normal              Sensation: Intact               Coordination:  Coordination: Within functional limits  Vision:      Functional Mobility:  Bed Mobility:  Rolling: Independent  Supine to Sit: Independent  Sit to Supine: Independent  Scooting: Independent  Transfers:  Sit to Stand: Independent  Stand to Sit: Independent        Bed to Chair: Independent              Balance:   Sitting: Intact  Standing: Intact  Ambulation/Gait Training:  Distance (ft): 300 Feet (ft)  Assistive Device: Gait belt  Ambulation - Level of Assistance: Independent                                        Functional Measure:  Tinetti test:    Sitting Balance: 1  Arises: 2  Attempts to Rise: 2  Immediate Standing Balance: 2  Standing Balance: 2  Nudged: 2  Eyes Closed: 1  Turn 360 Degrees - Continuous/Discontinuous: 1  Turn 360 Degrees - Steady/Unsteady: 1  Sitting Down: 2  Balance Score: 16 Balance total score  Indication of Gait: 1  R Step Length/Height: 1  L Step Length/Height: 1  R Foot Clearance: 1  L Foot Clearance: 1  Step Symmetry: 1  Step Continuity: 1  Path: 2  Trunk: 2  Walking Time: 0  Gait Score: 11 Gait total score  Total Score: 27/28 Overall total score         Tinetti Tool Score Risk of Falls  <19 = High Fall Risk  19-24 = Moderate Fall Risk  25-28 = Low Fall Risk  Tinetti ME. Performance-Oriented Assessment of Mobility Problems in Elderly Patients. Paul 66; [id number]. (Scoring Description: PT Bulletin Feb. 10, 1993)    Older adults: David Marjorie et al, 2009; n = 1000 [facility] elderly evaluated with ABC, CHRISTIAN, ADL, and IADL)  · Mean CHRISTIAN score for males aged 69-68 years = 26.21(3.40)  · Mean CHRISTIAN score for females age 69-68 years = 25.16(4.30)  · Mean CHRISTIAN score for males over 80 years = 23.29(6.02)  · Mean CHRISTIAN score for females over 80 years = 17.20(8.32)            Physical Therapy Evaluation Charge Determination   History Examination Presentation Decision-Making   HIGH Complexity :3+ comorbidities / personal factors will impact the outcome/ POC  LOW Complexity : 1-2 Standardized tests and measures addressing body structure, function, activity limitation and / or participation in recreation  LOW Complexity : Stable, uncomplicated  Other outcome measures Tinetti 27  LOW       Based on the above components, the patient evaluation is determined to be of the following complexity level: LOW     Pain Rating:  None reported    Activity Tolerance:   Good and Fair      After treatment patient left in no apparent distress:   Sitting in chair, Call bell within reach and Caregiver / family present    COMMUNICATION/EDUCATION:   The patients plan of care was discussed with: Registered nurse. Fall prevention education was provided and the patient/caregiver indicated understanding.     Thank you for this referral.  Omar Sargent, PT   Time Calculation: 16 mins

## 2021-07-23 NOTE — PROGRESS NOTES
Bedside shift change report given to Leticia Wilkinson (oncoming nurse) by Joe Gonzalez (offgoing nurse). Report included the following information SBAR, Kardex and MAR.

## 2021-07-23 NOTE — PROGRESS NOTES
Hospitalist Progress Note  Kelvin Martin MD  Answering service: [phone] from in house phone      Date of Service:  2021  NAME:  Mario Delgado :  1950  MRN:  [id number]      Admission Summary:   Mario Delgado is a 70 y.o. male who presents with altered mental status     History was primarily obtained from patient's wife, patient is confused.     Patient's wife reports that patient was recently in [place], came to [place], last night started feeling rigors and chills, he also had some dysuria with burning when he urinated. Patient also complained of some chest pain and shortness of breath. Patient was also having some knee pain, patient came to the ER, was diagnosed with a UTI and was discharged home on p.o. antibiotics. Per family when the patient reached home he continued to have some shortness of breath, was having dry heaving associated with trouble breathing, became confused and was having trouble walking. The family got concerned and decided to bring the patient to the hospital.  Patient was found to be confused while in the hospital and was requested to be admitted to the hospitalist service. Patient is arousable but is not able to answer many questions. No further history could be obtained from patient's family or patient himself. Per wife patient has history of CVA and has residual right lower extremity weakness    Interval history / Subjective:      Patient seen and examined     Denied any diarrhea today,still has some difficulty urination.       Assessment & Plan:     # Sepsis (POA)-resolved due to UTI  - sepsis based on tachycardia, elevated wbc, lactic acidosis with source of infection urinary tract   - Negtaive blood and urine culture - E coli  - continue on ceftriaxone   - IVF   Check post void scan,add tamsulosin    # Metabolic encephalopathy: resolved     #Chest pain-resolved  - stress test negative   - on Aspirin and Plavix   - cardiology following     # Diabetes mellitus type 2   - On Lantus BID, lispro  A1c:7.1    # ELI on CKD  ELI likely prerenal  Renal function improved    # Diarrhea:resolved,likely abx asscoaited  pending stool c diff, enteric bacteria, wbc    DVT ppx  Code: Full      Hospital Problems  Date Reviewed: 2/13/2016        Codes Class Noted POA    Metabolic encephalopathy THU-57-EQ: G93.41  ICD-9-CM: 348.31  7/20/2021 Unknown                Review of Systems:   Pertinent positive mentioned in interval history/HPI. Other systems reviewed and negative    Vital Signs:    Last 24hrs VS reviewed since prior progress note. Most recent are:  Visit Vitals  BP (!) 151/77 (BP 1 Location: Left upper arm, BP Patient Position: At rest)   Pulse 81   Temp 100.1 °F (37.8 °C)   Resp 18   Ht 5' 10\" (1.778 m)   Wt 95.3 kg (210 lb)   SpO2 (!) 81%   BMI 30.13 kg/m²         Intake/Output Summary (Last 24 hours) at 7/22/2021 2327  Last data filed at 7/22/2021 5013  Gross per 24 hour   Intake 240 ml   Output    Net 240 ml        Physical Examination:             Constitutional:  No acute distress, cooperative, pleasant    ENT:  Oral mucous moist, oropharynx benign. Neck supple,    Resp:  CTA bilaterally. No wheezing/rhonchi/rales. No accessory muscle use   CV:  Regular rhythm, normal rate, no murmurs, gallops, rubs    GI:  Soft, non distended, non tender. normoactive bowel sounds, no hepatosplenomegaly     Musculoskeletal:  No edema, warm, 2+ pulses throughout    Neurologic:  Moves all extremities.   AAOx3, CN II-XII reviewed           Data Review:   I reviewed labs and imaging    Labs:     Recent Labs     07/22/21  0541 07/21/21  0419   WBC 10.8 14.3*   HGB 12.0* 12.0*   HCT 37.0 36.3*   PLT 88* 88*     Recent Labs     07/22/21  0541 07/21/21  0419 07/20/21  1445 07/20/21  0804    138  --  139   K 4.0 3.7  --  3.6   * 111*  --  109*   CO2 20* 21  --  22   BUN 28* 31*  --  26*   CREA 1.38* 1.67* --  1.79*   * 183*  --  218*   CA 8.0* 8.0*  --  8.7   MG  --   --  1.6  --      Recent Labs     07/21/21  0419   ALT 20   AP 65   TBILI 0.6   TP 6.0*   ALB 2.5*   GLOB 3.5     No results for input(s): INR, PTP, APTT, INREXT, INREXT in the last 72 hours. No results for input(s): FE, TIBC, PSAT, FERR in the last 72 hours. No results found for: FOL, RBCF   No results for input(s): PH, PCO2, PO2 in the last 72 hours.   Recent Labs     07/21/21  0419 07/20/21  1912 07/20/21  1445 07/20/21  1208   * 950* 814*  --    TROIQ  --  0.46* 0.52* 0.43*     Lab Results   Component Value Date/Time    Cholesterol, total 127 07/20/2021 02:45 PM    HDL Cholesterol 41 07/20/2021 02:45 PM    LDL, calculated 65.2 07/20/2021 02:45 PM    Triglyceride 104 07/20/2021 02:45 PM    CHOL/HDL Ratio 3.1 07/20/2021 02:45 PM     Lab Results   Component Value Date/Time    Glucose (POC) 155 (H) 07/22/2021 11:22 PM    Glucose (POC) 181 (H) 07/22/2021 09:07 PM    Glucose (POC) 177 (H) 07/22/2021 05:52 PM    Glucose (POC) 218 (H) 07/22/2021 11:18 AM    Glucose (POC) 144 (H) 07/22/2021 07:29 AM     Lab Results   Component Value Date/Time    Color YELLOW/STRAW 07/20/2021 12:45 AM    Appearance CLOUDY (A) 07/20/2021 12:45 AM    Specific gravity 1.016 07/20/2021 12:45 AM    pH (UA) 5.5 07/20/2021 12:45 AM    Protein 30 (A) 07/20/2021 12:45 AM    Glucose Negative 07/20/2021 12:45 AM    Ketone Negative 07/20/2021 12:45 AM    Bilirubin Negative 07/20/2021 12:45 AM    Urobilinogen 0.2 07/20/2021 12:45 AM    Nitrites Positive (A) 07/20/2021 12:45 AM    Leukocyte Esterase SMALL (A) 07/20/2021 12:45 AM    Epithelial cells FEW 07/20/2021 12:45 AM    Bacteria 4+ (A) 07/20/2021 12:45 AM    WBC 20-50 07/20/2021 12:45 AM    RBC 0-5 07/20/2021 12:45 AM         Medications Reviewed:     Current Facility-Administered Medications   Medication Dose Route Frequency    aspirin delayed-release tablet 81 mg  81 mg Oral DAILY    insulin glargine (LANTUS) injection 20 Units  20 Units SubCUTAneous DAILY    insulin glargine (LANTUS) injection 20 Units  20 Units SubCUTAneous QHS    clopidogreL (PLAVIX) tablet 75 mg  75 mg Oral DAILY    sodium chloride (NS) flush 5-40 mL  5-40 mL IntraVENous Q8H    sodium chloride (NS) flush 5-40 mL  5-40 mL IntraVENous PRN    0.9% sodium chloride infusion  75 mL/hr IntraVENous CONTINUOUS    acetaminophen (TYLENOL) tablet 650 mg  650 mg Oral Q4H PRN    [Held by provider] heparin (porcine) injection 5,000 Units  5,000 Units SubCUTAneous Q8H    ondansetron (ZOFRAN) injection 4 mg  4 mg IntraVENous Q4H PRN    cefTRIAXone (ROCEPHIN) 1 g in 0.9% sodium chloride (MBP/ADV) 50 mL MBP  1 g IntraVENous Q24H    glucose chewable tablet 16 g  4 Tablet Oral PRN    dextrose (D50W) injection syrg 12.5-25 g  25-50 mL IntraVENous PRN    glucagon (GLUCAGEN) injection 1 mg  1 mg IntraMUSCular PRN    insulin lispro (HUMALOG) injection   SubCUTAneous Q6H     ______________________________________________________________________  EXPECTED LENGTH OF STAY: 4d 19h  ACTUAL LENGTH OF STAY:          2                 Tracey Paul MD   Patient has given Verbal permission to discuss medical care with   persons present in the room and and also with contact as listed on face sheet. Please note that this dictation was completed with Vim[employer], the computer voice recognition software. Quite often unanticipated grammatical, syntax, homophones, and other interpretive errors are inadvertently transcribed by the computer software. Please disregard these errors. Please excuse any errors that have escaped final proofreading. Thank you.

## 2021-07-23 NOTE — PROGRESS NOTES
The patient is discharging today home with family. He does not have any pt/ot needs or cm needs at this time.      Elizabeth Jones [facility]

## 2021-07-23 NOTE — PROGRESS NOTES
Problem: Falls - Risk of  Goal: *Absence of Falls  Description: Document Chad Pruitt Fall Risk and appropriate interventions in the flowsheet. Outcome: Progressing Towards Goal  Note: Fall Risk Interventions:  Mobility Interventions: Communicate number of staff needed for ambulation/transfer, OT consult for ADLs, Patient to call before getting OOB, PT Consult for mobility concerns, PT Consult for assist device competence, Strengthening exercises (ROM-active/passive)         Medication Interventions: Evaluate medications/consider consulting pharmacy, Patient to call before getting OOB, Teach patient to arise slowly    Elimination Interventions: Call light in reach, Elevated toilet seat, Patient to call for help with toileting needs, Stay With Me (per policy), Toilet paper/wipes in reach, Toileting schedule/hourly rounds, Urinal in reach              Problem: Patient Education: Go to Patient Education Activity  Goal: Patient/Family Education  Outcome: Progressing Towards Goal     Problem: Diabetes Self-Management  Goal: *Disease process and treatment process  Description: Define diabetes and identify own type of diabetes; list 3 options for treating diabetes. Outcome: Progressing Towards Goal  Goal: *Incorporating nutritional management into lifestyle  Description: Describe effect of type, amount and timing of food on blood glucose; list 3 methods for planning meals. Outcome: Progressing Towards Goal  Goal: *Incorporating physical activity into lifestyle  Description: State effect of exercise on blood glucose levels. Outcome: Progressing Towards Goal  Goal: *Developing strategies to promote health/change behavior  Description: Define the ABC's of diabetes; identify appropriate screenings, schedule and personal plan for screenings.   Outcome: Progressing Towards Goal  Goal: *Using medications safely  Description: State effect of diabetes medications on diabetes; name diabetes medication taking, action and side effects. Outcome: Progressing Towards Goal  Goal: *Monitoring blood glucose, interpreting and using results  Description: Identify recommended blood glucose targets  and personal targets. Outcome: Progressing Towards Goal  Goal: *Prevention, detection, treatment of acute complications  Description: List symptoms of hyper- and hypoglycemia; describe how to treat low blood sugar and actions for lowering  high blood glucose level. Outcome: Progressing Towards Goal  Goal: *Prevention, detection and treatment of chronic complications  Description: Define the natural course of diabetes and describe the relationship of blood glucose levels to long term complications of diabetes. Outcome: Progressing Towards Goal  Goal: *Developing strategies to address psychosocial issues  Description: Describe feelings about living with diabetes; identify support needed and support network  Outcome: Progressing Towards Goal  Goal: *Insulin pump training  Outcome: Progressing Towards Goal  Goal: *Sick day guidelines  Outcome: Progressing Towards Goal  Goal: *Patient Specific Goal (EDIT GOAL, INSERT TEXT)  Outcome: Progressing Towards Goal     Problem: Patient Education: Go to Patient Education Activity  Goal: Patient/Family Education  Outcome: Progressing Towards Goal     Problem: Risk for Spread of Infection  Goal: Prevent transmission of infectious organism to others  Description: Prevent the transmission of infectious organisms to other patients, staff members, and visitors.   Outcome: Progressing Towards Goal     Problem: Patient Education:  Go to Education Activity  Goal: Patient/Family Education  Outcome: Progressing Towards Goal     Problem: Breathing Pattern - Ineffective  Goal: *Absence of hypoxia  Outcome: Progressing Towards Goal  Goal: *Use of effective breathing techniques  Outcome: Progressing Towards Goal     Problem: Patient Education: Go to Patient Education Activity  Goal: Patient/Family Education  Outcome: Progressing Towards Goal

## 2021-07-23 NOTE — CONSULTS
Requesting Provider: Emily Sorto MD - Reason for Consultation: \"difficulty urinating\"  Pre-existing [facility] Patient:   No                Patient: Rhiannon Millard MRN: [id number]  SSN: [id number]    YOB: 1950  Age: 70 y.o. Sex: male     Location: 211/01       Code Status: Full Code   PCP: Nathalia Handy MD  - [phone]   Emergency Contact:  Primary Emergency Contact: Ashley Mohs, Home Phone: [phone]   Race/Faith/Language: BLACK/ / Werner Moreau / Shawn Rush   Payor: Payor: [place] / Plan: Amanuel Masterson / Product Type: Medicare /    Prior Admission Data: 7/20/21 [facility] EMERGENCY DEPT     Hospitalized:  Hospital Day: 4 - Admitted 7/20/2021 10:30 AM     CONSULTANTS  IP CONSULT TO HOSPITALIST  IP CONSULT TO CARDIOLOGY  IP CONSULT TO [address] [place] [address]    ICD-10-CM ICD-9-CM   1. Pyelonephritis  N12 590.80   2. Bacteremia  R78.81 790.7         Assessment/Plan:       · Sepsis 2/2 E. Coli UTI  · Enlarged prostate  · Dysuria  · Hx of elevated PSA    - Continue broad spectrum abx. Follow culture then tailor abx. - Bladder scan  cc. Okay to continue to void independently. Continue Flomax. - Start pyridium PRN dysuria.   - Outpatient follow up after DC with repeat PSA. Will sign off. Please call with questions. Supervising MD, Dr. Bertram Manley. CC: Other   HPI: He is a 70 y.o. 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 Admitted for metabolic encephalopathy and sepsis 2/2 UTI.      Seen in consultation by Urology for prostatomegaly and complaints of difficulty with urination. He is a known patient to [facility], followed by Dr. Katelyn Alcala, for BPH and an Elevated PSA. Prostate 45-50 g. Last seen in 2018. See last OV note below. He admits pain with urination.  He denies fevers, chills, hematuria, abd pain, or incomplete emptying. Prior to this admission, he denies incomplete bladder emptying of hx of retention. Admits daytime frequency and nocturia 4x nightly. He has never taken medication for his symptoms. Denies FH of prostate CA. Bladder scan post-void 100 cc at the bedside. Tmax 100.8. Now 99.4. BP stable. WBC 10.8  hgb 12  Cr 1.67 to 1.38. Lactic 2.8 to 0.7  UA +nitrites, small leuks, 20-50 WBCs, 4+ bacteria, 0-5 RBCs. UCx >100,000 e coli  BCx NGTD  +cdiff  +Rocephin, Vanc  +flomax     CT A/P findings significant for an enlarged prostate measuring 61 mm. No renal or ureteral stones or hydro. Images personally reviewed. Problem: difficulty with urination; Location: bladder; Quality:100 cc PVR, Severity: mild; Timin days, Context: enlarged prostate; Associated s/s: frequency, nocturia      ======last OV note====  Yoon Halima is a 76year old male who presents today for \"Elevated PSA\". He returns for follow-up. 70-year-old gentleman seen in six-month follow-up. He is followed due to BPH and elevated PSA. I saw him for the first time in February. He has had slightly elevated but stable PSA. SHANTANU was normal in February. No known family history of prostate cancer. He denies any erectile dysfunction or change in urination since I saw him. When I saw him in February we discussed options and I recommended six-month follow-up with repeat exam and PSA. PSA today is 4.6. He has not had any new symptoms. PAST MEDICAL HISTORY:    Allergies: No known allergies. DENIES: Latex, Shellfish, X-Ray Dye, Iodine.      Medications: GLIPIZIDE ER 10 MG ORAL TABLET EXTENDED RELEASE 24 HOUR (GLIPIZIDE) qd  PRAVASTATIN SODIUM 40 MG ORAL TABLET (PRAVASTATIN SODIUM) qd  LISINOPRIL 40 MG ORAL TABLET (LISINOPRIL) qd  CHLORTHALIDONE 25 MG ORAL TABLET (CHLORTHALIDONE) qd  CLOPIDOGREL BISULFATE 75 MG ORAL TABLET (CLOPIDOGREL BISULFATE) qc  * INSULIN, ASPART 16 u before meals  * INSULIN/ GLARINE 22  units am 20 unit pm    Problems: BPH without urinary obstruction (ICD-600.00) (WSI87-F12.0)  Elevated PSA (ICD-790.93) (AXS05-H25.20)    Illnesses: Diabetes, High Blood Pressure, and Stroke/Seizure. DENIES: Heart Disease, Pacemaker/Defibrillator, Lung Disease, Bowel Problems, Kidney Problems, Bleeding Problems, HIV, Hepatitis, or Cancer. Surgeries: DENIES prior surgeries      Family History: DENIES: Prostate cancer, Kidney cancer, Kidney disease, Kidney stones. Social History: Retired. . Smoking status: former smoker. Does not drink alcohol. System Review: Admits to: Leg Swelling and Difficulty Walking. DENIES: Unexplained Weight Loss, Dry Eyes, Dry Mouth, Shortness of Breath, Constipation, Involuntary Urine Loss, Lower Extremity Weakness, Dry Skin, Psychiatric Problems, Impaired Sex Drive, Easy Bleeding, Rash. Prostate 45-50 g. No nodules. URINALYSIS  Urine Micro not done    PSA HISTORY  4.9 ng/ml on 2017  5.0 ng/ml on 10/30/2001    IMPRESSION:    1. ELEVATED PSA (CRF73-Z24.20) - Unchanged: PSA slightly above normal but stable and acceptable given his large prostate. We can safely follow. Drop interval follow-up down to 1 year with PSA and repeat exam.    2. BPH WITHOUT URINARY OBSTRUCTION (INL03-X26.0) - Unchanged: Large prostate but no significant urinary symptoms. No medications required.          cc: Mike Feliz MD  Transcribed by Speech to Text Technology  [employer]  E&M Service    Upcoming Orders  Return Office Visit - with Shiv Do MD in 1 year  PSA, UA      ]      Electronically signed by Shiv Do MD on 2018 at 9:07 AM    ________________________________________________________________________      Temp (24hrs), Av.9 °F (37.7 °C), Min:98.7 °F (37.1 °C), Max:100.8 °F (38.2 °C)    Urinary Status: Voiding  Creatinine   Date/Time Value Ref Range Status   2021 05:41 AM 1.38 (H) 0.70 - 1.30 MG/DL Final   2021 04:19 AM 1.67 (H) 0.70 - 1.30 MG/DL Final   07/20/2021 08:04 AM 1.79 (H) 0.70 - 1.30 MG/DL Final   07/19/2021 10:25 PM 1.57 (H) 0.70 - 1.30 MG/DL Final   02/13/2016 06:14 AM 1.14 0.70 - 1.30 MG/DL Final     Current Antimicrobial Therapy (168h ago, onward)     Ordered     Start Stop    07/23/21 0756  vancomycin (FIRVANQ) 50 mg/mL oral solution 125 mg  125 mg,   Oral,   EVERY 6 HOURS      07/23/21 0800 08/02/21 0559    07/20/21 1402  cefTRIAXone (ROCEPHIN) 1 g in 0.9% sodium chloride (MBP/ADV) 50 mL MBP  1 g,   IntraVENous,   EVERY 24 HOURS      07/21/21 0200 07/28/21 0159              Key Anti-Platelet Anticoagulant Meds             aspirin delayed-release 325 mg tablet (Taking) Take 1 Tab by mouth two (2) times a day. Diet: ADULT DIET Regular;  Low Sodium (2 gm) -       Labs     Lab Results   Component Value Date/Time    Lactic acid 0.7 07/23/2021 03:12 AM    Lactic acid 1.8 07/20/2021 07:12 PM    Lactic acid 2.8 (HH) 07/20/2021 02:45 PM    WBC 10.8 07/22/2021 05:41 AM    HCT 37.0 07/22/2021 05:41 AM    PLATELET 88 (L) 98/64/6534 05:41 AM    Sodium 138 07/22/2021 05:41 AM    Potassium 4.0 07/22/2021 05:41 AM    Chloride 111 (H) 07/22/2021 05:41 AM    CO2 20 (L) 07/22/2021 05:41 AM    BUN 28 (H) 07/22/2021 05:41 AM    Creatinine 1.38 (H) 07/22/2021 05:41 AM    Glucose 168 (H) 07/22/2021 05:41 AM    Calcium 8.0 (L) 07/22/2021 05:41 AM    Magnesium 1.6 07/20/2021 02:45 PM    INR 1.0 02/10/2016 12:57 PM     UA:   Lab Results   Component Value Date/Time    Color YELLOW/STRAW 07/20/2021 12:45 AM    Appearance CLOUDY (A) 07/20/2021 12:45 AM    Specific gravity 1.016 07/20/2021 12:45 AM    pH (UA) 5.5 07/20/2021 12:45 AM    Protein 30 (A) 07/20/2021 12:45 AM    Glucose Negative 07/20/2021 12:45 AM    Ketone Negative 07/20/2021 12:45 AM    Bilirubin Negative 07/20/2021 12:45 AM    Urobilinogen 0.2 07/20/2021 12:45 AM    Nitrites Positive (A) 07/20/2021 12:45 AM    Leukocyte Esterase SMALL (A) 07/20/2021 12:45 AM    Epithelial cells FEW 07/20/2021 12:45 AM    Bacteria 4+ (A) 07/20/2021 12:45 AM    WBC 20-50 07/20/2021 12:45 AM    RBC 0-5 07/20/2021 12:45 AM     Imaging     Results for orders placed during the hospital encounter of 07/20/21    CT HEAD WO CONT    Narrative  CLINICAL HISTORY: AMS  INDICATION: AMS  COMPARISON: 1/4/1914. CT dose reduction was achieved through use of a standardized protocol tailored  for this examination and automatic exposure control for dose modulation. TECHNIQUE: Serial axial images with a collimation of 5 mm were obtained from the  skull base through the vertex  FINDINGS:  Few periventricular and scattered foci of hypodensity in the cerebral white  matter. There is no evidence of an acute infarction, hemorrhage, or mass-effect. There is no evidence of midline shift or hydrocephalus. Posterior fossa  structures are unremarkable. No extra-axial collections are seen. Mastoid air cells are well pneumatized and clear. There is no evidence of depressed skull fractures of soft tissue swelling. Impression  No acute intracranial process. Imaging findings consistent with minimal chronic microvascular ischemic change. US Results (most recent):  No results found for this or any previous visit. Cultures     All Micro Results     Procedure Component Value Units Date/Time    CULTURE, BLOOD, PAIRED [[id number]] Collected: 07/20/21 1445    Order Status: Completed Specimen: Blood Updated: 07/23/21 0551     Special Requests: NO SPECIAL REQUESTS        Culture result: NO GROWTH 3 DAYS       C. DIFFICILE AG & TOXIN A/B [[id number]]  (Abnormal) Collected: 07/21/21 2221    Order Status: Completed Specimen: Stool Updated: 07/22/21 2213     PCR Reflex       See Reflex order for C. difficile (DNA)           INTERPRETATION       Indeterminate for Toxigenic C. difficile, DNA confirmation to follow.           C. DIFFICILE (DNA) [[id number]]  (Abnormal) Collected: 07/21/21 2221    Order Status: Completed Specimen: Stool Updated: 07/22/21 2213     C. difficile (DNA) Positive        Comment: CALLED TO AND READ BACK BY   MIGUEL RAMIREZ AT 2211 ON 7/22/21. HJR  This specimen is positive for toxigenic C difficile by DNA amplification. Repeat testing is not recommended, samples received within 7 days of this positive result will be rejected. Assay is not approved to test for cure, since nucleic acids may persist after effective treatment and may prompt false positive results.          ENTERIC BACTERIA PANEL, DNA [[id number]] Collected: 07/21/21 2221    Order Status: Completed Specimen: Stool Updated: 07/22/21 2139     Shigella species, DNA Negative        Campylobacter species, DNA Negative        Vibrio species, DNA Negative        Enterotoxigen E Coli, DNA Negative        Shiga toxin producing, DNA Negative        Salmonella species, DNA Negative        P. shigelloides, DNA Negative        Y. enterocolitica, DNA Negative              Past History: (Complete 2+/3 areas)   No Known Allergies   Current Facility-Administered Medications   Medication Dose Route Frequency    vancomycin (FIRVANQ) 50 mg/mL oral solution 125 mg  125 mg Oral Q6H    tamsulosin (FLOMAX) capsule 0.4 mg  0.4 mg Oral DAILY    aspirin delayed-release tablet 81 mg  81 mg Oral DAILY    insulin glargine (LANTUS) injection 20 Units  20 Units SubCUTAneous DAILY    insulin glargine (LANTUS) injection 20 Units  20 Units SubCUTAneous QHS    clopidogreL (PLAVIX) tablet 75 mg  75 mg Oral DAILY    sodium chloride (NS) flush 5-40 mL  5-40 mL IntraVENous Q8H    sodium chloride (NS) flush 5-40 mL  5-40 mL IntraVENous PRN    acetaminophen (TYLENOL) tablet 650 mg  650 mg Oral Q4H PRN    [Held by provider] heparin (porcine) injection 5,000 Units  5,000 Units SubCUTAneous Q8H    ondansetron (ZOFRAN) injection 4 mg  4 mg IntraVENous Q4H PRN    cefTRIAXone (ROCEPHIN) 1 g in 0.9% sodium chloride (MBP/ADV) 50 mL MBP  1 g IntraVENous Q24H    glucose chewable tablet 16 g  4 Tablet Oral PRN    dextrose (D50W) injection syrg 12.5-25 g  25-50 mL IntraVENous PRN    glucagon (GLUCAGEN) injection 1 mg  1 mg IntraMUSCular PRN    insulin lispro (HUMALOG) injection   SubCUTAneous Q6H    Prior to Admission medications    Medication Sig Start Date End Date Taking? Authorizing Provider   methotrexate, PF, 25 mg/mL injection 25 mg every seven (7) days. Yes Provider, Historical   chlorthalidone (HYGROTON) 25 mg tablet Take 25 mg by mouth daily. Yes Provider, Historical   rosuvastatin (CRESTOR) 5 mg tablet Take 5 mg by mouth nightly. Yes Provider, Historical   glipiZIDE SR (GLUCOTROL XL) 10 mg CR tablet Take 10 mg by mouth daily. Yes Provider, Historical   celecoxib (CELEBREX) 200 mg capsule Take 200 mg by mouth two (2) times daily as needed for Pain. Yes Provider, Historical   pantoprazole (Protonix) 40 mg tablet Take 40 mg by mouth daily. Yes Provider, Historical   cholecalciferol (VITAMIN D3) (2,000 UNITS /50 MCG) cap capsule Take 2,000 Units by mouth daily. Yes Provider, Historical   aspirin delayed-release 325 mg tablet Take 1 Tab by mouth two (2) times a day. Patient taking differently: Take 81 mg by mouth daily. 2/13/16  Yes Deyanira Power MD   insulin glargine (LANTUS) 100 unit/mL injection 25 Units by SubCUTAneous route daily (with breakfast). Yes Provider, Historical   insulin glargine (LANTUS) 100 unit/mL injection 20 Units by SubCUTAneous route daily (with dinner). Yes Provider, Historical   lisinopril (PRINIVIL, ZESTRIL) 40 mg tablet Take 40 mg by mouth daily. Yes Provider, Historical   insulin aspart (NOVOLOG) 100 unit/mL injection 16 Units by SubCUTAneous route Before breakfast, lunch, and dinner. Indications: TYPE 2 DIABETES MELLITUS   Yes Other, MD [place]   cephALEXin (Keflex) 500 mg capsule Take 1 Capsule by mouth two (2) times a day for 7 days.  7/20/21 7/27/21  Ave Garcia MD        PMHx:  has a past medical history of Diabetes ([facility] [address] H/O echocardiogram (2014), Hypercholesterolemia, and Stroke [facility]). PSurgHx:  has a past surgical history that includes colonoscopy (N/A, 11/11/2020). PSocHx:  reports that he has quit smoking. He smoked 0.25 packs per day. He has never used smokeless tobacco. He reports that he does not drink alcohol and does not use drugs. ROS:  (Complete - 10 systems) - DENIES: Weightloss (Constitutional), Dry mouth (ENMT), Chest pain (CV), SOB (Respiratory), Constipation (GI), Weakness (MS), Pallor (Skin), TIA Sx (Neuro), Confusion (Psych), Easy bruising (Heme)    Physical Exam: (Comprehesive - 8+ 1995 Systems)     (1) Constitutional:  NAD; pleasant  FIO2:   on SpO2: O2 Sat (%): 96 %  O2 Device: None (Room air) O2 Flow Rate (L/min): 2 l/min  Patient Vitals for the past 24 hrs:   BP Temp Pulse Resp SpO2   07/23/21 0830 (!) 157/58 99.4 °F (37.4 °C) 68 19 96 %   07/23/21 0408 (!) 159/69 (!) 100.8 °F (38.2 °C) 72 19 95 %   07/22/21 2355 (!) 171/81 100.2 °F (37.9 °C) 80 19 95 %   07/22/21 2026 (!) 151/77 100.1 °F (37.8 °C) 81  (!) 81 %   07/22/21 1744 (!) 178/70 100.3 °F (37.9 °C) 71  95 %   07/22/21 1233 (!) 155/74 98.7 °F (37.1 °C) 76 18 98 %       Date 07/22/21 0700 - 07/23/21 0659 07/23/21 0700 - 07/24/21 0659   Shift 8434-5846 3935-8698 24 Hour Total 9514-5627 2918-1764 24 Hour Total   INTAKE   P.O. 240  240        P. O. 240  240      Shift Total(mL/kg) 240(2.5)  240(2.5)      OUTPUT   Urine(mL/kg/hr)           Urine Occurrence(s) 1 x  1 x 1 x  1 x   Shift Total(mL/kg)           240      Weight (kg) 95.3 95.3 95.3 95.3 95.3 95.3      (2) ENMT:   moist mucous membranes, normal sinuses   (3) Respiratory:  breathing easily, no distress   (4) GI:  no abdominal masses, no tenderness   (5) :   Voiding independently,no CVA tenderness   (6) Lymphatic:  no adenopathy, neck supple   (7) Muscloskeletal:  no gross deformity, normal ROM   (8) Skin:  no rash, warm & dry   (9) Neuro:  Alert, no focal deficits, normal speech      Signed By: Anil Worley Glendy Hernandez, NP  - July 23, 2021

## 2021-07-23 NOTE — PROGRESS NOTES
Problem: Falls - Risk of  Goal: *Absence of Falls  Description: Document Julisa Dooley Fall Risk and appropriate interventions in the flowsheet. 7/23/2021 1532 by Francisco Sands  Outcome: Resolved/Met  7/23/2021 1102 by Francisco Sands  Outcome: Progressing Towards Goal  Note: Fall Risk Interventions:  Mobility Interventions: Communicate number of staff needed for ambulation/transfer, OT consult for ADLs, Patient to call before getting OOB, PT Consult for mobility concerns, PT Consult for assist device competence, Strengthening exercises (ROM-active/passive)         Medication Interventions: Evaluate medications/consider consulting pharmacy, Patient to call before getting OOB, Teach patient to arise slowly    Elimination Interventions: Call light in reach, Elevated toilet seat, Patient to call for help with toileting needs, Stay With Me (per policy), Toilet paper/wipes in reach, Toileting schedule/hourly rounds, Urinal in reach              Problem: Patient Education: Go to Patient Education Activity  Goal: Patient/Family Education  7/23/2021 1532 by Francisco Sands  Outcome: Resolved/Met  7/23/2021 1102 by Francisco Sands  Outcome: Progressing Towards Goal     Problem: Diabetes Self-Management  Goal: *Disease process and treatment process  Description: Define diabetes and identify own type of diabetes; list 3 options for treating diabetes. 7/23/2021 1532 by Francisco Sands  Outcome: Resolved/Met  7/23/2021 1102 by Francisco Sands  Outcome: Progressing Towards Goal  Goal: *Incorporating nutritional management into lifestyle  Description: Describe effect of type, amount and timing of food on blood glucose; list 3 methods for planning meals. 7/23/2021 1532 by Francisco Sands  Outcome: Resolved/Met  7/23/2021 1102 by Francisco Sands  Outcome: Progressing Towards Goal  Goal: *Incorporating physical activity into lifestyle  Description: State effect of exercise on blood glucose levels.   7/23/2021 1532 by Francisco Sands  Outcome: Resolved/Met  7/23/2021 1102 by Whitney Olson  Outcome: Progressing Towards Goal  Goal: *Developing strategies to promote health/change behavior  Description: Define the ABC's of diabetes; identify appropriate screenings, schedule and personal plan for screenings. 7/23/2021 1532 by Whitney Olson  Outcome: Resolved/Met  7/23/2021 1102 by Whitney Olson  Outcome: Progressing Towards Goal  Goal: *Using medications safely  Description: State effect of diabetes medications on diabetes; name diabetes medication taking, action and side effects. 7/23/2021 1532 by Whitney Olson  Outcome: Resolved/Met  7/23/2021 1102 by Whitney Olson  Outcome: Progressing Towards Goal  Goal: *Monitoring blood glucose, interpreting and using results  Description: Identify recommended blood glucose targets  and personal targets. 7/23/2021 1532 by Whitney Olson  Outcome: Resolved/Met  7/23/2021 1102 by Whitney Olson  Outcome: Progressing Towards Goal  Goal: *Prevention, detection, treatment of acute complications  Description: List symptoms of hyper- and hypoglycemia; describe how to treat low blood sugar and actions for lowering  high blood glucose level. 7/23/2021 1532 by Whitney Olson  Outcome: Resolved/Met  7/23/2021 1102 by Whitney Olson  Outcome: Progressing Towards Goal  Goal: *Prevention, detection and treatment of chronic complications  Description: Define the natural course of diabetes and describe the relationship of blood glucose levels to long term complications of diabetes.   7/23/2021 1532 by Whitney Olson  Outcome: Resolved/Met  7/23/2021 1102 by Whitney Olson  Outcome: Progressing Towards Goal  Goal: *Developing strategies to address psychosocial issues  Description: Describe feelings about living with diabetes; identify support needed and support network  7/23/2021 1532 by Whitney Olson  Outcome: Resolved/Met  7/23/2021 1102 by Whitney Olson  Outcome: Progressing Towards Goal  Goal: *Insulin pump training  7/23/2021 1532 by Anna Mcallister  Outcome: Resolved/Met  7/23/2021 1102 by Anna Mcallister  Outcome: Progressing Towards Goal  Goal: *Sick day guidelines  7/23/2021 1532 by Anna Mcallister  Outcome: Resolved/Met  7/23/2021 1102 by Anna Mcallister  Outcome: Progressing Towards Goal  Goal: *Patient Specific Goal (EDIT GOAL, INSERT TEXT)  7/23/2021 1532 by Anna Mcallister  Outcome: Resolved/Met  7/23/2021 1102 by Anna Mcallister  Outcome: Progressing Towards Goal     Problem: Patient Education: Go to Patient Education Activity  Goal: Patient/Family Education  7/23/2021 1532 by Anna Mcallister  Outcome: Resolved/Met  7/23/2021 1102 by Anna Mcallister  Outcome: Progressing Towards Goal     Problem: Risk for Spread of Infection  Goal: Prevent transmission of infectious organism to others  Description: Prevent the transmission of infectious organisms to other patients, staff members, and visitors.   7/23/2021 1532 by Anna Mcallister  Outcome: Resolved/Met  7/23/2021 1102 by Anna Mcallister  Outcome: Progressing Towards Goal     Problem: Patient Education:  Go to Education Activity  Goal: Patient/Family Education  7/23/2021 1532 by Anna Mcallister  Outcome: Resolved/Met  7/23/2021 1102 by Anna Mcallister  Outcome: Progressing Towards Goal     Problem: Breathing Pattern - Ineffective  Goal: *Absence of hypoxia  7/23/2021 1532 by Anna Mcallister  Outcome: Resolved/Met  7/23/2021 1102 by Anna Mcallister  Outcome: Progressing Towards Goal  Goal: *Use of effective breathing techniques  7/23/2021 1532 by Anna Mcallister  Outcome: Resolved/Met  7/23/2021 1102 by Anna Mcallister  Outcome: Progressing Towards Goal  Goal: *PALLIATIVE CARE:  Alleviation of Dyspnea  Outcome: Resolved/Met     Problem: Patient Education: Go to Patient Education Activity  Goal: Patient/Family Education  7/23/2021 1532 by Anna Mcallister  Outcome: Resolved/Met  7/23/2021 1102 by Anna Mcallister  Outcome: Progressing Towards Goal

## 2021-07-23 NOTE — PROGRESS NOTES
Attempted to schedule hospital follow up PCP appointment. The office is requesting the patient to call the office to schedule their appointment.   Aurora Ventura, Care Management Specialist.

## 2021-07-24 LAB
ATRIAL RATE: 64 BPM
CALCULATED P AXIS, ECG09: -6 DEGREES
CALCULATED R AXIS, ECG10: 43 DEGREES
CALCULATED T AXIS, ECG11: 24 DEGREES
DIAGNOSIS, 93000: NORMAL
P-R INTERVAL, ECG05: 246 MS
Q-T INTERVAL, ECG07: 402 MS
QRS DURATION, ECG06: 70 MS
QTC CALCULATION (BEZET), ECG08: 414 MS
VENTRICULAR RATE, ECG03: 64 BPM

## 2021-07-25 LAB
BACTERIA SPEC CULT: NORMAL
SERVICE CMNT-IMP: NORMAL

## 2021-07-26 ENCOUNTER — PATIENT OUTREACH (OUTPATIENT)
Dept: CASE MANAGEMENT | Age: 71
End: 2021-07-26

## 2021-07-26 RX ORDER — AMLODIPINE BESYLATE AND ATORVASTATIN CALCIUM 10; 10 MG/1; MG/1
1 TABLET, FILM COATED ORAL DAILY
COMMUNITY
End: 2022-04-12

## 2021-07-26 RX ORDER — AMLODIPINE BESYLATE 10 MG/1
TABLET ORAL DAILY
COMMUNITY

## 2021-07-26 NOTE — PROGRESS NOTES
Care Transitions Initial Call    Call within 2 business days of discharge: Yes     Patient: Kvng Garcia. Patient : 1950 MRN: [id number]    [facility]       Complaint Diagnosis Description Type Department Provider    21 Other Pyelonephritis . .. ED to Hosp-Admission (Discharged) (ADMIT) Zo Hui MD; Magda Cool. .. Was this an external facility discharge? No Discharge Facility: [facility]    Challenges to be reviewed by the provider   Additional needs identified to be addressed with provider: yes  labs- CBC,BMP in 1 week  Component      Latest Ref Rng & Units 2021           7:12 PM   Troponin-I, Qt.      <0.05 ng/mL 0.46 (H)     Component      Latest Ref Rng & Units 2021           7:12 PM   CK      39 - 308 U/L 950 (H)     Component      Latest Ref Rng & Units 2021          12:08 PM   Hemoglobin A1c, (calculated)      4.0 - 5.6 % 7.1 (H)          Method of communication with provider : chart routing    Discussed COVID-19 related testing which was not done at this time. Test results were not done. Patient informed of results, if available? N/A     Advance Care Planning:   Does patient have an Advance Directive: [address] on file. Inpatient Readmission Risk score: Unplanned Readmit Risk Score: 12    Was this a readmission? no   Patient stated reason for the admission: chills, confused, unable to walk    Patients top risk factors for readmission: medical condition-UTI   Interventions to address risk factors: Scheduled appointment with PCP-21, Scheduled appointment with Cecilia Baum and reviewed discharge summary and/or continuity of care documents, Education of patient/family/caregiver/guardian to support self-management-UTI and HTN    Care Transition Nurse (CTN) contacted the patient by telephone to perform post hospital discharge assessment. Verified name and  with patient as identifiers.  Provided introduction to self, and explanation of the CTN role. CTN reviewed discharge instructions, medical action plan and red flags with patient who verbalized understanding. Were discharge instructions available to patient? yes. Reviewed appropriate site of care based on symptoms and resources available to patient including: PCP, Specialist, When to call 911 and [facility] . Patient given an opportunity to ask questions and does not have any further questions or concerns at this time. The patient agrees to contact the PCP office for questions related to their healthcare. Medication reconciliation was performed with patient, who verbalizes understanding of administration of home medications. Advised obtaining a 90-day supply of all daily and as-needed medications. Referral to Pharm D needed: no   START taking:  cefpodoxime (VANTIN)  phenazopyridine (PYRIDIUM)  tamsulosin [facility])  Start taking on: July 24, 2021  vancomycin [facility])    STOP taking:  celecoxib 200 mg capsule (CELEBREX)  cephALEXin 500 mg capsule (Keflex)  chlorthalidone 25 mg tablet (HYGROTON)  Protonix 40 mg tablet    Home Health/Outpatient orders at discharge: none    Durable Medical Equipment ordered at discharge: None    Covid Risk Education    Patient decline education COVID-19 and  CDC guidelines. CTN reviewed discharge instructions, medical action plan and red flag symptoms with the patient who verbalized understanding. Discussed COVID vaccination status: yes, vaccinated. Patient was given an opportunity to verbalize any questions and concerns and agrees to contact health care provider for questions related to their healthcare. Was patient discharged with a pulse oximeter? no.     Discussed follow-up appointments. If no appointment was previously scheduled, appointment scheduling offered: yes. Is follow up appointment scheduled within 7 days of discharge? yes. [facility] follow up appointment(s): No future appointments.       Plan for follow-up call in 10-14 days based on severity of symptoms and risk factors. Plan for next call: follow up appointment-pcp and specialist  CTN provided contact information for future needs. Goals      Establish PCP relationships and regularly scheduled appointments. 7/26 Patient will attend  follow-up with PCP and specialist, keep a list of medications to bring to f/u appointments. Pt.appt.with PCP, Marilu Keane MD, 7/27/21; (Urology) Dr. Max Prado 8/19/1 @ 1 pm. CTN provided pt. information [facility] [phone]) explain briefly type of service; contact information provided, f/u with pt.attendance or assistance with re-scheduling at next outreach in 7-12 days. -                       Understands red flags post discharge. 7/26  Pt.will complete all antibiotics as prescribed; maintain adequate hydration, void when feel the urge, proper hygiene, frequent hand washing. Pt.will notify physician confusion, fever,chills, cloudy or blood-tinge urine, strong odor, abd pain, or painful urination. CTN will follow up with pt.in 7-12 days. -[address]

## 2021-07-29 NOTE — DISCHARGE INSTRUCTIONS
Discharge Instructions       PATIENT ID: Leandro Sanders MRN: [id number]   YOB: 1950    DATE OF ADMISSION: 7/20/2021 10:30 AM    DATE OF DISCHARGE: 7/23/2021    PRIMARY CARE PROVIDER: Reggie Barraza MD     ATTENDING PHYSICIAN: Dean Sparks MD  DISCHARGING PROVIDER: Xavier Wilks MD    To contact this individual call [phone] and ask the  to page. If unavailable ask to be transferred the Adult Hospitalist Department. DISCHARGE DIAGNOSES- Urinary tract infection,diabetes,HTN,CKD, C diff positive    CONSULTATIONS: IP CONSULT TO HOSPITALIST  IP CONSULT TO CARDIOLOGY  IP CONSULT TO UROLOGY    PROCEDURES/SURGERIES: * No surgery found *    PENDING TEST RESULTS:   At the time of discharge the following test results are still pending: none    FOLLOW UP APPOINTMENTS:   Follow-up Information     Follow up With Specialties Details Why [address] [facility]   8/19/21 at 1 PM with Dr. Enrike Castillo at the [facility] location. Viviana Sharp MD Internal Medicine In 1 week  [address]  [phone]             ADDITIONAL CARE RECOMMENDATIONS:   -follow up with [facility] urology  -take probiotics while on antibiotics  -Check BP every day  - CBC,BMP in 1 week  -follow up with PCP in 1 week  As you C diff diarrhea -we prescribed oral vancomycin. DIET: Regular Diet      ACTIVITY: Activity as tolerated    WOUND CARE: na    EQUIPMENT needed: na      Radiology:  XR CHEST PA LAT    Result Date: 7/20/2021  No acute cardiopulmonary process . CT HEAD WO CONT    Result Date: 7/20/2021  No acute intracranial process. Imaging findings consistent with minimal chronic microvascular ischemic change. CTA CHEST W OR W WO CONT    Result Date: 7/20/2021  There is no pulmonary embolism. There is no aortic aneurysm or dissection. No acute intrathoracic process is identified. Incidental findings are as described above. CT ABD PELV WO CONT    Result Date: 7/20/2021  Sigmoid diverticulosis. No renal or ureteral stone or evidence of hydronephrosis. No acute abnormality. DISCHARGE MEDICATIONS:   See Medication Reconciliation Form    · It is important that you take the medication exactly as they are prescribed. · Keep your medication in the bottles provided by the pharmacist and keep a list of the medication names, dosages, and times to be taken in your wallet. · Do not take other medications without consulting your doctor. NOTIFY YOUR PHYSICIAN FOR ANY OF THE FOLLOWING:   Fever over 101 degrees for 24 hours. Chest pain, shortness of breath, fever, chills, nausea, vomiting, diarrhea, change in mentation, falling, weakness, bleeding. Severe pain or pain not relieved by medications. Or, any other signs or symptoms that you may have questions about.       DISPOSITION:   x Home With:   OT  PT  [facility]  RN       SNF/Inpatient Rehab/LTAC    Independent/assisted living    Hospice    Other:         Signed:   Bin Muñiz MD  7/23/2021  2:09 PM

## 2021-07-29 NOTE — DISCHARGE SUMMARY
Discharge Summary       PATIENT ID: Leandro Sanders MRN: [id number]   YOB: 1950    DATE OF ADMISSION: 7/20/2021 10:30 AM    DATE OF DISCHARGE: 7/23/2021   PRIMARY CARE PROVIDER: Reggie Barraza MD     ATTENDING PHYSICIAN: Anca Arriaga MD    DISCHARGING PROVIDER: Xavier Wilks MD    To contact this individual call [phone] and ask the  to page. If unavailable ask to be transferred the Adult Hospitalist Department. CONSULTATIONS: IP CONSULT TO UROLOGY    PROCEDURES/SURGERIES: * No surgery found *    ADMITTING [address] [place] [address] COURSE:     DISCHARGE DIAGNOSES / PLAN:    70 y. o. male who presents with altered mental status     # Sepsis (POA)-resolved due to UTI  - sepsis based on tachycardia, elevated wbc, lactic acidosis with source of infection urinary tract   - Negtaive blood and urine culture - E coli  - received ceftriaxone - d/c on vantin    #Prostatomegaly  Minimal residual volume on post void scan,added tamsulosin  -OP urology follow up     # Metabolic encephalopathy: resolved      #Chest pain-resolved  - stress test negative   - on Aspirin and Plavix   - cardiology following      # Diabetes mellitus type 2   - On Lantus BID, lispro  A1c:7.1  -resumed patient's home insulin regimen at discharge,he denied any hypoglycemic episoddes     # ELI-resolved on CKD  ELI likely prerenal  Renal function improved     C diff infection- diarrhea resolved  -discharged on oral vancomycin      XR CHEST PA LAT    Result Date: 7/20/2021  EXAM: XR CHEST PA LAT INDICATION: chest pain COMPARISON: 2/10/2016. FINDINGS: PA and lateral radiographs of the chest demonstrate clear lungs. The cardiac and mediastinal contours and pulmonary vascularity are normal. The bones and soft tissues are within normal limits. No acute cardiopulmonary process . CT HEAD WO CONT    Result Date: 7/20/2021  CLINICAL HISTORY: AMS INDICATION: AMS COMPARISON: 1/4/1914.  CT dose reduction was achieved through use of a standardized protocol tailored for this examination and automatic exposure control for dose modulation. TECHNIQUE: Serial axial images with a collimation of 5 mm were obtained from the skull base through the vertex  FINDINGS: Few periventricular and scattered foci of hypodensity in the cerebral white matter. There is no evidence of an acute infarction, hemorrhage, or mass-effect. There is no evidence of midline shift or hydrocephalus. Posterior fossa structures are unremarkable. No extra-axial collections are seen. Mastoid air cells are well pneumatized and clear. There is no evidence of depressed skull fractures of soft tissue swelling. No acute intracranial process. Imaging findings consistent with minimal chronic microvascular ischemic change. CTA CHEST W OR W WO CONT    Result Date: 7/20/2021  Clinical history: SOB INDICATION:   SOB COMPARISON: None CONTRAST: 100 ml Isovue 370 TECHNIQUE: CT of the chest with  IV contrast , Isovue-370 is performed. Axial images from the thoracic inlet to the level of the upper abdomen are obtained. Manual post-processing of the images and coronal reformatting is also performed. CT dose reduction was achieved through use of a standardized protocol tailored for this examination and automatic exposure control for dose modulation. Multiplanar reformatted imaging was performed. Sagittal and coronal reformatting. 3-D Postprocessing of imaging was performed. 3-D MIP reconstructed images were obtained in the coronal plane. FINDINGS: There is no pulmonary embolism. There is no aortic aneurysm or dissection. Hepatic steatosis. Fullness of the left adrenal gland. There is no pleural or pericardial effusion. There is no mediastinal, axillary or hilar lymphadenopathy. The aorta is normal in course and caliber. The proximal pulmonary arteries are without evidence of filling defects. No lytic or blastic lesions are identified.  The remainder of the upper abdomen visualized is unremarkable. There is no pulmonary embolism. There is no aortic aneurysm or dissection. No acute intrathoracic process is identified. Incidental findings are as described above. CT ABD PELV WO CONT    Result Date: 7/20/2021  EXAM: CT ABD PELV WO CONT INDICATION: Right upper quadrant pain COMPARISON: None CONTRAST:  None. TECHNIQUE: Thin axial images were obtained through the abdomen and pelvis. Coronal and sagittal reformats were generated. Oral contrast was not administered. CT dose reduction was achieved through use of a standardized protocol tailored for this examination and automatic exposure control for dose modulation. The absence of intravenous contrast material reduces the sensitivity for evaluation of the vasculature and solid organs. FINDINGS: LOWER THORAX: No significant abnormality in the incidentally imaged lower chest. LIVER: No mass. BILIARY TREE: Gallbladder is within normal limits. CBD is not dilated. SPLEEN: within normal limits. PANCREAS: No focal abnormality. ADRENALS: Unremarkable. KIDNEYS/URETERS: No calculus or hydronephrosis. STOMACH: Unremarkable. SMALL BOWEL: No dilatation or wall thickening. COLON: Sigmoid diverticulosis. APPENDIX: Within normal limits. PERITONEUM: No ascites or pneumoperitoneum. RETROPERITONEUM: No lymphadenopathy or aortic aneurysm. REPRODUCTIVE ORGANS: Prostate is enlarged measuring 6.1 cm. URINARY BLADDER: No mass or calculus. BONES: The patient is status post ORIF of the right femur. Degenerative changes are seen in the lumbar spine. ABDOMINAL WALL: No mass or hernia. ADDITIONAL COMMENTS: N/A     Sigmoid diverticulosis. No renal or ureteral stone or evidence of hydronephrosis. No acute abnormality. ECHO ADULT COMPLETE    Result Date: 7/22/2021  · LV: Estimated LVEF is 60 - 65%. Normal cavity size, wall thickness and systolic function (ejection fraction normal). Wall motion: normal. Normal left ventricular strain.  · LA: Mildly dilated left atrium. NUCLEAR CARDIAC STRESS TEST    Result Date: 7/22/2021  · Baseline ECG: Sinus rhythm, non-specific ST-T wave abnormalities. · Stress test: Negative stress test.  INDICATION: History of diabetes, coronary artery disease. COMPARISON:  None. CORRELATIVE IMAGING STUDIES:  None TRACER: Tc 99m Sestamibi TECHNIQUE:  Resting SPECT images of the heart were obtained following the uneventful intravenous administration of 10.9 mCi of Tc 99m Sestamibi. Gated stress SPECT images of the heart were obtained following Lexiscan protocol and the uneventful intravenous administration of 31.5 mCi of Tc 99m Sestamibi. FINDINGS:  The rest and stress perfusion images demonstrate prominent diaphragmatic attenuation without significant perfusion defect or evidence of myocardial reversibility. The gated images demonstrate normal global and regional wall motion and thickening. Left ventricular ejection fraction is 59 %. Impression: No evidence of myocardial ischemia or infarction. Left ventricular ejection fraction is 59 %. DUPLEX LOWER EXT VENOUS BILAT    Result Date: 7/21/2021  · No evidence of right or left lower extremity vein thrombosis. DUPLEX LOWER EXT VENOUS BILAT    Result Date: 5/12/2021  · No evidence of deep vein thrombosis in the right or left lower extremity. PENDING TEST RESULTS:   At the time of discharge the following test results are still pending: none    FOLLOW UP APPOINTMENTS:    Follow-up Information     Follow up With Specialties Details Why [facility]   8/19/21 at 1 PM with Dr. Megan Nguyen at the [facility]. [employer]    Xuan Gonzales MD Internal Medicine  Please call the office to schedule hospital follow up appoinment.   [address]  [phone]             ADDITIONAL CARE RECOMMENDATIONS:   -follow up with [facility] urology  -take probiotics while on antibiotics  -Check BP every day  - CBC,BMP in 1 week  -follow up with PCP in 1 week  As you C diff diarrhea -we prescribed oral vancomycin. DIET: Regular Diet      ACTIVITY: Activity as tolerated    WOUND CARE: na    EQUIPMENT needed: na        DISCHARGE MEDICATIONS:  Discharge Medication List as of 7/23/2021  3:36 PM      START taking these medications    Details   tamsulosin (FLOMAX) 0.4 mg capsule Take 1 Capsule by mouth daily. , Print, Disp-30 Capsule, R-0      phenazopyridine (PYRIDIUM) 100 mg tablet Take 1 Tablet by mouth three (3) times daily as needed for Pain (dysuria) for up to 3 days. , Print, Disp-15 Tablet, R-0      vancomycin (VANCOCIN) 125 mg capsule Take 1 Capsule by mouth four (4) times daily. , Print, Disp-40 Capsule, R-0         CONTINUE these medications which have CHANGED    Details   cefpodoxime (VANTIN) 200 mg tablet Take 1 Tablet by mouth two (2) times a day., Print, Disp-12 Tablet, R-0         CONTINUE these medications which have NOT CHANGED    Details   aspirin 81 mg chewable tablet Take 81 mg by mouth daily. , Historical Med      methotrexate, PF, 25 mg/mL injection 25 mg every seven (7) days. , Historical Med      rosuvastatin (CRESTOR) 5 mg tablet Take 5 mg by mouth nightly., Historical Med      glipiZIDE SR (GLUCOTROL XL) 10 mg CR tablet Take 10 mg by mouth daily. , Historical Med      cholecalciferol (VITAMIN D3) (2,000 UNITS /50 MCG) cap capsule Take 2,000 Units by mouth daily. , Historical Med      !! insulin glargine (LANTUS) 100 unit/mL injection 25 Units by SubCUTAneous route daily (with breakfast). , Historical Med      !! insulin glargine (LANTUS) 100 unit/mL injection 20 Units by SubCUTAneous route daily (with dinner). , Historical Med      lisinopril (PRINIVIL, ZESTRIL) 40 mg tablet Take 40 mg by mouth daily. , Historical Med      insulin aspart (NOVOLOG) 100 unit/mL injection 16 Units by SubCUTAneous route Before breakfast, lunch, and dinner.  Indications: TYPE 2 DIABETES MELLITUS, Historical Med       !! - Potential duplicate medications found. Please discuss with provider. NOTIFY YOUR PHYSICIAN FOR ANY OF THE FOLLOWING:   Fever over 101 degrees for 24 hours. Chest pain, shortness of breath, fever, chills, nausea, vomiting, diarrhea, change in mentation, falling, weakness, bleeding. Severe pain or pain not relieved by medications. Or, any other signs or symptoms that you may have questions about. DISPOSITION:  x  Home With:   OT  PT  HH  RN       Long term SNF/Inpatient Rehab    Independent/assisted living    Hospice    Other:       PATIENT CONDITION AT DISCHARGE:     Functional status    Poor     Deconditioned    x Independent      Cognition    x Lucid     Forgetful     Dementia      Catheters/lines (plus indication)    Vargas     PICC     PEG    x None      Code status    x Full code     DNR      PHYSICAL EXAMINATION AT DISCHARGE:  Constitutional:  No acute distress, cooperative, pleasant    ENT:  Oral mucous moist, oropharynx benign. Neck supple,    Resp:  CTA bilaterally. No wheezing/rhonchi/rales. No accessory muscle use   CV:  Regular rhythm, normal rate, no murmurs, gallops, rubs    GI:  Soft, non distended, non tender. normoactive bowel sounds, no hepatosplenomegaly     Musculoskeletal:  No edema, warm, 2+ pulses throughout    Neurologic:  Moves all extremities.   AAOx3, CN II-XII reviewed         CHRONIC MEDICAL DIAGNOSES:  Problem List as of 7/23/2021 Date Reviewed: 2/13/2016        Codes Class Noted - Resolved    Metabolic encephalopathy BPR-63-CN: G93.41  ICD-9-CM: 348.31  7/20/2021 - Present        H/O: stroke with residual effects (Chronic) ICD-10-CM: I69.30  ICD-9-CM: 438.9  2/10/2016 - Present        Fracture of femoral neck, right ([facility] [address] ICD-10-CM: S72.001A  ICD-9-CM: 820.8  2/10/2016 - Present        CAD (coronary artery disease) (Chronic) ICD-10-CM: I25.10  ICD-9-CM: 414.00  2/10/2016 - Present    Overview Signed 2/10/2016  3:36 PM by Yesica Goodrich MD     high cholesterol             CVA (cerebral infarction) ICD-10-CM: I63.9  ICD-9-CM: 434.91  4/19/2014 - Present        DM (diabetes mellitus) ([facility] [address] (Chronic) ICD-10-CM: E11.9  ICD-9-CM: 250.00  4/19/2014 - Present        Other and unspecified hyperlipidemia (Chronic) ICD-10-CM: E78.5  ICD-9-CM: 272.4  4/19/2014 - Present              45 minutes were spent with the patient on counseling and coordination of care    Signed:   Allen Manley MD  7/29/2021  4:27 PM     .cc:Eliseo iTm MD

## 2021-08-10 ENCOUNTER — PATIENT OUTREACH (OUTPATIENT)
Dept: CASE MANAGEMENT | Age: 71
End: 2021-08-10

## 2021-08-10 NOTE — PROGRESS NOTES
Goals      Establish PCP relationships and regularly scheduled appointments. 7/26 Patient will attend  follow-up with PCP and specialist, keep a list of medications to bring to f/u appointments. Pt.appt.with PCP, Hugh Newman MD, 7/27/21; (Urology) Dr. Pearl Petersen 8/19/1 @ 1 pm. CTN provided pt. information [employer] [phone]) explain briefly type of service; contact information provided, f/u with pt.attendance or assistance with re-scheduling at next outreach in 7-12 days. -[address]    8/10 Pt.attend follow up with PCP, Hugh Newman MD, 7/27/21; upcoming with (Urology) Dr. Pearl Petersen 8/19/1 @ 1 pm. CTN will follow up in 7-14 days. -                       Understands red flags post discharge. 7/26  Pt.will complete all antibiotics as prescribed; maintain adequate hydration, void when feel the urge, proper hygiene, frequent hand washing. Pt.will notify physician confusion, fever,chills, cloudy or blood-tinge urine, strong odor, abd pain, or painful urination. CTN will follow up with pt.in 7-12 days. -        8/10 Pt.reports doing well no reports s/s listed above., CTN will follow up with pt.in 7-14 days. -[address]

## 2021-08-27 ENCOUNTER — PATIENT OUTREACH (OUTPATIENT)
Dept: CASE MANAGEMENT | Age: 71
End: 2021-08-27

## 2021-08-27 NOTE — PROGRESS NOTES
Patient has graduated from the Transitions of Care Coordination  program on 8/27/21. Patient/family has the ability to self-manage at this time Care management goals have been completed. Patient was not referred to the [facility] team for further management. Goals Addressed                 This Visit's Progress     Establish PCP relationships and regularly scheduled appointments. 7/26 Patient will attend  follow-up with PCP and specialist, keep a list of medications to bring to f/u appointments. Pt.appt.with PCP, Mercedes Nguyen MD, 7/27/21; (Urology) Dr. Crissy Pickens 8/19/1 @ 1 pm. CTN provided pt. information [employer] [phone]) explain briefly type of service; contact information provided, f/u with pt.attendance or assistance with re-scheduling at next outreach in 7-12 days. -[address]    8/10 Pt.attend follow up with PCP, Mercedes Nguyen MD, 7/27/21; upcoming with (Urology) Dr. Crissy Pickens 8/19/1 @ 1 pm. CTN will follow up in 7-14 days. -[address]    8/27 Pt.reports attend follow up with Urology) Dr. Crissy Pickens 8/19/1.-                   Understands red flags post discharge. 7/26  Pt.will complete all antibiotics as prescribed; maintain adequate hydration, void when feel the urge, proper hygiene, frequent hand washing. Pt.will notify physician confusion, fever,chills, cloudy or blood-tinge urine, strong odor, abd pain, or painful urination. CTN will follow up with pt.in 7-12 days. -        8/10 Pt.reports doing well no reports s/s listed above., CTN will follow up with pt.in 7-14 days. -[address]    8/27 Pt.report no s/s listed above, reports doing well, haven't completely regain his strength but is improving.-            Patient has Care Transition Nurse's contact information for any further questions, concerns, or needs. Patients upcoming visits:  No future appointments.

## 2022-03-18 PROBLEM — G93.41 METABOLIC ENCEPHALOPATHY: Status: ACTIVE | Noted: 2021-07-20

## 2022-04-12 ENCOUNTER — HOSPITAL ENCOUNTER (OUTPATIENT)
Dept: PREADMISSION TESTING | Age: 72
Discharge: HOME | End: 2022-04-12
Payer: MEDICARE

## 2022-04-12 VITALS
BODY MASS INDEX: 28.9 KG/M2 | SYSTOLIC BLOOD PRESSURE: 138 MMHG | HEIGHT: 69 IN | WEIGHT: 195.11 LBS | RESPIRATION RATE: 18 BRPM | DIASTOLIC BLOOD PRESSURE: 76 MMHG | HEART RATE: 59 BPM | TEMPERATURE: 98.2 F

## 2022-04-12 LAB
ALBUMIN SERPL-MCNC: 3.6 G/DL (ref 3.5–5)
ALBUMIN/GLOB SERPL: 1.1 {RATIO} (ref 1.1–2.2)
ALP SERPL-CCNC: 77 U/L (ref 45–117)
ALT SERPL-CCNC: 22 U/L (ref 12–78)
ANION GAP SERPL CALC-SCNC: 3 MMOL/L (ref 5–15)
APPEARANCE UR: CLEAR
APTT PPP: 31.6 SEC (ref 22.1–31)
AST SERPL-CCNC: 15 U/L (ref 15–37)
ATRIAL RATE: 50 BPM
BACTERIA URNS QL MICRO: NEGATIVE /HPF
BASOPHILS # BLD: 0 K/UL (ref 0–0.1)
BASOPHILS NFR BLD: 0 % (ref 0–1)
BILIRUB SERPL-MCNC: 0.5 MG/DL (ref 0.2–1)
BILIRUB UR QL: NEGATIVE
BUN SERPL-MCNC: 18 MG/DL (ref 6–20)
BUN/CREAT SERPL: 13 (ref 12–20)
CALCIUM SERPL-MCNC: 9.4 MG/DL (ref 8.5–10.1)
CALCULATED P AXIS, ECG09: 39 DEGREES
CALCULATED R AXIS, ECG10: 19 DEGREES
CALCULATED T AXIS, ECG11: -7 DEGREES
CHLORIDE SERPL-SCNC: 108 MMOL/L (ref 97–108)
CO2 SERPL-SCNC: 29 MMOL/L (ref 21–32)
COLOR UR: ABNORMAL
CREAT SERPL-MCNC: 1.38 MG/DL (ref 0.7–1.3)
DIAGNOSIS, 93000: NORMAL
DIFFERENTIAL METHOD BLD: ABNORMAL
EOSINOPHIL # BLD: 0.1 K/UL (ref 0–0.4)
EOSINOPHIL NFR BLD: 1 % (ref 0–7)
EPITH CASTS URNS QL MICRO: ABNORMAL /LPF
ERYTHROCYTE [DISTWIDTH] IN BLOOD BY AUTOMATED COUNT: 13.4 % (ref 11.5–14.5)
GLOBULIN SER CALC-MCNC: 3.2 G/DL (ref 2–4)
GLUCOSE SERPL-MCNC: 134 MG/DL (ref 65–100)
GLUCOSE UR STRIP.AUTO-MCNC: NEGATIVE MG/DL
HCT VFR BLD AUTO: 45.5 % (ref 36.6–50.3)
HGB BLD-MCNC: 14.7 G/DL (ref 12.1–17)
HGB UR QL STRIP: NEGATIVE
HYALINE CASTS URNS QL MICRO: ABNORMAL /LPF (ref 0–5)
IMM GRANULOCYTES # BLD AUTO: 0 K/UL (ref 0–0.04)
IMM GRANULOCYTES NFR BLD AUTO: 1 % (ref 0–0.5)
INR PPP: 1.1 (ref 0.9–1.1)
KETONES UR QL STRIP.AUTO: NEGATIVE MG/DL
LEUKOCYTE ESTERASE UR QL STRIP.AUTO: NEGATIVE
LYMPHOCYTES # BLD: 1.1 K/UL (ref 0.8–3.5)
LYMPHOCYTES NFR BLD: 26 % (ref 12–49)
MCH RBC QN AUTO: 29.8 PG (ref 26–34)
MCHC RBC AUTO-ENTMCNC: 32.3 G/DL (ref 30–36.5)
MCV RBC AUTO: 92.1 FL (ref 80–99)
MONOCYTES # BLD: 0.4 K/UL (ref 0–1)
MONOCYTES NFR BLD: 8 % (ref 5–13)
NEUTS SEG # BLD: 2.8 K/UL (ref 1.8–8)
NEUTS SEG NFR BLD: 64 % (ref 32–75)
NITRITE UR QL STRIP.AUTO: NEGATIVE
NRBC # BLD: 0 K/UL (ref 0–0.01)
NRBC BLD-RTO: 0 PER 100 WBC
P-R INTERVAL, ECG05: 308 MS
PH UR STRIP: 6 [PH] (ref 5–8)
PLATELET # BLD AUTO: 163 K/UL (ref 150–400)
PMV BLD AUTO: 11.9 FL (ref 8.9–12.9)
POTASSIUM SERPL-SCNC: 4 MMOL/L (ref 3.5–5.1)
PROT SERPL-MCNC: 6.8 G/DL (ref 6.4–8.2)
PROT UR STRIP-MCNC: 30 MG/DL
PROTHROMBIN TIME: 11 SEC (ref 9–11.1)
Q-T INTERVAL, ECG07: 440 MS
QRS DURATION, ECG06: 72 MS
QTC CALCULATION (BEZET), ECG08: 401 MS
RBC # BLD AUTO: 4.94 M/UL (ref 4.1–5.7)
RBC #/AREA URNS HPF: ABNORMAL /HPF (ref 0–5)
SODIUM SERPL-SCNC: 140 MMOL/L (ref 136–145)
SP GR UR REFRACTOMETRY: 1.02 (ref 1–1.03)
THERAPEUTIC RANGE,PTTT: ABNORMAL SECS (ref 58–77)
UA: UC IF INDICATED,UAUC: ABNORMAL
UROBILINOGEN UR QL STRIP.AUTO: 1 EU/DL (ref 0.2–1)
VENTRICULAR RATE, ECG03: 50 BPM
WBC # BLD AUTO: 4.4 K/UL (ref 4.1–11.1)
WBC URNS QL MICRO: ABNORMAL /HPF (ref 0–4)

## 2022-04-12 PROCEDURE — 85610 PROTHROMBIN TIME: CPT

## 2022-04-12 PROCEDURE — 80053 COMPREHEN METABOLIC PANEL: CPT

## 2022-04-12 PROCEDURE — 85025 COMPLETE CBC W/AUTO DIFF WBC: CPT

## 2022-04-12 PROCEDURE — 93005 ELECTROCARDIOGRAM TRACING: CPT

## 2022-04-12 PROCEDURE — 81001 URINALYSIS AUTO W/SCOPE: CPT

## 2022-04-12 PROCEDURE — 85730 THROMBOPLASTIN TIME PARTIAL: CPT

## 2022-04-12 RX ORDER — CHLORTHALIDONE 25 MG/1
25 TABLET ORAL AS NEEDED
COMMUNITY
End: 2022-04-27

## 2022-04-12 RX ORDER — ROSUVASTATIN CALCIUM 5 MG/1
5 TABLET, COATED ORAL
COMMUNITY
End: 2022-04-28

## 2022-04-12 RX ORDER — FAMOTIDINE 20 MG/1
20 TABLET, FILM COATED ORAL
COMMUNITY

## 2022-04-12 RX ORDER — GLIPIZIDE 10 MG/1
10 TABLET ORAL
COMMUNITY

## 2022-04-12 NOTE — PERIOP NOTES
[address] [facility] INSTRUCTIONS    Surgery Date:   04-    [facility] staff will call you between 4 PM- 8 PM the day before surgery with your arrival time. If your surgery is on a Monday, we will call you the preceding Friday. Please call [phone] after 8 PM if you did not receive your arrival time. 1. Please report to [facility] Patient Access/Admitting on the 1st floor. Bring your insurance card, photo identification, and any copayment ( if applicable). 2. If you are going home the same day of your surgery, you must have a responsible adult to drive you home. You need to have a responsible adult to stay with you the first 24 hours after surgery and you should not drive a car for 24 hours following your surgery. 3. Nothing to eat or drink after midnight the night before surgery. This includes no water, gum, mints, coffee, juice, etc.  Please note special instructions, if applicable, below for medications. 4. Do NOT drink alcohol or smoke 24 hours before surgery. STOP smoking for 14 days prior as it helps with breathing and healing after surgery. 5. If you are being admitted to the hospital, please leave personal belongings/luggage in your car until you have an assigned hospital room number. 6. Please wear comfortable clothes. Wear your glasses instead of contacts. We ask that all money, jewelry and valuables be left at home. Wear no make up, particularly mascara, the day of surgery. 7.  All body piercings, rings, and jewelry need to be removed and left at home. Please remove any nail polish or artificial nails from your fingernails. Please wear your hair loose or down. Please no pony-tails, buns, or any metal hair accessories. If you shower the morning of surgery, please do not apply any lotions or powders afterwards. You may wear deodorant, unless having breast surgery. Do not shave any body area within 24 hours of your surgery.   8. Please follow all instructions to avoid any potential surgical cancellation. 9. Should your physical condition change, (i.e. fever, cold, flu, etc.) please notify your surgeon as soon as possible. 10. It is important to be on time. If a situation occurs where you may be delayed, please call:  [phone] / [phone] on the day of surgery. 11. The Preadmission Testing staff can be reached at [phone]. 12. Special instructions: NONE      Current Outpatient Medications   Medication Sig    amLODIPine (NORVASC) 10 mg tablet Take  by mouth daily.  aspirin 81 mg chewable tablet Take 81 mg by mouth daily.  tamsulosin (FLOMAX) 0.4 mg capsule Take 1 Capsule by mouth daily.  vancomycin (VANCOCIN) 125 mg capsule Take 1 Capsule by mouth four (4) times daily.  cholecalciferol (VITAMIN D3) (2,000 UNITS /50 MCG) cap capsule Take 2,000 Units by mouth daily.  insulin glargine (LANTUS) 100 unit/mL injection 25 Units by SubCUTAneous route daily (with breakfast).  insulin glargine (LANTUS) 100 unit/mL injection 20 Units by SubCUTAneous route daily (with dinner).  lisinopril (PRINIVIL, ZESTRIL) 40 mg tablet Take 40 mg by mouth daily.  insulin aspart (NOVOLOG) 100 unit/mL injection 16 Units by SubCUTAneous route Before breakfast, lunch, and dinner. Indications: TYPE 2 DIABETES MELLITUS     No current facility-administered medications for this encounter. 1. YOU MUST ONLY TAKE THESE MEDICATIONS THE MORNING OF SURGERY WITH A SIP OF WATER: ROSUVASTATIN, AMLODIPINE,PEPCID  2. MEDICATIONS TO TAKE THE MORNING OF SURGERY ONLY IF NEEDED: NONE  3. HOLD these prescription medications BEFORE Surgery: NONE  4. Ask your surgeon/prescribing physician about when/if to STOP taking these medications: NONE  5. Stop all vitamins, herbal medicines and Aspirin containing products 7 days prior to surgery. Stop any non-steroidal anti-inflammatory drugs (i.e. Ibuprofen, Naproxen, Advil, Aleve) 3 days before surgery. You may take Tylenol.     6. If you are currently taking Plavix, Coumadin, or any other blood-thinning/anticoagulant medication contact your prescribing physician for instructions. Preventing Infections Before and After - Your Surgery    IMPORTANT INSTRUCTIONS      You play an important role in your health and preparation for surgery. To reduce the germs on your skin you will need to shower with CHG soap (Chorhexidine gluconate 4%) two times before surgery. CHG soap (Hibiclens, Hex-A-Clens or store brand)   CHG soap will be provided at your Preadmission Testing (PAT) appointment.  If you do not have a PAT appointment before surgery, you may arrange to  CHG soap from our office or purchase CHG soap at a pharmacy, grocery or department store.  You need to purchase TWO 4 ounce bottles to use for your 2 showers. Steps to follow:  1. Wash your hair with your normal shampoo and your body with regular soap and rinse well to remove shampoo and soap from your skin. 2. Wet a clean washcloth and turn off the shower. 3. Put CHG soap on washcloth and apply to your entire body from the neck down. Do not use on your head, face or private parts(genitals). Do not use CHG soap on open sores, wounds or areas of skin irritation. 4. Wash you body gently for 5 minutes. Do not wash your skin too hard. This soap does not create lather. Pay special attention to your underarms and from your belly button to your feet. 5. Turn the shower back on and rinse well to get CHG soap off your body. 6. Pat your skin dry with a clean, dry towel. Do not apply lotions or moisturizer. 7. Put on clean clothes and sleep on fresh bed sheets and do not allow pets to sleep with you. Shower with CHG soap 2 times before your surgery   The evening before your surgery   The morning of your surgery      Tips to help prevent infections after your surgery:  1.  Protect your surgical wound from germs:  ? Hand washing is the most important thing you and your caregivers can do to prevent infections. ? Keep your bandage clean and dry! ? Do not touch your surgical wound. 2. Use clean, freshly washed towels and washcloths every time you shower; do not share bath linens with others. 3. Until your surgical wound is healed, wear clothing and sleep on bed linens each day that are clean and freshly washed. 4. Do not allow pets to sleep in your bed with you or touch your surgical wound. 5. Do not smoke - smoking delays wound healing. This may be a good time to stop smoking. 6. If you have diabetes, it is important for you to manage your blood sugar levels properly before your surgery as well as after your surgery. Poorly managed blood sugar levels slow down wound healing and prevent you from healing completely. Patient Information Regarding COVID Restrictions    Day of Procedure     Please park in the parking deck or any designated visitor parking lot.  Enter the facility through the [facility].   On the day of surgery, please provide the cell phone number of the person who will be waiting for you to the Patient Access representative at the time of registration.  Please wear a mask on the day of your procedure.  We are now allowing two designated visitors per stay. Pediatric patients may have 2 designated visitors. These two people may come in with you on the day of your procedure.  No visitors under the age of 13.  The designated visitor must also wear a mask.  Once your procedure and the immediate recovery period is completed, a nurse in the recovery area will contact your designated visitor to inform them of your room number or to otherwise review other pertinent information regarding your care.  Social distancing practices are to be adhered to in waiting areas and the cafeteria. The patient was contacted  in person. He verbalized understanding of all instructions does not  need reinforcement.

## 2022-04-25 ENCOUNTER — ANESTHESIA EVENT (OUTPATIENT)
Dept: SURGERY | Age: 72
End: 2022-04-25
Payer: MEDICARE

## 2022-04-26 ENCOUNTER — ANESTHESIA (OUTPATIENT)
Dept: SURGERY | Age: 72
End: 2022-04-26
Payer: MEDICARE

## 2022-04-26 ENCOUNTER — HOSPITAL ENCOUNTER (OUTPATIENT)
Age: 72
Setting detail: OBSERVATION
Discharge: HOME | End: 2022-04-27
Attending: UROLOGY | Admitting: UROLOGY
Payer: MEDICARE

## 2022-04-26 DIAGNOSIS — C61 PROSTATE CANCER (HCC): Primary | ICD-10-CM

## 2022-04-26 LAB
GLUCOSE BLD STRIP.AUTO-MCNC: 212 MG/DL (ref 65–117)
GLUCOSE BLD STRIP.AUTO-MCNC: 291 MG/DL (ref 65–117)
GLUCOSE BLD STRIP.AUTO-MCNC: 64 MG/DL (ref 65–117)
GLUCOSE BLD STRIP.AUTO-MCNC: 88 MG/DL (ref 65–117)
SERVICE CMNT-IMP: ABNORMAL
SERVICE CMNT-IMP: NORMAL

## 2022-04-26 PROCEDURE — 77030003578 HC NDL INSUF VERES AMR -B: Performed by: UROLOGY

## 2022-04-26 PROCEDURE — 88309 TISSUE EXAM BY PATHOLOGIST: CPT

## 2022-04-26 PROCEDURE — 77030002933 HC SUT MCRYL J&J -A: Performed by: UROLOGY

## 2022-04-26 PROCEDURE — G0378 HOSPITAL OBSERVATION PER HR: HCPCS

## 2022-04-26 PROCEDURE — 77030031139 HC SUT VCRL2 J&J -A: Performed by: UROLOGY

## 2022-04-26 PROCEDURE — 77030040922 HC BLNKT HYPOTHRM STRY -A

## 2022-04-26 PROCEDURE — 77030010507 HC ADH SKN DERMBND J&J -B: Performed by: UROLOGY

## 2022-04-26 PROCEDURE — 76010000877 HC OR TIME 2.5 TO 3HR INTENSV - TIER 2: Performed by: UROLOGY

## 2022-04-26 PROCEDURE — 77030002966 HC SUT PDS J&J -A: Performed by: UROLOGY

## 2022-04-26 PROCEDURE — 77030026438 HC STYL ET INTUB CARD -A: Performed by: NURSE ANESTHETIST, CERTIFIED REGISTERED

## 2022-04-26 PROCEDURE — 77030008684 HC TU ET CUF COVD -B: Performed by: NURSE ANESTHETIST, CERTIFIED REGISTERED

## 2022-04-26 PROCEDURE — 74011250636 HC RX REV CODE- 250/636: Performed by: STUDENT IN AN ORGANIZED HEALTH CARE EDUCATION/TRAINING PROGRAM

## 2022-04-26 PROCEDURE — 74011636637 HC RX REV CODE- 636/637: Performed by: UROLOGY

## 2022-04-26 PROCEDURE — 74011000250 HC RX REV CODE- 250: Performed by: UROLOGY

## 2022-04-26 PROCEDURE — 76060000036 HC ANESTHESIA 2.5 TO 3 HR: Performed by: UROLOGY

## 2022-04-26 PROCEDURE — 77030007955 HC PCH ENDOSC SPEC J&J -B: Performed by: UROLOGY

## 2022-04-26 PROCEDURE — 77030022704 HC SUT VLOC COVD -B: Performed by: UROLOGY

## 2022-04-26 PROCEDURE — 77030016151 HC PROTCTR LNS DFOG COVD -B: Performed by: UROLOGY

## 2022-04-26 PROCEDURE — 77030008756 HC TU IRR SUC STRY -B: Performed by: UROLOGY

## 2022-04-26 PROCEDURE — 77030040361 HC SLV COMPR DVT MDII -B: Performed by: UROLOGY

## 2022-04-26 PROCEDURE — 74011250637 HC RX REV CODE- 250/637: Performed by: UROLOGY

## 2022-04-26 PROCEDURE — 74011250636 HC RX REV CODE- 250/636: Performed by: NURSE ANESTHETIST, CERTIFIED REGISTERED

## 2022-04-26 PROCEDURE — 74011000250 HC RX REV CODE- 250: Performed by: NURSE ANESTHETIST, CERTIFIED REGISTERED

## 2022-04-26 PROCEDURE — 88307 TISSUE EXAM BY PATHOLOGIST: CPT

## 2022-04-26 PROCEDURE — 77030031492 HC PRT ACC BLNT AIRSEAL CNMD -B: Performed by: UROLOGY

## 2022-04-26 PROCEDURE — 74011250636 HC RX REV CODE- 250/636: Performed by: UROLOGY

## 2022-04-26 PROCEDURE — 82962 GLUCOSE BLOOD TEST: CPT

## 2022-04-26 PROCEDURE — 77030020703 HC SEAL CANN DISP INTU -B: Performed by: UROLOGY

## 2022-04-26 PROCEDURE — 77030033730 HC CLP LAPRO ABS LPW COVD -C: Performed by: UROLOGY

## 2022-04-26 PROCEDURE — 77030035277 HC OBTRTR BLDELSS DISP INTU -B: Performed by: UROLOGY

## 2022-04-26 PROCEDURE — 88305 TISSUE EXAM BY PATHOLOGIST: CPT

## 2022-04-26 PROCEDURE — 77030012893

## 2022-04-26 PROCEDURE — 2709999900 HC NON-CHARGEABLE SUPPLY: Performed by: UROLOGY

## 2022-04-26 PROCEDURE — 76210000006 HC OR PH I REC 0.5 TO 1 HR: Performed by: UROLOGY

## 2022-04-26 RX ORDER — PHENYLEPHRINE HCL IN 0.9% NACL 0.4MG/10ML
SYRINGE (ML) INTRAVENOUS AS NEEDED
Status: DISCONTINUED | OUTPATIENT
Start: 2022-04-26 | End: 2022-04-26 | Stop reason: HOSPADM

## 2022-04-26 RX ORDER — SODIUM CHLORIDE 0.9 % (FLUSH) 0.9 %
5-40 SYRINGE (ML) INJECTION EVERY 8 HOURS
Status: DISCONTINUED | OUTPATIENT
Start: 2022-04-26 | End: 2022-04-26 | Stop reason: HOSPADM

## 2022-04-26 RX ORDER — FAMOTIDINE 20 MG/1
20 TABLET, FILM COATED ORAL
Status: DISCONTINUED | OUTPATIENT
Start: 2022-04-27 | End: 2022-04-27 | Stop reason: HOSPADM

## 2022-04-26 RX ORDER — SODIUM CHLORIDE, SODIUM LACTATE, POTASSIUM CHLORIDE, CALCIUM CHLORIDE 600; 310; 30; 20 MG/100ML; MG/100ML; MG/100ML; MG/100ML
INJECTION, SOLUTION INTRAVENOUS
Status: DISCONTINUED | OUTPATIENT
Start: 2022-04-26 | End: 2022-04-26 | Stop reason: HOSPADM

## 2022-04-26 RX ORDER — ONDANSETRON 2 MG/ML
INJECTION INTRAMUSCULAR; INTRAVENOUS AS NEEDED
Status: DISCONTINUED | OUTPATIENT
Start: 2022-04-26 | End: 2022-04-26 | Stop reason: HOSPADM

## 2022-04-26 RX ORDER — KETOROLAC TROMETHAMINE 30 MG/ML
15 INJECTION, SOLUTION INTRAMUSCULAR; INTRAVENOUS EVERY 6 HOURS
Status: DISCONTINUED | OUTPATIENT
Start: 2022-04-26 | End: 2022-04-27 | Stop reason: HOSPADM

## 2022-04-26 RX ORDER — SODIUM CHLORIDE 0.9 % (FLUSH) 0.9 %
5-40 SYRINGE (ML) INJECTION EVERY 8 HOURS
Status: CANCELLED | OUTPATIENT
Start: 2022-04-26

## 2022-04-26 RX ORDER — MAGNESIUM SULFATE 100 %
4 CRYSTALS MISCELLANEOUS AS NEEDED
Status: DISCONTINUED | OUTPATIENT
Start: 2022-04-26 | End: 2022-04-27 | Stop reason: HOSPADM

## 2022-04-26 RX ORDER — SODIUM CHLORIDE 0.9 % (FLUSH) 0.9 %
5-40 SYRINGE (ML) INJECTION AS NEEDED
Status: CANCELLED | OUTPATIENT
Start: 2022-04-26

## 2022-04-26 RX ORDER — SODIUM CHLORIDE 0.9 % (FLUSH) 0.9 %
5-40 SYRINGE (ML) INJECTION EVERY 8 HOURS
Status: DISCONTINUED | OUTPATIENT
Start: 2022-04-26 | End: 2022-04-27 | Stop reason: HOSPADM

## 2022-04-26 RX ORDER — GLYCOPYRROLATE 0.2 MG/ML
INJECTION INTRAMUSCULAR; INTRAVENOUS AS NEEDED
Status: DISCONTINUED | OUTPATIENT
Start: 2022-04-26 | End: 2022-04-26 | Stop reason: HOSPADM

## 2022-04-26 RX ORDER — SODIUM CHLORIDE 0.9 % (FLUSH) 0.9 %
5-40 SYRINGE (ML) INJECTION AS NEEDED
Status: DISCONTINUED | OUTPATIENT
Start: 2022-04-26 | End: 2022-04-26 | Stop reason: HOSPADM

## 2022-04-26 RX ORDER — GLIPIZIDE 5 MG/1
10 TABLET ORAL
Status: DISCONTINUED | OUTPATIENT
Start: 2022-04-27 | End: 2022-04-27 | Stop reason: HOSPADM

## 2022-04-26 RX ORDER — FENTANYL CITRATE 50 UG/ML
INJECTION, SOLUTION INTRAMUSCULAR; INTRAVENOUS AS NEEDED
Status: DISCONTINUED | OUTPATIENT
Start: 2022-04-26 | End: 2022-04-26 | Stop reason: HOSPADM

## 2022-04-26 RX ORDER — FENTANYL CITRATE 50 UG/ML
25 INJECTION, SOLUTION INTRAMUSCULAR; INTRAVENOUS
Status: DISCONTINUED | OUTPATIENT
Start: 2022-04-26 | End: 2022-04-26 | Stop reason: HOSPADM

## 2022-04-26 RX ORDER — OXYCODONE HYDROCHLORIDE 5 MG/1
5 TABLET ORAL
Status: DISCONTINUED | OUTPATIENT
Start: 2022-04-26 | End: 2022-04-27 | Stop reason: HOSPADM

## 2022-04-26 RX ORDER — PROMETHAZINE HYDROCHLORIDE 25 MG/1
12.5 TABLET ORAL
Status: DISCONTINUED | OUTPATIENT
Start: 2022-04-26 | End: 2022-04-27 | Stop reason: HOSPADM

## 2022-04-26 RX ORDER — DEXTROSE, SODIUM CHLORIDE, AND POTASSIUM CHLORIDE 5; .45; .15 G/100ML; G/100ML; G/100ML
150 INJECTION INTRAVENOUS CONTINUOUS
Status: DISCONTINUED | OUTPATIENT
Start: 2022-04-26 | End: 2022-04-27 | Stop reason: HOSPADM

## 2022-04-26 RX ORDER — ONDANSETRON 2 MG/ML
4 INJECTION INTRAMUSCULAR; INTRAVENOUS AS NEEDED
Status: DISCONTINUED | OUTPATIENT
Start: 2022-04-26 | End: 2022-04-26 | Stop reason: HOSPADM

## 2022-04-26 RX ORDER — ROSUVASTATIN CALCIUM 10 MG/1
5 TABLET, COATED ORAL
Status: DISCONTINUED | OUTPATIENT
Start: 2022-04-27 | End: 2022-04-27 | Stop reason: HOSPADM

## 2022-04-26 RX ORDER — DEXAMETHASONE SODIUM PHOSPHATE 4 MG/ML
INJECTION, SOLUTION INTRA-ARTICULAR; INTRALESIONAL; INTRAMUSCULAR; INTRAVENOUS; SOFT TISSUE AS NEEDED
Status: DISCONTINUED | OUTPATIENT
Start: 2022-04-26 | End: 2022-04-26 | Stop reason: HOSPADM

## 2022-04-26 RX ORDER — PROPOFOL 10 MG/ML
INJECTION, EMULSION INTRAVENOUS AS NEEDED
Status: DISCONTINUED | OUTPATIENT
Start: 2022-04-26 | End: 2022-04-26 | Stop reason: HOSPADM

## 2022-04-26 RX ORDER — MIDAZOLAM HYDROCHLORIDE 1 MG/ML
INJECTION, SOLUTION INTRAMUSCULAR; INTRAVENOUS AS NEEDED
Status: DISCONTINUED | OUTPATIENT
Start: 2022-04-26 | End: 2022-04-26 | Stop reason: HOSPADM

## 2022-04-26 RX ORDER — MORPHINE SULFATE 2 MG/ML
2 INJECTION, SOLUTION INTRAMUSCULAR; INTRAVENOUS
Status: DISCONTINUED | OUTPATIENT
Start: 2022-04-26 | End: 2022-04-27 | Stop reason: HOSPADM

## 2022-04-26 RX ORDER — SODIUM CHLORIDE, SODIUM LACTATE, POTASSIUM CHLORIDE, CALCIUM CHLORIDE 600; 310; 30; 20 MG/100ML; MG/100ML; MG/100ML; MG/100ML
500 INJECTION, SOLUTION INTRAVENOUS ONCE
Status: COMPLETED | OUTPATIENT
Start: 2022-04-26 | End: 2022-04-26

## 2022-04-26 RX ORDER — LIDOCAINE HYDROCHLORIDE 10 MG/ML
0.1 INJECTION, SOLUTION EPIDURAL; INFILTRATION; INTRACAUDAL; PERINEURAL AS NEEDED
Status: CANCELLED | OUTPATIENT
Start: 2022-04-26

## 2022-04-26 RX ORDER — HYDROMORPHONE HYDROCHLORIDE 2 MG/ML
INJECTION, SOLUTION INTRAMUSCULAR; INTRAVENOUS; SUBCUTANEOUS AS NEEDED
Status: DISCONTINUED | OUTPATIENT
Start: 2022-04-26 | End: 2022-04-26 | Stop reason: HOSPADM

## 2022-04-26 RX ORDER — SODIUM CHLORIDE, SODIUM LACTATE, POTASSIUM CHLORIDE, CALCIUM CHLORIDE 600; 310; 30; 20 MG/100ML; MG/100ML; MG/100ML; MG/100ML
25 INJECTION, SOLUTION INTRAVENOUS CONTINUOUS
Status: DISCONTINUED | OUTPATIENT
Start: 2022-04-26 | End: 2022-04-26 | Stop reason: HOSPADM

## 2022-04-26 RX ORDER — ROCURONIUM BROMIDE 10 MG/ML
INJECTION, SOLUTION INTRAVENOUS AS NEEDED
Status: DISCONTINUED | OUTPATIENT
Start: 2022-04-26 | End: 2022-04-26 | Stop reason: HOSPADM

## 2022-04-26 RX ORDER — BUPIVACAINE HYDROCHLORIDE 2.5 MG/ML
INJECTION, SOLUTION EPIDURAL; INFILTRATION; INTRACAUDAL AS NEEDED
Status: DISCONTINUED | OUTPATIENT
Start: 2022-04-26 | End: 2022-04-26 | Stop reason: HOSPADM

## 2022-04-26 RX ORDER — SODIUM CHLORIDE 0.9 % (FLUSH) 0.9 %
5-40 SYRINGE (ML) INJECTION AS NEEDED
Status: DISCONTINUED | OUTPATIENT
Start: 2022-04-26 | End: 2022-04-27 | Stop reason: HOSPADM

## 2022-04-26 RX ORDER — LIDOCAINE HYDROCHLORIDE 20 MG/ML
INJECTION, SOLUTION EPIDURAL; INFILTRATION; INTRACAUDAL; PERINEURAL AS NEEDED
Status: DISCONTINUED | OUTPATIENT
Start: 2022-04-26 | End: 2022-04-26 | Stop reason: HOSPADM

## 2022-04-26 RX ORDER — NEOSTIGMINE METHYLSULFATE 1 MG/ML
INJECTION, SOLUTION INTRAVENOUS AS NEEDED
Status: DISCONTINUED | OUTPATIENT
Start: 2022-04-26 | End: 2022-04-26 | Stop reason: HOSPADM

## 2022-04-26 RX ORDER — AMLODIPINE BESYLATE 5 MG/1
10 TABLET ORAL DAILY
Status: DISCONTINUED | OUTPATIENT
Start: 2022-04-27 | End: 2022-04-27 | Stop reason: HOSPADM

## 2022-04-26 RX ORDER — ACETAMINOPHEN 500 MG
1000 TABLET ORAL EVERY 6 HOURS
Status: DISCONTINUED | OUTPATIENT
Start: 2022-04-26 | End: 2022-04-27 | Stop reason: HOSPADM

## 2022-04-26 RX ORDER — HYDROMORPHONE HYDROCHLORIDE 1 MG/ML
0.2 INJECTION, SOLUTION INTRAMUSCULAR; INTRAVENOUS; SUBCUTANEOUS
Status: DISCONTINUED | OUTPATIENT
Start: 2022-04-26 | End: 2022-04-26 | Stop reason: HOSPADM

## 2022-04-26 RX ORDER — ONDANSETRON 2 MG/ML
4 INJECTION INTRAMUSCULAR; INTRAVENOUS
Status: DISCONTINUED | OUTPATIENT
Start: 2022-04-26 | End: 2022-04-27 | Stop reason: HOSPADM

## 2022-04-26 RX ORDER — ATROPA BELLADONNA AND OPIUM 16.2; 3 MG/1; MG/1
1 SUPPOSITORY RECTAL
Status: DISCONTINUED | OUTPATIENT
Start: 2022-04-26 | End: 2022-04-26 | Stop reason: HOSPADM

## 2022-04-26 RX ORDER — HYDROCODONE BITARTRATE AND ACETAMINOPHEN 5; 325 MG/1; MG/1
1 TABLET ORAL
Qty: 10 TABLET | Refills: 0 | Status: SHIPPED | OUTPATIENT
Start: 2022-04-26 | End: 2022-04-28

## 2022-04-26 RX ORDER — BUPIVACAINE HYDROCHLORIDE 5 MG/ML
30 INJECTION, SOLUTION EPIDURAL; INTRACAUDAL ONCE
Status: DISCONTINUED | OUTPATIENT
Start: 2022-04-26 | End: 2022-04-26 | Stop reason: HOSPADM

## 2022-04-26 RX ORDER — LISINOPRIL 20 MG/1
40 TABLET ORAL
Status: DISCONTINUED | OUTPATIENT
Start: 2022-04-27 | End: 2022-04-27 | Stop reason: HOSPADM

## 2022-04-26 RX ORDER — SODIUM CHLORIDE, SODIUM LACTATE, POTASSIUM CHLORIDE, CALCIUM CHLORIDE 600; 310; 30; 20 MG/100ML; MG/100ML; MG/100ML; MG/100ML
50 INJECTION, SOLUTION INTRAVENOUS CONTINUOUS
Status: CANCELLED | OUTPATIENT
Start: 2022-04-26 | End: 2022-04-27

## 2022-04-26 RX ADMIN — POTASSIUM CHLORIDE, DEXTROSE MONOHYDRATE AND SODIUM CHLORIDE 150 ML/HR: 150; 5; 450 INJECTION, SOLUTION INTRAVENOUS at 18:28

## 2022-04-26 RX ADMIN — NEOSTIGMINE METHYLSULFATE 3 MG: 1 INJECTION, SOLUTION INTRAVENOUS at 10:55

## 2022-04-26 RX ADMIN — Medication 15 MG: at 17:59

## 2022-04-26 RX ADMIN — Medication 120 MCG: at 08:36

## 2022-04-26 RX ADMIN — SODIUM CHLORIDE, POTASSIUM CHLORIDE, SODIUM LACTATE AND CALCIUM CHLORIDE 500 ML/HR: 600; 310; 30; 20 INJECTION, SOLUTION INTRAVENOUS at 11:30

## 2022-04-26 RX ADMIN — ROCURONIUM BROMIDE 50 MG: 10 SOLUTION INTRAVENOUS at 08:36

## 2022-04-26 RX ADMIN — SODIUM CHLORIDE, POTASSIUM CHLORIDE, SODIUM LACTATE AND CALCIUM CHLORIDE 25 ML/HR: 600; 310; 30; 20 INJECTION, SOLUTION INTRAVENOUS at 08:11

## 2022-04-26 RX ADMIN — FENTANYL CITRATE 50 MCG: 50 INJECTION, SOLUTION INTRAMUSCULAR; INTRAVENOUS at 08:53

## 2022-04-26 RX ADMIN — ACETAMINOPHEN 1000 MG: 500 TABLET ORAL at 12:49

## 2022-04-26 RX ADMIN — FENTANYL CITRATE 50 MCG: 50 INJECTION, SOLUTION INTRAMUSCULAR; INTRAVENOUS at 08:36

## 2022-04-26 RX ADMIN — ROCURONIUM BROMIDE 10 MG: 10 SOLUTION INTRAVENOUS at 10:05

## 2022-04-26 RX ADMIN — GLYCOPYRROLATE 0.2 MG: 0.2 INJECTION, SOLUTION INTRAMUSCULAR; INTRAVENOUS at 09:09

## 2022-04-26 RX ADMIN — Medication 2 UNITS: at 17:59

## 2022-04-26 RX ADMIN — LIDOCAINE HYDROCHLORIDE 100 MG: 20 INJECTION, SOLUTION EPIDURAL; INFILTRATION; INTRACAUDAL; PERINEURAL at 08:36

## 2022-04-26 RX ADMIN — HYDROMORPHONE HYDROCHLORIDE 0.5 MG: 2 INJECTION, SOLUTION INTRAMUSCULAR; INTRAVENOUS; SUBCUTANEOUS at 09:13

## 2022-04-26 RX ADMIN — SODIUM CHLORIDE, POTASSIUM CHLORIDE, SODIUM LACTATE AND CALCIUM CHLORIDE: 600; 310; 30; 20 INJECTION, SOLUTION INTRAVENOUS at 10:55

## 2022-04-26 RX ADMIN — SODIUM CHLORIDE, POTASSIUM CHLORIDE, SODIUM LACTATE AND CALCIUM CHLORIDE: 600; 310; 30; 20 INJECTION, SOLUTION INTRAVENOUS at 08:10

## 2022-04-26 RX ADMIN — PHENYLEPHRINE HYDROCHLORIDE 40 MCG/MIN: 10 INJECTION INTRAVENOUS at 08:48

## 2022-04-26 RX ADMIN — ACETAMINOPHEN 1000 MG: 500 TABLET ORAL at 18:00

## 2022-04-26 RX ADMIN — DEXAMETHASONE SODIUM PHOSPHATE 4 MG: 4 INJECTION, SOLUTION INTRAMUSCULAR; INTRAVENOUS at 08:47

## 2022-04-26 RX ADMIN — ONDANSETRON HYDROCHLORIDE 4 MG: 2 INJECTION, SOLUTION INTRAMUSCULAR; INTRAVENOUS at 08:48

## 2022-04-26 RX ADMIN — SODIUM CHLORIDE, PRESERVATIVE FREE 10 ML: 5 INJECTION INTRAVENOUS at 22:00

## 2022-04-26 RX ADMIN — PROPOFOL 150 MG: 10 INJECTION, EMULSION INTRAVENOUS at 08:36

## 2022-04-26 RX ADMIN — PROPOFOL 50 MG: 10 INJECTION, EMULSION INTRAVENOUS at 10:47

## 2022-04-26 RX ADMIN — GLYCOPYRROLATE 0.4 MG: 0.2 INJECTION, SOLUTION INTRAMUSCULAR; INTRAVENOUS at 10:55

## 2022-04-26 RX ADMIN — MIDAZOLAM 2 MG: 1 INJECTION INTRAMUSCULAR; INTRAVENOUS at 08:36

## 2022-04-26 RX ADMIN — WATER 2 G: 1 INJECTION INTRAMUSCULAR; INTRAVENOUS; SUBCUTANEOUS at 09:00

## 2022-04-26 RX ADMIN — Medication 15 MG: at 12:49

## 2022-04-26 RX ADMIN — POTASSIUM CHLORIDE, DEXTROSE MONOHYDRATE AND SODIUM CHLORIDE 150 ML/HR: 150; 5; 450 INJECTION, SOLUTION INTRAVENOUS at 11:54

## 2022-04-26 NOTE — BRIEF OP NOTE
Brief Postoperative Note    Patient: Nargis Mcconnell. YOB: 1950  MRN: [id number]    Date of Procedure: 4/26/2022     Pre-Op Diagnosis: PROSTATE CANCER    Post-Op Diagnosis: Same as preoperative diagnosis.       Procedure(s):  DAVINCI RADICAL ROBOTIC ASST LAPAROSCOPIC PROSTATECTOMY,  BILATERAL DISSECTION OF PELVIC LYMPH NODES    Surgeon(s):  Leonel Mendoza MD    Surgical Assistant: Surg Asst-1: Gilberto Navas    Anesthesia: General     Estimated Blood Loss (mL): less than 295     Complications: None    Specimens:   ID Type Source Tests Collected by Time Destination   1 : PROSTATE Fresh Prostate  Leonel Mendoza MD 4/26/2022 [facility]ology   2 : LEFT PELVIC LYMPH NODE Fresh Lymph Node  Leonel Mendoza MD 4/26/2022 1048 Pathology   3 : RIGHT PELVIC LYMPH NODE Fresh Lymph Node  Leonel Mendoza MD 4/26/2022 1048 Pathology        Implants: * No implants in log *    Drains: * No LDAs found *    Findings: PCA    Electronically Signed by Cipriano Beverly MD on 4/26/2022 at 1:53 PM

## 2022-04-26 NOTE — OP NOTES
OPERATIVE REPORT      PREOPERATIVE DIAGNOSIS: Adenocarcinoma of the prostate. POSTOPERATIVE DIAGNOSIS: Adenocarcinoma of the prostate. OPERATIVE PROCEDURE  DaVinci robotic-assisted laparoscopic radical prostatectomy. Bilateral Nerve sparing    Bilateral Lymph node dissection    SURGEON: Bhargav Verduzco MD    ASSISTANT: Nursing staff    ANESTHESIA: General endotracheal.    COMPLICATIONS: None. EBL: 100cc    INDICATIONS: T1c G 4+3 = 7 PCA     DESCRIPTION OF PROCEDURE: After informed consent was obtained, the patient  was given appropriate IV antibiotic prophylaxis in the holding area. The  patient was then brought to the operative theatre, where he received  general anesthesia. The patient was carefully placed in a low lithotomy,  Trendelenburg position. All pressure points were padded appropriately. The patient's abdomen and genitalia were shaved, prepared and draped in the  usual sterile fashion. A Vargas catheter was introduced into the bladder  transurethrally. A supraumbilical incision was made with a #15 scalpel blade. Veress needle placed into the peritoneal cavity and position confirmed with saline irrigation. Veress needle connected to C02 gas to generate a pneumoperitoneum of 15mm H20 pressure,  followed by introduction of the camera port and camera through this incision site. The  remaining ports were placed under camera vision. Two robotic arm ports were  placed, flanking the umbilicus at the lateral border of the rectus  abdominus muscle. The 4th arm robotic port was placed in the right lower  abdominal quadrant and the accessory port was placed in the left lower  abdominal quadrant. The robot was then docked and the procedure ensued at  the surgical console. The bladder was released from the anterior pelvis by transection of the  medial umbilical ligaments and urachal remnant. The space of Retzius was  defined and anterior prostatic fat was dissected off and removed. The  endopelvic fascia was sharply incised from the prostatic base to the apex. Pubo-prostatic ligaments were taken down sharply as well. The dorsal venous  complex was doubly ligated with interrupted 1-0 Vicryl suture. The bladder  neck was identified and subsequently transected anteriorly to expose the  Vargas catheter. The Vargas catheter was retracted to complete the bladder  neck transaction posteriorly. This dissection continued posteriorly to the  level of the vas ampullae and seminal vesicles. These were dissected out  and lifted anteriorly. Posterior Denonvilliers fascia was incised  transversely to begin the dissection of the posterior aspect of the  prostate off the rectum and pre-rectal fat. The vascular pedicles of the  prostate were transected with selective arterial pinpoint cautery hemostasis. The lateral prostatic fascia was incised from the prostatic base to the apex bilaterally to begin  the delicate nerve-sparing portion of the procedure. Athermic and  atraumatic dissection was carefully performed to release both cavernous  neurovascular bundles from the posterior lateral aspect of the prostate. The dorsal venous complex was transected just beyond the apex of the  prostate. The urethra was then transected and the prostate specimen was  placed into a retrieval bag and positioned in the left paracolic gutter for  later extraction. The pelvis was then copiously irrigated with saline and  inspected for significant bleeding or injury. None was seen. The  vesico-urethral anastomosis was performed with double-armed 3-0 v-lock suture in  a running fashion. A new Vargas catheter was anchored transurethrally. The robot was de-docked and all trocars were removed  under vision to confirm hemostasis. The supraumbilical incision was  lengthened to remove the specimen. The abdominal fascia in this area was  approximated with 1-0 PDS in a figure-of-eight fashion.  All incision sites  were injected with 0.25% Marcaine. Subcuticular closure was performed with  3-0 Monocryl and skin approximation with Dermabond. The patient was  repositioned supine and awakened, extubated and transferred to the recovery  room in good condition. Addendum:    1. Anterior approach used. 2. Endopelvic fascia incised. 3. No median lobe identified on bladder neck transection. 4. Watertight vesicourethral anastomosis confirmed. 5. Inflammatory response surrounding posterior prostate  6. Attention was then taken to expose the obturator lymph node packet. A bilateral pelvic lymph node dissection was performed along the tatum-medial aspect of the external iliac vein towards the pelvic side wall. Posterior limits defined by the obturator neurovascular structures. Surgical clips applied at the pubic bone distally and the bifurcation of the iliac vessels proximally. The specimen was placed in the retrieval bag with the prostate.

## 2022-04-26 NOTE — PERIOP NOTES
PATIENT INTERVIEWED IN PREOP. NAME BAND VISIBLE AND CORRECT PER PATIENT. PATIENT HAS UNDERSTANDING OF PROCEDURE AND SURGICAL SITE. EDUCATIONAL NEEDS MET. PATIENT STATES BACK PAIN AT 4.

## 2022-04-26 NOTE — PERIOP NOTES
TRANSFER - OUT REPORT:    Verbal report given to [facility]. (name) on Pamela Lo.  being transferred to [address] [place] [address] (unit) for routine post - op       Report consisted of patients Situation, Background, Assessment and   Recommendations(SBAR). Time Pre op antibiotic given:0900  Anesthesia Stop time: 7893  Vargas Present on Transfer to floor:yes  Order for Vargas on Chart:yes  Discharge Prescriptions with Chart:no    Information from the following report(s) SBAR, OR Summary, Procedure Summary, Intake/Output, MAR, Recent Results and Cardiac Rhythm NSR; Sinus Divinapatel Garrett was reviewed with the receiving nurse. Opportunity for questions and clarification was provided. Is the patient on 02? YES       L/Min 2       Other nc    Is the patient on a monitor? NO    Is the nurse transporting with the patient? NO    Surgical Waiting Area notified of patient's transfer from PACU?  YES      The following personal items collected during your admission accompanied patient upon transfer:   Dental Appliance: Dental Appliances:  (wife has dentures per patient)  Vision: Visual Aid: None  Hearing Aid:    Jewelry:    Clothing: Clothing: With patient  Other Valuables:    Valuables sent to safe:

## 2022-04-26 NOTE — PROGRESS NOTES
Medicare Outpatient Observation Notice (MOON) provided to patient/representative with verbal explanation of the notice. Time allotted for questions regarding the notice. Patient /representative provided a completed copy of the MOON notice. Copy placed on bedside chart.   Ericka Horta Care Management Assistant

## 2022-04-26 NOTE — ANESTHESIA PREPROCEDURE EVALUATION
Relevant Problems   CARDIOVASCULAR   (+) CAD (coronary artery disease)      ENDOCRINE   (+) DM (diabetes mellitus) (HCC)       Anesthetic History   No history of anesthetic complications            Review of Systems / Medical History  Patient summary reviewed, nursing notes reviewed and pertinent labs reviewed    Pulmonary  Within defined limits                 Neuro/Psych   Within defined limits    CVA       Cardiovascular    Hypertension          Hyperlipidemia         GI/Hepatic/Renal     GERD      PUD     Endo/Other  Within defined limits  Diabetes: using insulin         Other Findings              Physical Exam    Airway  Mallampati: II  TM Distance: > 6 cm  Neck ROM: normal range of motion   Mouth opening: Normal     Cardiovascular  Regular rate and rhythm,  S1 and S2 normal,  no murmur, click, rub, or gallop             Dental  No notable dental hx       Pulmonary  Breath sounds clear to auscultation               Abdominal  GI exam deferred       Other Findings            Anesthetic Plan    ASA: 3  Anesthesia type: general            Anesthetic plan and risks discussed with: Patient

## 2022-04-26 NOTE — PROGRESS NOTES
1600: offered/ attempted to ambulate patient, refused, stated he just wants to nap now and later on he will get OOB

## 2022-04-26 NOTE — ANESTHESIA POSTPROCEDURE EVALUATION
Post-Anesthesia Evaluation and Assessment    Patient: Pamela Lo. MRN: [id number]  SSN: [id number]    YOB: 1950  Age: 70 y.o. Sex: male      I have evaluated the patient and they are stable and ready for discharge from the PACU. Cardiovascular Function/Vital Signs  HR: 59  BP: 132/70  RR: 14  SpO2: 94% on 2 L nasal cannula  Temp: 36.6 C    Patient is status post General anesthesia for Procedure(s):  DAVINCI RADICAL ROBOTIC ASST LAPAROSCOPIC PROSTATECTOMY,  BILATERAL DISSECTION OF PELVIC LYMPH NODES. Nausea/Vomiting: None    Postoperative hydration reviewed and adequate. Pain:  Managed    Neurological Status: At baseline    Mental Status, Level of Consciousness: Alert and  oriented to person, place, and time    Pulmonary Status:   O2 Device: Nasal cannula (04/26/22 1230)   Adequate oxygenation and airway patent    Complications related to anesthesia: None    Post-anesthesia assessment completed. No concerns.      Signed By: Alicia Souza DO     April 26, 2022

## 2022-04-26 NOTE — DISCHARGE INSTRUCTIONS
Dr. Mila Garcia. Landon Annamarie Man PROSTATECTOMY  POST OPERATIVE INSTRUCTIONS    FOLLOW-UP VISIT    ? Dr. Rachel Jonas or his associate will see you back in the office in 1 week. ? At that time your final pathology report will be reviewed with you.  ? Your Vargas catheter will be evaluated for removal at that time. ? You will be re-educated on male kegel exercises to strengthen your pelvic muscles. This exercise is felt to hasten your recovery of urinary continence. Virtually everyone has leakage of urine upon removal of the catheter and recovery time is variable and everyone is different. Please be patient. ? Please bring an adult urinary pad (such as Depend Guards) with you on this appointment. DISCHARGE MEDICATIONS    ? Upon discharge you will be given prescriptions for pain control (Hydrocodone)   ? Most patients experience only minimal discomfort after this minimally invasive surgery. We recommend using Tylenol (acetaminophen) for pain first and reserve the narcotic pain medication as an alternative as it tends to cause constipation. ? You may resume your normal medications upon discharge from the hospital with the exception of any blood thinning products (such as coumadin or aspirin). ACTIVITY    ? Please refrain from driving for the 1st week after your surgery. ? You may shower upon discharge from the hospital. Avoid bathtubs, hot tubs or swimming pools during 1st 2 weeks. ? It is important to be mobile during your recovery period. Avoid sitting in one position for longer than 45 minutes to 1 hour. Walking and climbing stairs are OK. Be careful not to overdo it. ? Avoid any heavy lifting or strenuous activity for the first 2 weeks. ? Most patients ease into normal activities (including employment) after 2 weeks of recuperation. ? Most patients resume driving by the 2nd week. Please use your best judgment. CATHETER CARE    ?  Keep your [facility] urinary catheter below the level of your bladder at all times otherwise it may not drain properly. ? The leg bag can be fitted under your trousers for daytime use. The larger overnight bag will hold more urine and is best reserved for bedtime use.  ? Minimal care of this unit is required. Make sure there is slack on the catheter between your penis and the drainage bag. This should not be on any tension. Empty the bag when full. You may disconnect the urinary drainage bag when showering and let the catheter drain freely. ? A topical antibiotic ointment applied to the catheter as inserts into the penis will reduce discomfort from the catheter. This can be obtained over the counter at your local pharmacy. ? Do not perform the male kegel exercises while the urinary catheter is in place. CARE OF YOUR INCISION SITES    ? Application of ointments or creams to the incision sites is not recommended. ? The adhesive clear wound dressing will slough off naturally in 5 - 10 days  ? Do not pick at or apply ointments/medications to the adhesive dressing  ? Do not scrub, soak or expose incisions to prolonged moisture. MALE KEGEL EXERCISES    ? Once your [facility] urinary catheter is removed it will be safe to start these exercises. ? Your surgery removed your prostate and affected your secondary urinary control mechanisms. Your external sphincter must now take all the responsibility for keeping you dry and continent. It will take time and effort to strengthen this mechanism. How do you do Kegel exercises? The first step is to find the right muscles. Imagine that you are trying to stop yourself from passing gas. Squeeze the muscles you would use. If you sense a \"pulling\" feeling, those are the right muscles for pelvic exercises. It is important not to squeeze other muscles at the same time and not to hold your breath. Also, be careful not to tighten your stomach, leg, or buttock muscles.  Squeezing the wrong muscles can put more pressure on your bladder control muscles. Squeeze just the pelvic muscles. Repeat, but do not overdo it. Pull in the pelvic muscles and hold for a count of 3. Then relax for a count of 3. Work up to [employer] of 10 repeats. Be patient. Do not give up. It takes just 5 minutes, three to five times a day. Your bladder control may not improve for 3 to 6 weeks, although most people notice an improvement after a few weeks. DIET     Please avoid carbonated beverages and any other gas producing foods (such as flour, beans, or broccoli) for the 1st week or so. Small meals are best until bowel habits return to normal.    THINGS YOU MAY ENCOUNTER AFTER SURGERY    ? Bruising around incision sites. Not at all uncommon and should not alarm you. This will resolve with time. ? Abdominal distention, constipation or bloating. Make sure you are following the dietary instructions. If you dont have a bowel movement or pass gas or are feeling uncomfortable 24 hours after surgery, try taking Milk of Magnesia as directed on the bottle. If after two doses of Milk of Magnesia, you still have not had a bowel movement, it is safe to use a Dulcolax suppository (available over the counter at your local pharmacy). ? Scrotal/Penile swelling and bruising. This is quite common and should not alarm you. It may appear immediately after surgery or may start 4 -5 days after surgery. It should resolve in about 7 - 14 days. You may try elevating your scrotum with a small towel or washcloth when lying down. ? Bloody drainage around reddy catheter or in the urine. This is especially common after increased activity or following a bowel movement. Do not be alarmed. Resting for a short period and drinking plenty of fluids usually improves the situation. ? Leaking urine around Reddy catheter. This is not unusual.  The catheter is not perfect, but most of the urine should flow through the catheter into the drainage bag.  ? Bladder spasms. It is not uncommon with the catheter in and even after the catheter comes out to have bladder spasms. You may feel mild to severe bladder pain or cramping. You may also experience the sudden urge to urinate or a burning sensation when you urinate. Call your MD if this persists without relief. ? Perineal pain (pain between your rectum and scrotum)/Testicular discomfort. This may last for several weeks after surgery, but it will resolve. Call your MD if the pain medication does not alleviate this. ? Lower legs/ankle swelling. This is not abnormal with it occurs in both legs and should not alarm you. It should resolve in about 7 - 14 days. Elevation of your legs while sitting will help. CONTACT MD IMMEDIATELY IF YOU ARE EXPERIENCING ANY OF THE FOLLOWING SYMPTOMS:    ? Oral Temperature over 101° F.  ? Urine stops draining from Vargas into drainage bag.  ? Any pain not relieved by pain medication prescribed. ? Nausea/Vomiting. ? Large amount of blood clots in urine that are blocking the catheter from draining. ? Bladder spasms that are not relieved with pain medication. ? Uneven swelling of legs or ankles. ? Redness and/or large amount of swelling around your incision sites. ? Excessive bleeding or drainage from your incision sites.         [address]  [phone]

## 2022-04-26 NOTE — H&P
UROLOGY HISTORY AND PHYSICAL    Patient: Mayra Pena MRN: [id number]  SSN: [id number]    YOB: 1950  Age: 70 y.o. Sex: male          Date of Encounter:  April 26, 2022  Pre-existing [facility] Patient:            Assessment/Plan:  Prostate cancer. Robotic radical prostatectomy. History of Present Illness:  Patient is a 70 y.o. male. He presents with prostate cancer. Past Medical History:  No Known Allergies   Prior to Admission medications    Medication Sig Start Date End Date Taking? Authorizing Provider   rosuvastatin (Crestor) 5 mg tablet Take 5 mg by mouth every morning. Yes Provider, Historical   glipiZIDE (GLUCOTROL) 10 mg tablet Take 10 mg by mouth every morning. Yes Provider, Historical   METHOTREXATE 25 mg by Does Not Apply route every seven (7) days. Yes Provider, Historical   famotidine (Pepcid) 20 mg tablet Take 20 mg by mouth every morning. Yes Provider, Historical   chlorthalidone (HYGROTON) 25 mg tablet Take 25 mg by mouth as needed. Yes Provider, Historical   amLODIPine (NORVASC) 10 mg tablet Take  by mouth daily. Yes Provider, Historical   aspirin 81 mg chewable tablet Take 81 mg by mouth daily. Yes Provider, Historical   insulin glargine (LANTUS) 100 unit/mL injection 20 Units by SubCUTAneous route daily (with breakfast). Yes Provider, Historical   insulin glargine (LANTUS) 100 unit/mL injection 20 Units by SubCUTAneous route daily (with dinner). Yes Provider, Historical   lisinopril (PRINIVIL, ZESTRIL) 40 mg tablet Take 40 mg by mouth every morning. Yes Provider, Historical   insulin aspart (NOVOLOG) 100 unit/mL injection 16 Units by SubCUTAneous route Before breakfast, lunch, and dinner. Indications: TYPE 2 DIABETES MELLITUS   Yes Other, MD [place]   cholecalciferol (VITAMIN D3) (2,000 UNITS /50 MCG) cap capsule Take 2,000 Units by mouth daily.     Provider, Historical     PMHx:  has a past medical history of Chronic pain, Diabetes ([facility] [address] GERD (gastroesophageal reflux disease), H/O echocardiogram (2014), History of kidney infection, Hypercholesterolemia, Hypertension, Prostate cancer ([facility] [address] PUD (peptic ulcer disease), Rheumatoid arthritis involving right hand ([facility] [address] Sepsis ([facility] Stomach ulcer, and Stroke ([facility] [address] (2014). PSurgHx:  has a past surgical history that includes colonoscopy (N/A, 11/11/2020); hx colonoscopy; hx urological; pr prostate biopsy, needle, saturation sampling; and hx orthopaedic (Right). PSocHx:  reports that he has quit smoking. He has a 10.00 pack-year smoking history. He has never used smokeless tobacco. He reports that he does not drink alcohol and does not use drugs. ROS:  negative other than above.     Physical Exam:            General:    appears nontoxic                     Skin:  no clubbing, cyanosis, edema                HEENT:  NCAT, O/P Clear        Throat/Neck:  supple, no LAD                 Chest[de-identified]  CTA      Heart[de-identified]  Regular rate and rhythm             Abdomen/Flank[de-identified]  No CVAT, non-tender soft abdomen, no masses             Bladder[de-identified]  Bladder not palpable     Lymph nodes:  no Cervical, supraclavicular, and axillary LAD     Lab Results   Component Value Date/Time    WBC 4.4 04/12/2022 09:35 AM    HCT 45.5 04/12/2022 09:35 AM    PLATELET 842 02/76/3151 09:35 AM    Sodium 140 04/12/2022 09:35 AM    Potassium 4.0 04/12/2022 09:35 AM    Chloride 108 04/12/2022 09:35 AM    CO2 29 04/12/2022 09:35 AM    BUN 18 04/12/2022 09:35 AM    Creatinine 1.38 (H) 04/12/2022 09:35 AM    Glucose 134 (H) 04/12/2022 09:35 AM    Calcium 9.4 04/12/2022 09:35 AM    Magnesium 1.6 07/20/2021 02:45 PM    Phosphorus 2.0 (L) 07/23/2021 10:07 AM    INR 1.1 04/12/2022 09:35 AM       UA:   Lab Results   Component Value Date/Time    Color YELLOW/STRAW 04/12/2022 09:35 AM    Appearance CLEAR 04/12/2022 09:35 AM    Specific gravity 1.022 04/12/2022 09:35 AM    pH (UA) 6.0 04/12/2022 09:35 AM    Protein 30 (A) 04/12/2022 09:35 AM Glucose Negative 04/12/2022 09:35 AM    Ketone Negative 04/12/2022 09:35 AM    Bilirubin Negative 04/12/2022 09:35 AM    Urobilinogen 1.0 04/12/2022 09:35 AM    Nitrites Negative 04/12/2022 09:35 AM    Leukocyte Esterase Negative 04/12/2022 09:35 AM    Epithelial cells FEW 04/12/2022 09:35 AM    Bacteria Negative 04/12/2022 09:35 AM    WBC 0-4 04/12/2022 09:35 AM    RBC 0-5 04/12/2022 09:35 AM       Cultures:   Xrays:     Signed By: Sanjuana Ca MD  - April 26, 2022

## 2022-04-27 VITALS
WEIGHT: 195.11 LBS | SYSTOLIC BLOOD PRESSURE: 139 MMHG | OXYGEN SATURATION: 93 % | TEMPERATURE: 98.1 F | HEIGHT: 69 IN | DIASTOLIC BLOOD PRESSURE: 67 MMHG | HEART RATE: 81 BPM | RESPIRATION RATE: 16 BRPM | BODY MASS INDEX: 28.9 KG/M2

## 2022-04-27 LAB
GLUCOSE BLD STRIP.AUTO-MCNC: 329 MG/DL (ref 65–117)
SERVICE CMNT-IMP: ABNORMAL

## 2022-04-27 PROCEDURE — 77010033678 HC OXYGEN DAILY

## 2022-04-27 PROCEDURE — 74011636637 HC RX REV CODE- 636/637: Performed by: NURSE PRACTITIONER

## 2022-04-27 PROCEDURE — 74011250636 HC RX REV CODE- 250/636: Performed by: UROLOGY

## 2022-04-27 PROCEDURE — 74011636637 HC RX REV CODE- 636/637: Performed by: UROLOGY

## 2022-04-27 PROCEDURE — 74011000250 HC RX REV CODE- 250: Performed by: UROLOGY

## 2022-04-27 PROCEDURE — G0378 HOSPITAL OBSERVATION PER HR: HCPCS

## 2022-04-27 PROCEDURE — 82962 GLUCOSE BLOOD TEST: CPT

## 2022-04-27 PROCEDURE — 74011250637 HC RX REV CODE- 250/637: Performed by: UROLOGY

## 2022-04-27 RX ORDER — INSULIN GLARGINE 100 [IU]/ML
25 INJECTION, SOLUTION SUBCUTANEOUS DAILY
Status: DISCONTINUED | OUTPATIENT
Start: 2022-04-27 | End: 2022-04-27 | Stop reason: HOSPADM

## 2022-04-27 RX ADMIN — Medication 15 MG: at 06:00

## 2022-04-27 RX ADMIN — Medication 4 UNITS: at 01:32

## 2022-04-27 RX ADMIN — FAMOTIDINE 20 MG: 20 TABLET, FILM COATED ORAL at 08:24

## 2022-04-27 RX ADMIN — ACETAMINOPHEN 1000 MG: 500 TABLET ORAL at 08:24

## 2022-04-27 RX ADMIN — AMLODIPINE BESYLATE 10 MG: 5 TABLET ORAL at 08:37

## 2022-04-27 RX ADMIN — Medication 25 UNITS: at 10:00

## 2022-04-27 RX ADMIN — GLIPIZIDE 10 MG: 5 TABLET ORAL at 08:24

## 2022-04-27 RX ADMIN — ROSUVASTATIN 5 MG: 10 TABLET, FILM COATED ORAL at 08:24

## 2022-04-27 RX ADMIN — POTASSIUM CHLORIDE, DEXTROSE MONOHYDRATE AND SODIUM CHLORIDE 150 ML/HR: 150; 5; 450 INJECTION, SOLUTION INTRAVENOUS at 01:31

## 2022-04-27 RX ADMIN — ACETAMINOPHEN 1000 MG: 500 TABLET ORAL at 01:32

## 2022-04-27 RX ADMIN — Medication 15 MG: at 01:31

## 2022-04-27 RX ADMIN — SODIUM CHLORIDE, PRESERVATIVE FREE 10 ML: 5 INJECTION INTRAVENOUS at 06:00

## 2022-04-27 RX ADMIN — LISINOPRIL 40 MG: 20 TABLET ORAL at 08:24

## 2022-04-27 NOTE — PROGRESS NOTES
Progress Note    Patient: Sanford Pascal. MRN: [id number]  SSN: [id number]    YOB: 1950  Age: 70 y.o. Sex: male        ADMITTED:  2022 to Kirstie Acevedo MD  for Prostate cancer [facility] Maxinedorothyabilio Domenico. is 1 Day Post-Op Procedure(s):  DAVINCI RADICAL ROBOTIC ASST LAPAROSCOPIC PROSTATECTOMY,  BILATERAL DISSECTION OF PELVIC LYMPH NODES. He is doing well. AF,VSS. He has no pain. He has no nausea. He is tolerating a solid diet. Indwelling catheter is draining well. Clear yellow UA. Good UOP. He has been up ambulating in the halls and tolerated well. Denies CP, cough, or SOB. Vitals:  Temp (24hrs), Av.9 °F (36.6 °C), Min:97.5 °F (36.4 °C), Max:98.4 °F (36.9 °C)     Blood pressure 139/67, pulse 81, temperature 98.1 °F (36.7 °C), resp. rate 16, height 5' 9\" (1.753 m), weight 88.5 kg (195 lb 1.7 oz), SpO2 93 %. I&O's:   1901 -  0700  In: 1940 [P.O.:520; I.V.:1300]  Out: 2325 [Urine:2275]   No intake/output data recorded. Exam:   abdomen soft, appropriately TTP at surgical sites. All incisions clean/dry/intact without evidence of infection. Reddy with clear urine output. Labs:   No results for input(s): WBC, HGB, HCT, PLT, HGBEXT, HCTEXT, PLTEXT in the last 72 hours. No results for input(s): NA, K, CL, CO2, GLU, BUN, CREA, CA in the last 72 hours. Cultures:      Imaging:       Assessment:     - 1 Day Post-Op Procedure(s):  DAVINCI RADICAL ROBOTIC ASST LAPAROSCOPIC PROSTATECTOMY,  BILATERAL DISSECTION OF PELVIC LYMPH NODES    Active Problems:    Prostate cancer ([address] (2022)        Plan:     - Stable for dc home with reddy. DC teaching reviewed, all questions answered. F/U as arranged. Scripts sent.      Signed By: Lucero Chen NP - 2022

## 2022-04-27 NOTE — PROGRESS NOTES
RUR: Observation     HERBERTH: Home. Patient's wife to provide transport home once medically stable. Follow-up with PCP/specialist.     Primary Contact: Wife, Clarence Lan, [phone]    Care Management Interventions  PCP Verified by CM: Yes (Dr. Katie Cummins, last seen March 30, 2022)  Mode of Transport at Discharge: Other (see comment) (Wife)  Transition of Care Consult (CM Consult): Discharge Planning  Discharge Durable Medical Equipment: No  Physical Therapy Consult: No  Occupational Therapy Consult: No  Speech Therapy Consult: No  Support Systems: Spouse/Significant Other  Confirm Follow Up Transport: Family  The Plan for Transition of Care is Related to the Following Treatment Goals : Home  Discharge Location  Patient Expects to be Discharged to[de-identified] Home with family assistance    Reason for Admission:  Prostate cancer                    RUR Score:      Observation                Plan for utilizing home health:      None    PCP: First and Last name: Sharon Lloyd MD     Name of Practice:    Are you a current patient: Yes/No: Yes   Approximate date of last visit: March 30, 2022   Can you participate in a virtual visit with your PCP: Yes                    Current Advanced Directive/Advance Care Plan: Prior    Healthcare Decision Maker:   Click here to complete [address] including selection of the Healthcare Decision Maker Relationship (ie \"Primary\")             Primary Decision Maker: Makenna Jaimes - Spouse - [phone]                  Transition of Care Plan:     Home     CM met with patient at bedside to introduce self and explain role. Patient lives with his wife in a 2 story home with 3 steps to enter and 12 steps to enter the 2nd floor. Patient was independent with ADL's and IADL's. Patient mostly independent with ambulation; however does use a cane when needed. Patient also owns a FWW, WC and RTS due to a previous stroke.  Patient's wife will provide transport home once medically stable. CM verified patient's PCP, demographics and insurance. Preferred pharmacy is [facility] on [address] in [facility] with no barriers obtaining needed prescriptions. CM to continue to follow as needed.     ALEYDA Johnson   [phone]

## 2022-04-27 NOTE — DISCHARGE SUMMARY
Urology Discharge Summary    Patient: Karan Vargas. MRN: [id number]  SSN: [id number]    YOB: 1950  Age: 70 y.o. Sex: male               ADMISSION:  to Gretchen Bai MD by Janiya Hatfield MD  4/26/2022 ADMISSION DIAGNOSIS: Prostate cancer [facility]) Alejandra Drivers  4/27/2022 DISCHARGE DIAGNOSIS: [x]    Same  CONSULTS: None  PROCEDURES: POD# 1 Day Post-Op Procedure(s):  DAVINCI RADICAL ROBOTIC ASST LAPAROSCOPIC PROSTATECTOMY,  BILATERAL DISSECTION OF PELVIC LYMPH NODES    RECENT LABS: [unfilled]     HOSPITAL COURSE: [x]    Uncomplicated.      COMPLICATIONS: [x]    None identified  DISCHARGE TO:     [x]    Home  []    Rehab []    SNF  FOLLOWUP: one week  DISCHARGE MEDS: Norco    [x]    IT IS INTENDED THAT YOU CONTINUE TO TAKE YOUR PRIOR MEDICATIONS WITHOUT CHANGES WITH THE EXCEPTION OF:  []    NONE    [unfilled]      Reji Lamas NP 4/27/2022 8:18 AM

## 2022-04-28 ENCOUNTER — PATIENT OUTREACH (OUTPATIENT)
Dept: CASE MANAGEMENT | Age: 72
End: 2022-04-28

## 2022-04-28 RX ORDER — LOVASTATIN 10 MG/1
TABLET ORAL
COMMUNITY

## 2022-04-28 NOTE — ACP (ADVANCE CARE PLANNING)
Advance Care Planning:   Does patient have an Advance Directive:  yes; reviewed and current    Patient has living will. He will consider providing copy to scan into EMR.     Primary Decision Maker: Laury Montesinos - [phone]    Secondary Decision Maker: Carmen Xie - [facility] - [phone]

## 2022-04-28 NOTE — PROGRESS NOTES
Care Transitions Initial Call    Call within 2 business days of discharge: Yes     Patient: Tristian Penaloza. Patient : 1950 MRN: [id number]    [facility]       Complaint Diagnosis Description Type Department Provider    22  Prostate cancer [facility]) Admission (Discharged) Casi Figueroa MD        Was this an external facility discharge? No Discharge Facility: n/a    Challenges to be reviewed by the provider   Additional needs identified to be addressed with provider: no  none         Method of communication with provider : none    Discussed COVID-19 related testing which was not done at this time. Test results were not done. Patient informed of results, if available? n/a     Advance Care Planning:   Does patient have an Advance Directive:  yes; reviewed and current    Patient has living will. He will consider providing copy to scan into EMR. Primary Decision Maker: Yudelka Reyes - [phone]    Secondary Decision Maker: Jeanelle Favre [facility] [phone]    Inpatient Readmission Risk score: Unplanned Readmit Risk Score: 12    Was this a readmission? no   Patient stated reason for the admission: planned surgery- prostatectomy    Patients top risk factors for readmission: medical condition-recent surgery, DM   Interventions to address risk factors: Scheduled appointment with PCP-patient s/w PCP office this am. plans to follow up with provider after seen by urology, Scheduled appointment with Specialist-urology-Dr. [email or web address] and Obtained and reviewed discharge summary and/or continuity of care documents    Care Transition Nurse (CTN) contacted the patient by telephone to perform post hospital discharge assessment. Verified name and  with patient as identifiers. Provided introduction to self, and explanation of the CTN role. CTN reviewed discharge instructions, medical action plan and red flags with patient who verbalized understanding.  Were discharge instructions available to patient? yes. Reviewed appropriate site of care based on symptoms and resources available to patient including: PCP, Specialist and [facility] as resource. Patient given an opportunity to ask questions and does not have any further questions or concerns at this time. The patient agrees to contact the PCP office for questions related to their healthcare. Medication reconciliation was performed with patient, who verbalizes understanding of administration of home medications. Referral to Pharm D needed: no     Home Health/Outpatient orders at discharge: none     Durable Medical Equipment ordered at discharge: None      Discussed follow-up appointments. If no appointment was previously scheduled, appointment scheduling offered: n/a, office has contacted patient/patient s/w provider. Is follow up appointment scheduled within 7 days of discharge? yes, patient will see urology on 5/2. Breonna Scruggs Dr follow up appointment(s): No future appointments. Non-[facility] follow up appointment(s):   PCP- Dr. Warren Reasons -patient s/w provider today. Will plan follow up after seen by surgeon next week. Urology- [email or web address]. [email or web address]    Plan for follow-up call in 5-7 days based on severity of symptoms and risk factors. Plan for next call: self management-monitor for post op red flags and follow up appointment-attend as directed  CTN provided contact information for future needs. Goals Addressed                 This Visit's Progress     Prevent complications post hospitalization. 04/28/22   Diet- N/V/D, constipation, surg specific diet-protein to promote wound healing   Pain control/medication- stool softener while taking narcotics/monitor for BM every 2-3 days. Currently taking only tylenol for pain, would like to avoid hydrocodone but has available.  Patient will monitor BG levels and contact PCP if remains consistently elevated <200. BG was high in hospital 200-300s.  He has returned to home insulin regimen and notes BG is back to his BL, this am 130s.  Activity- *per instruction, no strenuous activity, no heavy lifting, no driving while taking pain medication. Limit sitting to 45 min- hour. Walk frequently.  Post op red flags- increased redness, swelling, drainage or pain at incision site. Temp greater than 101. s/s DVT/PE i.e calf pain/swelling, increased SOB.

## 2022-05-11 ENCOUNTER — PATIENT OUTREACH (OUTPATIENT)
Dept: CASE MANAGEMENT | Age: 72
End: 2022-05-11

## 2022-05-11 NOTE — PROGRESS NOTES
Care Transitions Follow Up Call    Care Transition Nurse (CTN) contacted the patient by telephone to follow up after admission on 4/27. CTN provided contact information for future needs. Plan for follow-up call in 7-10 days based on severity of symptoms and risk factors. Plan for next call: self management-continue kegel exercises     Goals Addressed                 This Visit's Progress     Prevent complications post hospitalization. 04/28/22   Diet- N/V/D, constipation, surg specific diet-protein to promote wound healing   Pain control/medication- stool softener while taking narcotics/monitor for BM every 2-3 days. Currently taking only tylenol for pain, would like to avoid hydrocodone but has available.  Patient will monitor BG levels and contact PCP if remains consistently elevated <200. BG was high in hospital 200-300s. He has returned to home insulin regimen and notes BG is back to his BL, this am 130s.  Activity- *per instruction, no strenuous activity, no heavy lifting, no driving while taking pain medication. Limit sitting to 45 min- hour. Walk frequently.  Post op red flags- increased redness, swelling, drainage or pain at incision site. Temp greater than 101. s/s DVT/PE i.e calf pain/swelling, increased SOB. 05/11/22  Attend urology-Dr. Douglas Bell follow up last week. Vargas removed, started kegel exercises as directed. Saw nephro yesterday good report. BG levels back to his BL since returning home. No high readings.

## 2022-05-27 ENCOUNTER — PATIENT OUTREACH (OUTPATIENT)
Dept: CASE MANAGEMENT | Age: 72
End: 2022-05-27

## 2022-05-27 NOTE — PROGRESS NOTES
Patient has graduated from the Transitions of Care Coordination  program on 5/27/2022. Patient/family has the ability to self-manage at this time Care management goals have been completed. Patient was not referred to the [facility] team for further management. Goals Addressed                 This Visit's Progress     COMPLETED: Prevent complications post hospitalization. 04/28/22   Diet- denies N/V/D, constipation, surg specific diet-lean protein to promote wound healing   Pain control/medication- stool softener while taking narcotics/monitor for BM every 2-3 days. Currently taking only tylenol for pain, would like to avoid hydrocodone but has available.  Patient will monitor BG levels and contact PCP if remains consistently elevated <200. BG was high in hospital 200-300s. He has returned to home insulin regimen and notes BG is back to his BL, this am 130s.  Activity- *per instruction, no strenuous activity, no heavy lifting, no driving while taking pain medication. Limit sitting to 45 min- hour. Walk frequently.  Post op red flags- increased redness, swelling, drainage or pain at incision site. Temp greater than 101. s/s DVT/PE i.e calf pain/swelling, increased SOB. 05/11/22  Attend urology-Dr. Anne-Marie Henderson follow up last week. Vargas removed, started kegel exercises as directed. Saw nephro yesterday good report. BG levels back to his BL since returning home. No high readings. Patient has Care Transition Nurse's contact information for any further questions, concerns, or needs. Patients upcoming visits:  No future appointments.

## 2022-10-27 ENCOUNTER — APPOINTMENT (OUTPATIENT)
Dept: CT IMAGING | Age: 72
DRG: 556 | End: 2022-10-27
Attending: STUDENT IN AN ORGANIZED HEALTH CARE EDUCATION/TRAINING PROGRAM
Payer: MEDICARE

## 2022-10-27 ENCOUNTER — APPOINTMENT (OUTPATIENT)
Dept: MRI IMAGING | Age: 72
DRG: 556 | End: 2022-10-27
Attending: FAMILY MEDICINE
Payer: MEDICARE

## 2022-10-27 ENCOUNTER — HOSPITAL ENCOUNTER (INPATIENT)
Age: 72
LOS: 1 days | Discharge: HOME | DRG: 556 | End: 2022-10-28
Attending: STUDENT IN AN ORGANIZED HEALTH CARE EDUCATION/TRAINING PROGRAM | Admitting: FAMILY MEDICINE
Payer: MEDICARE

## 2022-10-27 DIAGNOSIS — Z86.73 HISTORY OF STROKE: ICD-10-CM

## 2022-10-27 DIAGNOSIS — R29.898 RIGHT LEG WEAKNESS: Primary | ICD-10-CM

## 2022-10-27 PROBLEM — I63.9 CVA (CEREBRAL VASCULAR ACCIDENT) (HCC): Status: ACTIVE | Noted: 2022-10-27

## 2022-10-27 LAB
ALBUMIN SERPL-MCNC: 3.2 G/DL (ref 3.5–5)
ALBUMIN/GLOB SERPL: 0.8 {RATIO} (ref 1.1–2.2)
ALP SERPL-CCNC: 89 U/L (ref 45–117)
ALT SERPL-CCNC: 18 U/L (ref 12–78)
ANION GAP SERPL CALC-SCNC: 6 MMOL/L (ref 5–15)
APPEARANCE UR: CLEAR
AST SERPL-CCNC: 11 U/L (ref 15–37)
BACTERIA URNS QL MICRO: NEGATIVE /HPF
BASOPHILS # BLD: 0 K/UL (ref 0–0.1)
BASOPHILS NFR BLD: 0 % (ref 0–1)
BILIRUB SERPL-MCNC: 0.7 MG/DL (ref 0.2–1)
BILIRUB UR QL: NEGATIVE
BUN SERPL-MCNC: 16 MG/DL (ref 6–20)
BUN/CREAT SERPL: 14 (ref 12–20)
CALCIUM SERPL-MCNC: 8.8 MG/DL (ref 8.5–10.1)
CHLORIDE SERPL-SCNC: 107 MMOL/L (ref 97–108)
CK SERPL-CCNC: 116 U/L (ref 39–308)
CO2 SERPL-SCNC: 23 MMOL/L (ref 21–32)
COLOR UR: ABNORMAL
CREAT SERPL-MCNC: 1.17 MG/DL (ref 0.7–1.3)
DIFFERENTIAL METHOD BLD: ABNORMAL
EOSINOPHIL # BLD: 0 K/UL (ref 0–0.4)
EOSINOPHIL NFR BLD: 0 % (ref 0–7)
EPITH CASTS URNS QL MICRO: ABNORMAL /LPF
ERYTHROCYTE [DISTWIDTH] IN BLOOD BY AUTOMATED COUNT: 12.8 % (ref 11.5–14.5)
GLOBULIN SER CALC-MCNC: 4 G/DL (ref 2–4)
GLUCOSE BLD STRIP.AUTO-MCNC: 159 MG/DL (ref 65–117)
GLUCOSE SERPL-MCNC: 123 MG/DL (ref 65–100)
GLUCOSE UR STRIP.AUTO-MCNC: NEGATIVE MG/DL
HCT VFR BLD AUTO: 46.7 % (ref 36.6–50.3)
HGB BLD-MCNC: 15.5 G/DL (ref 12.1–17)
HGB UR QL STRIP: ABNORMAL
HYALINE CASTS URNS QL MICRO: ABNORMAL /LPF (ref 0–5)
IMM GRANULOCYTES # BLD AUTO: 0 K/UL (ref 0–0.04)
IMM GRANULOCYTES NFR BLD AUTO: 0 % (ref 0–0.5)
KETONES UR QL STRIP.AUTO: ABNORMAL MG/DL
LEUKOCYTE ESTERASE UR QL STRIP.AUTO: NEGATIVE
LYMPHOCYTES # BLD: 0.8 K/UL (ref 0.8–3.5)
LYMPHOCYTES NFR BLD: 11 % (ref 12–49)
MAGNESIUM SERPL-MCNC: 1.8 MG/DL (ref 1.6–2.4)
MCH RBC QN AUTO: 29.8 PG (ref 26–34)
MCHC RBC AUTO-ENTMCNC: 33.2 G/DL (ref 30–36.5)
MCV RBC AUTO: 89.6 FL (ref 80–99)
MONOCYTES # BLD: 0.7 K/UL (ref 0–1)
MONOCYTES NFR BLD: 9 % (ref 5–13)
NEUTS SEG # BLD: 5.8 K/UL (ref 1.8–8)
NEUTS SEG NFR BLD: 80 % (ref 32–75)
NITRITE UR QL STRIP.AUTO: NEGATIVE
NRBC # BLD: 0 K/UL (ref 0–0.01)
NRBC BLD-RTO: 0 PER 100 WBC
PH UR STRIP: 7 [PH] (ref 5–8)
PLATELET # BLD AUTO: 129 K/UL (ref 150–400)
PMV BLD AUTO: 11.9 FL (ref 8.9–12.9)
POTASSIUM SERPL-SCNC: 3.9 MMOL/L (ref 3.5–5.1)
PROT SERPL-MCNC: 7.2 G/DL (ref 6.4–8.2)
PROT UR STRIP-MCNC: 30 MG/DL
RBC # BLD AUTO: 5.21 M/UL (ref 4.1–5.7)
RBC #/AREA URNS HPF: ABNORMAL /HPF (ref 0–5)
RBC MORPH BLD: ABNORMAL
SERVICE CMNT-IMP: ABNORMAL
SODIUM SERPL-SCNC: 136 MMOL/L (ref 136–145)
SP GR UR REFRACTOMETRY: >1.03
UROBILINOGEN UR QL STRIP.AUTO: 1 EU/DL (ref 0.2–1)
WBC # BLD AUTO: 7.3 K/UL (ref 4.1–11.1)
WBC URNS QL MICRO: ABNORMAL /HPF (ref 0–4)

## 2022-10-27 PROCEDURE — 82550 ASSAY OF CK (CPK): CPT

## 2022-10-27 PROCEDURE — 74011250636 HC RX REV CODE- 250/636: Performed by: STUDENT IN AN ORGANIZED HEALTH CARE EDUCATION/TRAINING PROGRAM

## 2022-10-27 PROCEDURE — 74011250637 HC RX REV CODE- 250/637: Performed by: STUDENT IN AN ORGANIZED HEALTH CARE EDUCATION/TRAINING PROGRAM

## 2022-10-27 PROCEDURE — 65270000046 HC RM TELEMETRY

## 2022-10-27 PROCEDURE — 36415 COLL VENOUS BLD VENIPUNCTURE: CPT

## 2022-10-27 PROCEDURE — 70450 CT HEAD/BRAIN W/O DYE: CPT

## 2022-10-27 PROCEDURE — 70551 MRI BRAIN STEM W/O DYE: CPT

## 2022-10-27 PROCEDURE — 81001 URINALYSIS AUTO W/SCOPE: CPT

## 2022-10-27 PROCEDURE — 80053 COMPREHEN METABOLIC PANEL: CPT

## 2022-10-27 PROCEDURE — 0042T CT CODE NEURO PERF W CBF: CPT

## 2022-10-27 PROCEDURE — 4A03X5D MEASUREMENT OF ARTERIAL FLOW, INTRACRANIAL, EXTERNAL APPROACH: ICD-10-PCS | Performed by: STUDENT IN AN ORGANIZED HEALTH CARE EDUCATION/TRAINING PROGRAM

## 2022-10-27 PROCEDURE — 99285 EMERGENCY DEPT VISIT HI MDM: CPT

## 2022-10-27 PROCEDURE — 74011000636 HC RX REV CODE- 636: Performed by: STUDENT IN AN ORGANIZED HEALTH CARE EDUCATION/TRAINING PROGRAM

## 2022-10-27 PROCEDURE — 74011250637 HC RX REV CODE- 250/637: Performed by: FAMILY MEDICINE

## 2022-10-27 PROCEDURE — 82962 GLUCOSE BLOOD TEST: CPT

## 2022-10-27 PROCEDURE — 74011250636 HC RX REV CODE- 250/636: Performed by: FAMILY MEDICINE

## 2022-10-27 PROCEDURE — 83735 ASSAY OF MAGNESIUM: CPT

## 2022-10-27 PROCEDURE — 70496 CT ANGIOGRAPHY HEAD: CPT

## 2022-10-27 PROCEDURE — 85025 COMPLETE CBC W/AUTO DIFF WBC: CPT

## 2022-10-27 RX ORDER — ACETAMINOPHEN 325 MG/1
650 TABLET ORAL
Status: DISCONTINUED | OUTPATIENT
Start: 2022-10-27 | End: 2022-10-28 | Stop reason: HOSPADM

## 2022-10-27 RX ORDER — FAMOTIDINE 20 MG/1
20 TABLET, FILM COATED ORAL
Status: DISCONTINUED | OUTPATIENT
Start: 2022-10-28 | End: 2022-10-28 | Stop reason: HOSPADM

## 2022-10-27 RX ORDER — INSULIN GLARGINE 100 [IU]/ML
25 INJECTION, SOLUTION SUBCUTANEOUS
Status: DISCONTINUED | OUTPATIENT
Start: 2022-10-28 | End: 2022-10-28 | Stop reason: HOSPADM

## 2022-10-27 RX ORDER — MAGNESIUM SULFATE 100 %
4 CRYSTALS MISCELLANEOUS AS NEEDED
Status: DISCONTINUED | OUTPATIENT
Start: 2022-10-27 | End: 2022-10-28 | Stop reason: HOSPADM

## 2022-10-27 RX ORDER — GUAIFENESIN 100 MG/5ML
81 LIQUID (ML) ORAL DAILY
Status: DISCONTINUED | OUTPATIENT
Start: 2022-10-27 | End: 2022-10-28 | Stop reason: HOSPADM

## 2022-10-27 RX ORDER — AMLODIPINE BESYLATE 5 MG/1
10 TABLET ORAL DAILY
Status: DISCONTINUED | OUTPATIENT
Start: 2022-10-27 | End: 2022-10-28 | Stop reason: HOSPADM

## 2022-10-27 RX ORDER — ACETAMINOPHEN 325 MG/1
650 TABLET ORAL
Status: COMPLETED | OUTPATIENT
Start: 2022-10-27 | End: 2022-10-27

## 2022-10-27 RX ORDER — HEPARIN SODIUM 5000 [USP'U]/ML
5000 INJECTION, SOLUTION INTRAVENOUS; SUBCUTANEOUS EVERY 8 HOURS
Status: DISCONTINUED | OUTPATIENT
Start: 2022-10-27 | End: 2022-10-28 | Stop reason: HOSPADM

## 2022-10-27 RX ORDER — ATORVASTATIN CALCIUM 10 MG/1
10 TABLET, FILM COATED ORAL
Status: DISCONTINUED | OUTPATIENT
Start: 2022-10-27 | End: 2022-10-28 | Stop reason: HOSPADM

## 2022-10-27 RX ORDER — CLOPIDOGREL 300 MG/1
300 TABLET, FILM COATED ORAL ONCE
Status: COMPLETED | OUTPATIENT
Start: 2022-10-27 | End: 2022-10-27

## 2022-10-27 RX ORDER — SODIUM CHLORIDE 9 MG/ML
75 INJECTION, SOLUTION INTRAVENOUS CONTINUOUS
Status: DISCONTINUED | OUTPATIENT
Start: 2022-10-27 | End: 2022-10-28 | Stop reason: HOSPADM

## 2022-10-27 RX ORDER — INSULIN LISPRO 100 [IU]/ML
INJECTION, SOLUTION INTRAVENOUS; SUBCUTANEOUS
Status: DISCONTINUED | OUTPATIENT
Start: 2022-10-27 | End: 2022-10-28 | Stop reason: HOSPADM

## 2022-10-27 RX ORDER — LISINOPRIL 20 MG/1
40 TABLET ORAL
Status: DISCONTINUED | OUTPATIENT
Start: 2022-10-28 | End: 2022-10-28 | Stop reason: HOSPADM

## 2022-10-27 RX ORDER — SODIUM CHLORIDE 9 MG/ML
1000 INJECTION, SOLUTION INTRAVENOUS ONCE
Status: COMPLETED | OUTPATIENT
Start: 2022-10-27 | End: 2022-10-27

## 2022-10-27 RX ADMIN — ACETAMINOPHEN 650 MG: 325 TABLET ORAL at 14:39

## 2022-10-27 RX ADMIN — IOPAMIDOL 20 ML: 755 INJECTION, SOLUTION INTRAVENOUS at 12:38

## 2022-10-27 RX ADMIN — HEPARIN SODIUM 5000 UNITS: 5000 INJECTION INTRAVENOUS; SUBCUTANEOUS at 18:00

## 2022-10-27 RX ADMIN — IOPAMIDOL 100 ML: 755 INJECTION, SOLUTION INTRAVENOUS at 12:38

## 2022-10-27 RX ADMIN — ATORVASTATIN CALCIUM 10 MG: 10 TABLET, FILM COATED ORAL at 21:00

## 2022-10-27 RX ADMIN — SODIUM CHLORIDE 75 ML/HR: 9 INJECTION, SOLUTION INTRAVENOUS at 17:28

## 2022-10-27 RX ADMIN — AMLODIPINE BESYLATE 10 MG: 5 TABLET ORAL at 18:00

## 2022-10-27 RX ADMIN — SODIUM CHLORIDE 1000 ML: 9 INJECTION, SOLUTION INTRAVENOUS at 09:57

## 2022-10-27 RX ADMIN — ACETAMINOPHEN 650 MG: 325 TABLET ORAL at 21:00

## 2022-10-27 RX ADMIN — CLOPIDOGREL BISULFATE 300 MG: 300 TABLET, FILM COATED ORAL at 14:40

## 2022-10-27 RX ADMIN — ASPIRIN 81 MG 81 MG: 81 TABLET ORAL at 18:00

## 2022-10-27 NOTE — H&P
[address] [facility] Adult  Hospitalist Group  History and Physical    Date of Service:  10/27/2022  Primary Care Provider: Sherren Sprinkles, MD  Source of information: The patient and Chart review    Chief Complaint: Extremity Weakness      History of Presenting Illness:   Albaro Laughlin. is a 67 y.o. male who presents with fatigue. History was probably obtained from the patient, patient reports that he got his COVID booster yesterday and woke up around 7 AM with weakness so he could not get out of bed, patient reports that he has had a stroke in the past and he has some right lower extremity weakness secondary to that, patient reports that he could not get up and walk today got concerned and came to the hospital in the ER patient was evaluated by telemetry neurologist and patient was requested to be worked up for possible CVA. The patient denies any headache, blurry vision, sore throat, trouble swallowing, trouble with speech, chest pain, SOB, cough, fever, chills, N/V/D, abd pain, urinary symptoms, constipation, recent travels, sick contacts,  falls, injuries, rashes, contact with COVID-19 diagnosed patients, hematemesis, melena, hemoptysis, hematuria, rashes, denies starting any new medications and denies any other concerns or problems besides as mentioned above. REVIEW OF SYSTEMS:  A comprehensive review of systems was negative except for that written in the History of Present Illness.      Past Medical History:   Diagnosis Date    Chronic pain     Diabetes ([address]     GERD (gastroesophageal reflux disease)     H/O echocardiogram 2014    50-55%  small apical defect    History of kidney infection     Hypercholesterolemia     Hypertension     Prostate cancer ([address]     PUD (peptic ulcer disease)     Rheumatoid arthritis involving right hand ([address]     Sepsis ([address]     Stomach ulcer     Stroke ([address] 2014    RIGHT LEG HEAVY AFTER ALOT OF EXERCISE      Past Surgical History:   Procedure Laterality Date    COLONOSCOPY N/A 11/11/2020    COLONOSCOPY performed by Noris Ellison MD at [facility] ENDOSCOPY    HX COLONOSCOPY      HX ORTHOPAEDIC Right     THREE TITANIUM SCREWS IN LEG    HX UROLOGICAL      DC PROSTATE BIOPSY, NEEDLE, SATURATION SAMPLING       Prior to Admission medications    Medication Sig Start Date End Date Taking? Authorizing Provider   lovastatin (MEVACOR) 10 mg tablet Take  by mouth nightly. Provider, Historical   glipiZIDE (GLUCOTROL) 10 mg tablet Take 10 mg by mouth every morning. Provider, Historical   famotidine (Pepcid) 20 mg tablet Take 20 mg by mouth every morning. Provider, Historical   amLODIPine (NORVASC) 10 mg tablet Take  by mouth daily. Provider, Historical   insulin glargine (LANTUS) 100 unit/mL injection 25 Units by SubCUTAneous route daily (with breakfast). Provider, Historical   insulin glargine (LANTUS) 100 unit/mL injection 20 Units by SubCUTAneous route daily (with dinner). Provider, Historical   lisinopril (PRINIVIL, ZESTRIL) 40 mg tablet Take 40 mg by mouth every morning. Provider, Historical   insulin aspart (NOVOLOG) 100 unit/mL injection 16 Units by SubCUTAneous route Before breakfast, lunch, and dinner. Indications: TYPE 2 DIABETES MELLITUS    Other, MD [place]     No Known Allergies   Family History   Problem Relation Age of Onset    OSTEOARTHRITIS Mother     Diabetes Mother     Heart Disease Father     Heart Surgery Father     OSTEOARTHRITIS Brother     Elevated Lipids Brother     Heart Disease Brother     Hypertension Brother     Heart Attack Brother     Heart Surgery Brother     No Known Problems Brother     No Known Problems Son     No Known Problems Son     No Known Problems Son     No Known Problems Daughter     Anesth Problems Neg Hx       Social History:  reports that he has quit smoking. He has a 10.00 pack-year smoking history. He has never used smokeless tobacco. He reports that he does not drink alcohol and does not use drugs. Family and social history were personally reviewed, all pertinent and relevant details are outlined as above. Objective:   Visit Vitals  BP (!) 152/75 (BP 1 Location: Left upper arm, BP Patient Position: At rest)   Pulse (!) 58   Temp 98 °F (36.7 °C)   Resp 16   Ht 5' 9\" (1.753 m)   Wt 87.1 kg (192 lb)   SpO2 96%   BMI 28.35 kg/m²      O2 Device: None (Room air)    PHYSICAL EXAM:   General: Alert x oriented x 3, awake, n  HEENT: PEERL, EOMI, moist mucus membranes  Neck: Supple, no JVD, no meningeal signs  Chest: Clear to auscultation bilaterally   CVS: RRR, S1 S2 heard, no murmurs/rubs/gallops  Abd: Soft, non-tender, non-distended, +bowel sounds   Ext: No clubbing, no cyanosis, no edema  Neuro/Psych: Pleasant mood and affect, CN 2-12 grossly intact, 4/5 strength right lower extremity 4/5 strength left lower extremity, 5/5 strength bilateral upper extremity  Cap refill: Brisk, less than 3 seconds  Pulses: 2+, symmetric in all extremities  Skin: Warm, dry, without rashes or lesions    Data Review: All diagnostic labs and studies have been reviewed. Abnormal Labs Reviewed   CBC WITH AUTOMATED DIFF - Abnormal; Notable for the following components:       Result Value    PLATELET 253 (*)     NEUTROPHILS 80 (*)     LYMPHOCYTES 11 (*)     All other components within normal limits   METABOLIC PANEL, COMPREHENSIVE - Abnormal; Notable for the following components:    Glucose 123 (*)     AST (SGOT) 11 (*)     Albumin 3.2 (*)     A-G Ratio 0.8 (*)     All other components within normal limits       All Micro Results       None            IMAGING:   CTA CODE NEURO HEAD AND NECK W CONT   Final Result   CTA Head:   1. No evidence of flow-limiting stenosis or aneurysm. Scattered atherosclerotic   disease as above. CTA Neck:   1. No evidence of flow-limiting stenosis. 2. Mild stenosis (less than 50% by NASCET criteria) of the proximal right   internal carotid artery. CT Brain Perfusion:   1.  No acute abnormality. CT CODE NEURO PERF W CBF   Final Result   CTA Head:   1. No evidence of flow-limiting stenosis or aneurysm. Scattered atherosclerotic   disease as above. CTA Neck:   1. No evidence of flow-limiting stenosis. 2. Mild stenosis (less than 50% by NASCET criteria) of the proximal right   internal carotid artery. CT Brain Perfusion:   1. No acute abnormality. CT HEAD WO CONT   Final Result   No acute abnormality. MRI BRAIN WO CONT    (Results Pending)        ECG/ECHO:    Results for orders placed or performed during the hospital encounter of 04/12/22   EKG, 12 LEAD, INITIAL   Result Value Ref Range    Ventricular Rate 50 BPM    Atrial Rate 50 BPM    P-R Interval 308 ms    QRS Duration 72 ms    Q-T Interval 440 ms    QTC Calculation (Bezet) 401 ms    Calculated P Axis 39 degrees    Calculated R Axis 19 degrees    Calculated T Axis -7 degrees    Diagnosis       Sinus bradycardia with 1st degree AV block  Otherwise normal ECG  When compared with ECG of 23-JUL-2021 12:54,  No significant change was found  Confirmed by Neisha Estrada ([id number]) on 4/12/2022 1:09:53 PM          Assessment:   Given the patient's current clinical presentation, there is a high level of concern for decompensation if discharged from the emergency department. Complex decision making was performed, which includes reviewing the patient's available past medical records, laboratory results, and imaging studies.     Lower extremity weakness  Diabetes mellitus type 2  Hypercholesterolemia  Hypertension  Plan:     Patient will be admitted on telemetry bed, unclear etiology for weakness, low suspicion for central process, continue home medication, patient will be on aspirin and statin, PT OT consult, check UA, likely related to receiving COVID-vaccine yesterday, if symptoms persist may consider further intervention or diagnostics  Sliding-scale not insulin, Accu-Cheks, diet control, close monitoring  Continue statin  Optimize blood pressure control        DIET: ADULT DIET Regular; 4 carb choices (60 gm/meal); Low Fat/Low Chol/High Fiber/ASTON; Low Sodium (2 gm)   ISOLATION PRECAUTIONS: There are currently no Active Isolations  CODE STATUS: Full Code   DVT PROPHYLAXIS: SCDs  FUNCTIONAL STATUS PRIOR TO HOSPITALIZATION: Fully active and ambulatory; able to carry on all self-care without restriction. Ambulatory status/function: By self   EARLY MOBILITY ASSESSMENT: Recommend routine ambulation while hospitalized with the assistance of nursing staff  ANTICIPATED DISCHARGE: 24-48 hours. ANTICIPATED DISPOSITION: Home      Signed By: Sandy Barraza MD     October 27, 2022         Please note that this dictation may have been completed with Dragon, the computer voice recognition software. Quite often unanticipated grammatical, syntax, homophones, and other interpretive errors are inadvertently transcribed by the computer software. Please disregard these errors. Please excuse any errors that have escaped final proofreading.

## 2022-10-27 NOTE — PROGRESS NOTES
Problem: Falls - Risk of  Goal: *Absence of Falls  Description: Document Christy Skill Fall Risk and appropriate interventions in the flowsheet.   Outcome: Progressing Towards Goal  Note: Fall Risk Interventions:  Mobility Interventions: Bed/chair exit alarm, Patient to call before getting OOB         Medication Interventions: Bed/chair exit alarm, Patient to call before getting OOB, Teach patient to arise slowly                   Problem: Patient Education: Go to Patient Education Activity  Goal: Patient/Family Education  Outcome: Progressing Towards Goal     Problem: Patient Education: Go to Patient Education Activity  Goal: Patient/Family Education  Outcome: Progressing Towards Goal     Problem: TIA/CVA Stroke: 0-24 hours  Goal: Off Pathway (Use only if patient is Off Pathway)  Outcome: Progressing Towards Goal  Goal: Activity/Safety  Outcome: Progressing Towards Goal  Goal: Consults, if ordered  Outcome: Progressing Towards Goal  Goal: Diagnostic Test/Procedures  Outcome: Progressing Towards Goal  Goal: Nutrition/Diet  Outcome: Progressing Towards Goal  Goal: Discharge Planning  Outcome: Progressing Towards Goal  Goal: Medications  Outcome: Progressing Towards Goal  Goal: Respiratory  Outcome: Progressing Towards Goal  Goal: Treatments/Interventions/Procedures  Outcome: Progressing Towards Goal  Goal: Minimize risk of bleeding post-thrombolytic infusion  Outcome: Progressing Towards Goal  Goal: Monitor for complications post-thrombolytic infusion  Outcome: Progressing Towards Goal  Goal: Psychosocial  Outcome: Progressing Towards Goal  Goal: *Hemodynamically stable  Outcome: Progressing Towards Goal  Goal: *Neurologically stable  Description: Absence of additional neurological deficits    Outcome: Progressing Towards Goal  Goal: *Verbalizes anxiety and depression are reduced or absent  Outcome: Progressing Towards Goal  Goal: *Absence of Signs of Aspiration on Current Diet  Outcome: Progressing Towards Goal  Goal: *Absence of deep venous thrombosis signs and symptoms(Stroke Metric)  Outcome: Progressing Towards Goal  Goal: *Ability to perform ADLs and demonstrates progressive mobility and function  Outcome: Progressing Towards Goal  Goal: *Stroke education started(Stroke Metric)  Outcome: Progressing Towards Goal  Goal: *Dysphagia screen performed(Stroke Metric)  Outcome: Progressing Towards Goal  Goal: *Rehab consulted(Stroke Metric)  Outcome: Progressing Towards Goal     Problem: TIA/CVA Stroke: Day 2 Until Discharge  Goal: Off Pathway (Use only if patient is Off Pathway)  Outcome: Progressing Towards Goal  Goal: Activity/Safety  Outcome: Progressing Towards Goal  Goal: Diagnostic Test/Procedures  Outcome: Progressing Towards Goal  Goal: Nutrition/Diet  Outcome: Progressing Towards Goal  Goal: Discharge Planning  Outcome: Progressing Towards Goal  Goal: Medications  Outcome: Progressing Towards Goal  Goal: Respiratory  Outcome: Progressing Towards Goal  Goal: Treatments/Interventions/Procedures  Outcome: Progressing Towards Goal  Goal: Psychosocial  Outcome: Progressing Towards Goal  Goal: *Verbalizes anxiety and depression are reduced or absent  Outcome: Progressing Towards Goal  Goal: *Absence of aspiration  Outcome: Progressing Towards Goal  Goal: *Absence of deep venous thrombosis signs and symptoms(Stroke Metric)  Outcome: Progressing Towards Goal  Goal: *Optimal pain control at patient's stated goal  Outcome: Progressing Towards Goal  Goal: *Tolerating diet  Outcome: Progressing Towards Goal  Goal: *Ability to perform ADLs and demonstrates progressive mobility and function  Outcome: Progressing Towards Goal  Goal: *Stroke education continued(Stroke Metric)  Outcome: Progressing Towards Goal     Problem: Ischemic Stroke: Discharge Outcomes  Goal: *Verbalizes anxiety and depression are reduced or absent  Outcome: Progressing Towards Goal  Goal: *Verbalize understanding of risk factor modification(Stroke Metric)  Outcome: Progressing Towards Goal  Goal: *Hemodynamically stable  Outcome: Progressing Towards Goal  Goal: *Absence of aspiration pneumonia  Outcome: Progressing Towards Goal  Goal: *Aware of needed dietary changes  Outcome: Progressing Towards Goal  Goal: *Verbalize understanding of prescribed medications including anti-coagulants, anti-lipid, and/or anti-platelets(Stroke Metric)  Outcome: Progressing Towards Goal  Goal: *Tolerating diet  Outcome: Progressing Towards Goal  Goal: *Aware of follow-up diagnostics related to anticoagulants  Outcome: Progressing Towards Goal  Goal: *Ability to perform ADLs and demonstrates progressive mobility and function  Outcome: Progressing Towards Goal  Goal: *Absence of DVT(Stroke Metric)  Outcome: Progressing Towards Goal  Goal: *Absence of aspiration  Outcome: Progressing Towards Goal  Goal: *Optimal pain control at patient's stated goal  Outcome: Progressing Towards Goal  Goal: *Home safety concerns addressed  Outcome: Progressing Towards Goal  Goal: *Describes available resources and support systems  Outcome: Progressing Towards Goal  Goal: *Verbalizes understanding of activation of EMS(911) for stroke symptoms(Stroke Metric)  Outcome: Progressing Towards Goal  Goal: *Understands and describes signs and symptoms to report to providers(Stroke Metric)  Outcome: Progressing Towards Goal  Goal: *Neurolgocially stable (absence of additional neurological deficits)  Outcome: Progressing Towards Goal  Goal: *Verbalizes importance of follow-up with primary care physician(Stroke Metric)  Outcome: Progressing Towards Goal  Goal: *Smoking cessation discussed,if applicable(Stroke Metric)  Outcome: Progressing Towards Goal  Goal: *Depression screening completed(Stroke Metric)  Outcome: Progressing Towards Goal

## 2022-10-27 NOTE — ED PROVIDER NOTES
Patient is a 60-year-old male history of RA, peptic ulcer disease, prostate cancer, prior stroke with right-sided deficits presenting today secondary to generalized fatigue. Patient reports that he got his COVID booster yesterday and this morning he woke up around 7 AM and was so weak that he could not get out of bed. When asked if it was both legs or one side that was bothering him more he said that he just felt generally weak. Complaining of dizziness, headache and myalgias as well as pain to the right arm but no weakness to the upper extremities. No code stroke initiated as patient tells me he has weakness in both legs with known worse weakness on the right from higher stroke.        Past Medical History:   Diagnosis Date    Chronic pain     Diabetes ([address]     GERD (gastroesophageal reflux disease)     H/O echocardiogram 2014    50-55%  small apical defect    History of kidney infection     Hypercholesterolemia     Hypertension     Prostate cancer ([address]     PUD (peptic ulcer disease)     Rheumatoid arthritis involving right hand ([address]     Sepsis ([address]     Stomach ulcer     Stroke ([address] 2014    RIGHT LEG HEAVY AFTER ALOT OF EXERCISE       Past Surgical History:   Procedure Laterality Date    COLONOSCOPY N/A 11/11/2020    COLONOSCOPY performed by Ivanna Chavez MD at [facility] ENDOSCOPY    HX COLONOSCOPY      HX ORTHOPAEDIC Right     THREE TITANIUM SCREWS IN LEG    HX UROLOGICAL      [place] PROSTATE BIOPSY, NEEDLE, SATURATION SAMPLING           Family History:   Problem Relation Age of Onset    OSTEOARTHRITIS Mother     Diabetes Mother     Heart Disease Father     Heart Surgery Father     OSTEOARTHRITIS Brother     Elevated Lipids Brother     Heart Disease Brother     Hypertension Brother     Heart Attack Brother     Heart Surgery Brother     No Known Problems Brother     No Known Problems Son     No Known Problems Son     No Known Problems Son     No Known Problems Daughter     Anesth Problems Neg Hx        Social History     Socioeconomic History    Marital status:      Spouse name: Not on file    Number of children: Not on file    Years of education: Not on file    Highest education level: Not on file   Occupational History    Not on file   Tobacco Use    Smoking status: Former     Packs/day: 0.25     Years: 40.00     Pack years: 10.00     Types: Cigarettes    Smokeless tobacco: Never   Vaping Use    Vaping Use: Never used   Substance and Sexual Activity    Alcohol use: No    Drug use: No    Sexual activity: Not on file   Other Topics Concern    Not on file   Social History Narrative    Not on file     Social Determinants of Health     Financial Resource Strain: Not on file   Food Insecurity: Not on file   Transportation Needs: Not on file   Physical Activity: Not on file   Stress: Not on file   Social Connections: Not on file   Intimate Partner Violence: Not on file   Housing Stability: Not on file         ALLERGIES: Patient has no known allergies. Review of Systems   Constitutional:  Positive for fatigue. Negative for chills and fever. HENT:  Negative for congestion and sore throat. Eyes:  Negative for pain and redness. Respiratory:  Negative for cough and shortness of breath. Cardiovascular:  Negative for chest pain and palpitations. Gastrointestinal:  Negative for abdominal pain, diarrhea, nausea and vomiting. Genitourinary:  Negative for frequency and hematuria. Musculoskeletal:  Positive for myalgias. Negative for back pain and neck pain. Skin:  Negative for rash and wound. Neurological:  Positive for dizziness, weakness and headaches. Hematological:  Does not bruise/bleed easily. Vitals:    10/27/22 0927   BP: 138/87   Pulse: 98   Resp: 18   Temp: 99.4 °F (37.4 °C)   SpO2: 99%   Weight: 87.1 kg (192 lb)   Height: 5' 9\" (1.753 m)            Physical Exam  Vitals and nursing note reviewed. Constitutional:       General: He is not in acute distress.      Appearance: He is well-developed. HENT:      Head: Normocephalic and atraumatic. Eyes:      Conjunctiva/sclera: Conjunctivae normal.      Pupils: Pupils are equal, round, and reactive to light. Cardiovascular:      Rate and Rhythm: Normal rate and regular rhythm. Heart sounds: Normal heart sounds. No murmur heard. No friction rub. No gallop. Comments: Equal radial, dp, and pt pulses  Pulmonary:      Effort: Pulmonary effort is normal. No respiratory distress. Breath sounds: Normal breath sounds. No wheezing or rales. Abdominal:      General: Bowel sounds are normal. There is no distension. Palpations: Abdomen is soft. Tenderness: There is no abdominal tenderness. There is no guarding or rebound. Musculoskeletal:         General: Normal range of motion. Cervical back: Normal range of motion and neck supple. Skin:     General: Skin is warm and dry. Capillary Refill: Capillary refill takes less than 2 seconds. Findings: No rash. Neurological:      Mental Status: He is alert and oriented to person, place, and time.       Comments: CN II-XII tested and intact  Speech is clear and fluid  Tongue protrusion normal  5/5 b/l strength with shoulder/elbow flexion and extension  Equal  strength  Weakness of b/l lower extremities, R slightly > L  Symmetric dorsi and plantar-flexion of feet  Sensation intact in face and throughout all 4 extremities  No dysmetria   No truncal ataxia               MDM    ED Course as of 10/27/22 1510   Thu Oct 27, 2022   0949 EKG time 9:46 AM  Normal sinus rhythm with significant sinus arrhythmia, first-degree AV block, normal axis, narrow QRS, normal QTC and nonspecific ST-T wave changes without ST elevations or depressions [CC]      ED Course User Index  [CC] Dat Jain DO       Procedures    On reevaluation patient now tells me that he thinks that his right leg is a lot weaker than before, stating that it is about 60% whereas it typically is about 95% of the strength of the left leg. He says that he thinks that this is how it was when he woke up this morning. At this point I initiated a code stroke but initially he had told me that it was both legs bothering him which is why the initial code stroke was not called out. I discussed with neurology and since he woke up with the symptoms he is not a candidate for tPA, no LVO noted. He was already given aspirin at home and neurology recommended loading with Plavix. Patient to be admitted for further management. Perfect Serve Consult for Admission  3:10 PM    ED Room Number: ER08/08  Patient Name and age: Felipe Talavera. 67 y.o.  male  Working Diagnosis:   1. Right leg weakness    2. History of stroke        COVID-19 Suspicion:  no  Sepsis present:  no  Reassessment needed: no  Code Status:  Full Code  Readmission: no  Isolation Requirements:  no  Recommended Level of Care:  telemetry  Department:[facility] Adult ED - 21   Other: Patient is a 70-year-old male presenting today with right lower extremity weakness in addition to generalized fatigue after getting a COVID-vaccine yesterday. Has history of stroke. Evaluated by neurology who was concerned about the leg weakness and wants to treat as stroke. Loaded with aspirin and Plavix (aspirin at home, Plavix here). CT/CTA negative. Requesting mission for further management.

## 2022-10-27 NOTE — ED TRIAGE NOTES
Pt stated that he was unable to get out of bed without assistance this am. New onset of a headache and felt dizzy when he sat up. Wife noticed that he has a shuffling gait which was not present before.

## 2022-10-27 NOTE — ED NOTES
TRANSFER - OUT REPORT:    Verbal report given to (name) on Augustine Layton.  being transferred to NSTU(unit) for routine progression of care       Report consisted of patients Situation, Background, Assessment and   Recommendations(SBAR). Information from the following report(s) SBAR and ED Summary was reviewed with the receiving nurse. Lines:   Peripheral IV 10/27/22 Right Forearm (Active)        Opportunity for questions and clarification was provided.       Patient transported with:   TRANSPORT

## 2022-10-27 NOTE — PROGRESS NOTES
Bedside and Verbal shift change report given to Mai Daniel RN (oncoming nurse) by MYLA Arce (offgoing nurse). Report included the following information SBAR, Kardex, ED Summary, MAR, and Recent Results.

## 2022-10-28 VITALS
RESPIRATION RATE: 22 BRPM | HEART RATE: 75 BPM | HEIGHT: 69 IN | SYSTOLIC BLOOD PRESSURE: 154 MMHG | WEIGHT: 192 LBS | DIASTOLIC BLOOD PRESSURE: 70 MMHG | OXYGEN SATURATION: 96 % | BODY MASS INDEX: 28.44 KG/M2 | TEMPERATURE: 97.8 F

## 2022-10-28 LAB
ALBUMIN SERPL-MCNC: 2.9 G/DL (ref 3.5–5)
ALBUMIN/GLOB SERPL: 0.9 {RATIO} (ref 1.1–2.2)
ALP SERPL-CCNC: 74 U/L (ref 45–117)
ALT SERPL-CCNC: 17 U/L (ref 12–78)
ANION GAP SERPL CALC-SCNC: 7 MMOL/L (ref 5–15)
AST SERPL-CCNC: 11 U/L (ref 15–37)
BASOPHILS # BLD: 0 K/UL (ref 0–0.1)
BASOPHILS NFR BLD: 1 % (ref 0–1)
BILIRUB SERPL-MCNC: 0.5 MG/DL (ref 0.2–1)
BUN SERPL-MCNC: 19 MG/DL (ref 6–20)
BUN/CREAT SERPL: 17 (ref 12–20)
CALCIUM SERPL-MCNC: 8.5 MG/DL (ref 8.5–10.1)
CHLORIDE SERPL-SCNC: 107 MMOL/L (ref 97–108)
CO2 SERPL-SCNC: 24 MMOL/L (ref 21–32)
CREAT SERPL-MCNC: 1.13 MG/DL (ref 0.7–1.3)
DIFFERENTIAL METHOD BLD: ABNORMAL
EOSINOPHIL # BLD: 0 K/UL (ref 0–0.4)
EOSINOPHIL NFR BLD: 1 % (ref 0–7)
ERYTHROCYTE [DISTWIDTH] IN BLOOD BY AUTOMATED COUNT: 13 % (ref 11.5–14.5)
GLOBULIN SER CALC-MCNC: 3.3 G/DL (ref 2–4)
GLUCOSE BLD STRIP.AUTO-MCNC: 185 MG/DL (ref 65–117)
GLUCOSE BLD STRIP.AUTO-MCNC: 187 MG/DL (ref 65–117)
GLUCOSE SERPL-MCNC: 184 MG/DL (ref 65–100)
HCT VFR BLD AUTO: 42.6 % (ref 36.6–50.3)
HGB BLD-MCNC: 14.2 G/DL (ref 12.1–17)
IMM GRANULOCYTES # BLD AUTO: 0 K/UL (ref 0–0.04)
IMM GRANULOCYTES NFR BLD AUTO: 1 % (ref 0–0.5)
LYMPHOCYTES # BLD: 1 K/UL (ref 0.8–3.5)
LYMPHOCYTES NFR BLD: 22 % (ref 12–49)
MCH RBC QN AUTO: 30.1 PG (ref 26–34)
MCHC RBC AUTO-ENTMCNC: 33.3 G/DL (ref 30–36.5)
MCV RBC AUTO: 90.4 FL (ref 80–99)
MONOCYTES # BLD: 0.6 K/UL (ref 0–1)
MONOCYTES NFR BLD: 13 % (ref 5–13)
NEUTS SEG # BLD: 2.7 K/UL (ref 1.8–8)
NEUTS SEG NFR BLD: 62 % (ref 32–75)
NRBC # BLD: 0 K/UL (ref 0–0.01)
NRBC BLD-RTO: 0 PER 100 WBC
PLATELET # BLD AUTO: 120 K/UL (ref 150–400)
PMV BLD AUTO: 12.7 FL (ref 8.9–12.9)
POTASSIUM SERPL-SCNC: 3.9 MMOL/L (ref 3.5–5.1)
PROT SERPL-MCNC: 6.2 G/DL (ref 6.4–8.2)
RBC # BLD AUTO: 4.71 M/UL (ref 4.1–5.7)
SERVICE CMNT-IMP: ABNORMAL
SERVICE CMNT-IMP: ABNORMAL
SODIUM SERPL-SCNC: 138 MMOL/L (ref 136–145)
WBC # BLD AUTO: 4.3 K/UL (ref 4.1–11.1)

## 2022-10-28 PROCEDURE — 85025 COMPLETE CBC W/AUTO DIFF WBC: CPT

## 2022-10-28 PROCEDURE — 97161 PT EVAL LOW COMPLEX 20 MIN: CPT

## 2022-10-28 PROCEDURE — 36415 COLL VENOUS BLD VENIPUNCTURE: CPT

## 2022-10-28 PROCEDURE — 74011250637 HC RX REV CODE- 250/637: Performed by: FAMILY MEDICINE

## 2022-10-28 PROCEDURE — 82962 GLUCOSE BLOOD TEST: CPT

## 2022-10-28 PROCEDURE — 80053 COMPREHEN METABOLIC PANEL: CPT

## 2022-10-28 PROCEDURE — 74011636637 HC RX REV CODE- 636/637: Performed by: FAMILY MEDICINE

## 2022-10-28 PROCEDURE — 74011250636 HC RX REV CODE- 250/636: Performed by: FAMILY MEDICINE

## 2022-10-28 RX ADMIN — HEPARIN SODIUM 5000 UNITS: 5000 INJECTION INTRAVENOUS; SUBCUTANEOUS at 09:06

## 2022-10-28 RX ADMIN — AMLODIPINE BESYLATE 10 MG: 5 TABLET ORAL at 09:05

## 2022-10-28 RX ADMIN — LISINOPRIL 40 MG: 20 TABLET ORAL at 07:21

## 2022-10-28 RX ADMIN — ASPIRIN 81 MG 81 MG: 81 TABLET ORAL at 09:05

## 2022-10-28 RX ADMIN — Medication 2 UNITS: at 09:05

## 2022-10-28 RX ADMIN — FAMOTIDINE 20 MG: 20 TABLET, FILM COATED ORAL at 07:21

## 2022-10-28 RX ADMIN — Medication 2 UNITS: at 12:27

## 2022-10-28 RX ADMIN — INSULIN GLARGINE 25 UNITS: 100 INJECTION, SOLUTION SUBCUTANEOUS at 09:05

## 2022-10-28 RX ADMIN — HEPARIN SODIUM 5000 UNITS: 5000 INJECTION INTRAVENOUS; SUBCUTANEOUS at 00:39

## 2022-10-28 NOTE — PROGRESS NOTES
PHYSICAL THERAPY EVALUATION/DISCHARGE  Patient: Veronica Pretty (73 y.o. male)  Date: 10/28/2022  Primary Diagnosis: CVA (cerebral vascular accident) [facility]) [I63.9]       Precautions:          ASSESSMENT  Based on the objective data described below, the patient presents at his baseline functional mobility s/p admission for stroke work up. MRI shows no acute abnormality. Pt is reporting improvement in his symptoms. Functionally he is independent with transfers and ambulation w/o a device. His gait is mildy altered from a prior injury though steady with no overt loss of balance. His strength, ROM ,and coordination are functional.  Completed education. Additional acute therapy is not indicated. Will sign off. Functional Outcome Measure: The patient scored 25/28 on the Tinetti outcome measure which is indicative of low fall risk. Further skilled acute physical therapy is not indicated at this time. PLAN :  Recommendation for discharge: (in order for the patient to meet his/her long term goals)  No skilled physical therapy/ follow up rehabilitation needs identified at this time. This discharge recommendation:  Has not yet been discussed the attending provider and/or case management    IF patient discharges home will need the following DME: patient owns DME required for discharge       SUBJECTIVE:   Patient stated I couldn't have done this yesterday.     OBJECTIVE DATA SUMMARY:   HISTORY:    Past Medical History:   Diagnosis Date    Chronic pain     Diabetes ([address]     GERD (gastroesophageal reflux disease)     H/O echocardiogram 2014    50-55%  small apical defect    History of kidney infection     Hypercholesterolemia     Hypertension     Prostate cancer ([address]     PUD (peptic ulcer disease)     Rheumatoid arthritis involving right hand ([address]     Sepsis ([address]     Stomach ulcer     Stroke ([address] 2014    RIGHT LEG HEAVY AFTER ALOT OF EXERCISE     Past Surgical History:   Procedure Laterality Date    COLONOSCOPY N/A 11/11/2020    COLONOSCOPY performed by Lucian Boles MD at [facility] ENDOSCOPY    HX COLONOSCOPY      HX ORTHOPAEDIC Right     THREE TITANIUM SCREWS IN LEG    HX UROLOGICAL      MS PROSTATE BIOPSY, NEEDLE, SATURATION SAMPLING         Prior level of function: independent, denies recent falls though his wife reminded him f a fall down the steps a couple of wks ago  Personal factors and/or comorbidities impacting plan of care:     Home Situation  Home Environment: Private residence  # Steps to Enter: 3  One/Two Story Residence: Two story  Living Alone: No  Support Systems: Spouse/Significant Other, Child(jada)  Patient Expects to be Discharged to[de-identified] Home  Current DME Used/Available at Home: Other (comment) (per chart, owns cane, RW, WC; uses tall walking stick for walks in the commumity)    EXAMINATION/PRESENTATION/DECISION MAKING:   Critical Behavior:  Neurologic State: Alert  Orientation Level: Oriented X4  Cognition: Appropriate decision making, Appropriate for age attention/concentration, Appropriate safety awareness, Follows commands  Safety/Judgement: Awareness of environment    Range Of Motion:  AROM: Within functional limits                       Strength:    Strength: Within functional limits                    Tone & Sensation:   Tone: Normal              Sensation: Intact               Coordination:  Coordination: Within functional limits  Vision:      Functional Mobility:  Bed Mobility:  Rolling: Independent  Supine to Sit: Independent  Sit to Supine: Independent  Scooting: Independent  Transfers:  Sit to Stand: Independent  Stand to Sit: Independent                       Balance:   Sitting: Intact; Without support  Standing: Intact; Without support  Ambulation/Gait Training:  Distance (ft): 300 Feet (ft)  Assistive Device: Gait belt  Ambulation - Level of Assistance: Independent  Gait Description (WDL): Exceptions to WDL  Gait Abnormalities: Other (decreased right knee flexion and slight limp reported to be baseline s/p injury w/ surgery)                          Education:   Completed FAST education. Pt indicated understanding. Functional Measure:  Tinetti test:    Sitting Balance: 1  Arises: 2  Attempts to Rise: 2  Immediate Standing Balance: 2  Standing Balance: 1  Nudged: 2  Eyes Closed: 1  Turn 360 Degrees - Continuous/Discontinuous: 1  Turn 360 Degrees - Steady/Unsteady: 1  Sitting Down: 2  Balance Score: 15 Balance total score  Indication of Gait: 1  R Step Length/Height: 1  L Step Length/Height: 1  R Foot Clearance: 1  L Foot Clearance: 1  Step Symmetry: 0  Step Continuity: 1  Path: 2  Trunk: 2  Walking Time: 0  Gait Score: 10 Gait total score  Total Score: 25/28 Overall total score         Tinetti Tool Score Risk of Falls  <19 = High Fall Risk  19-24 = Moderate Fall Risk  25-28 = Low Fall Risk  Tinetti ME. Performance-Oriented Assessment of Mobility Problems in Elderly Patients. Paul 66; [id number].  (Scoring Description: PT Bulletin Feb. 10, 1993)    Older adults: Prabha Montoya et al, 2009; n = 1000 [facility] elderly evaluated with ABC, CHRISTIAN, ADL, and IADL)  · Mean CHRISTIAN score for males aged 69-68 years = 26.21(3.40)  · Mean CHRISTIAN score for females age 69-68 years = 25.16(4.30)  · Mean CHRISTIAN score for males over 80 years = 23.29(6.02)  · Mean CHRISTIAN score for females over 80 years = 17.20(8.32)            Physical Therapy Evaluation Charge Determination   History Examination Presentation Decision-Making   MEDIUM  Complexity : 1-2 comorbidities / personal factors will impact the outcome/ POC  MEDIUM Complexity : 3 Standardized tests and measures addressing body structure, function, activity limitation and / or participation in recreation  LOW Complexity : Stable, uncomplicated  Other outcome measures Tinetti 25/28  LOW       Based on the above components, the patient evaluation is determined to be of the following complexity level: LOW     Pain Rating:  None indicated    Activity Tolerance:   Good and SpO2 stable on RA    Vitals:    10/28/22 1211 10/28/22 1218   BP: (!) 151/66 (!) 154/70   BP 1 Location: Left upper arm Right upper arm   BP Patient Position: Sitting; At rest Sitting  Comment: after walking 300 ft   Pulse: 76 75   Temp:     Resp: 24 22         After treatment patient left in no apparent distress:   Sitting EOB eating lunch, Call bell within reach, Caregiver / family present (son and wife), and RN aware    COMMUNICATION/EDUCATION:   The patients plan of care was discussed with: Registered nurse. Fall prevention education was provided and the patient/caregiver indicated understanding., Patient/family have participated as able in goal setting and plan of care. , and Patient/family agree to work toward stated goals and plan of care.     Thank you for this referral.  Graeme Martinez, PT   Time Calculation: 21 mins

## 2022-10-28 NOTE — PROGRESS NOTES
A Spiritual Care Partner Volunteer visited patient in [address] [place] [address] on 10/28/2022.   Documented by:  Chaplain Wu MDiv, MS, [facility]

## 2022-10-28 NOTE — DISCHARGE SUMMARY
Discharge Summary       PATIENT ID: Felipe Roberts MRN: [id number]   YOB: 1950    DATE OF ADMISSION: 10/27/2022  9:13 AM    DATE OF DISCHARGE: 10/28/2022  PRIMARY CARE PROVIDER: Angelina Millan MD     ATTENDING PHYSICIAN: Alden Grant DO   DISCHARGING PROVIDER: Alden Grant DO    To contact this individual call [phone] and ask the  to page. If unavailable ask to be transferred the Adult Hospitalist Department. CONSULTATIONS: None    PROCEDURES/SURGERIES: * No surgery found *    ADMISSION SUMMARY AND HOSPITAL COURSE:   \"71 y.o. male who presents with fatigue. History was probably obtained from the patient, patient reports that he got his COVID booster yesterday and woke up around 7 AM with weakness so he could not get out of bed, patient reports that he has had a stroke in the past and he has some right lower extremity weakness secondary to that, patient reports that he could not get up and walk today got concerned and came to the hospital in the ER patient was evaluated by telemetry neurologist and patient was requested to be worked up for possible CVA. \"    MRI brain:  IMPRESSION  Mild chronic microvascular ischemic change and mild to moderate temporal  predominant cerebral atrophy. There is no intracranial mass, hemorrhage or evidence of acute infarction. No acute intracranial process is demonstrated. CTA Head:   1. No evidence of flow-limiting stenosis or aneurysm. Scattered atherosclerotic   disease as above. CTA Neck:   1. No evidence of flow-limiting stenosis. 2. Mild stenosis (less than 50% by NASCET criteria) of the proximal right   internal carotid artery. CT Brain Perfusion:   1. No acute abnormality.                 DISCHARGE DIAGNOSES / PLAN:      Lower extremity weakness- resolved  -suspect due to COVID booster vaccine  -MRI brain negative  -cleared by therapy    Diabetes mellitus type 2  Hypercholesterolemia  Hypertension  -continue home medications    BMI: Body mass index is 28.35 kg/m². . This patient: Meets criteria for overweight given BMI >/= 25 and < 30 due to excess calories/nutritional. Weight loss and lifestyle modifications should be encouraged as an outpatient. PENDING TEST RESULTS:   At the time of discharge the following test results are still pending: none     ADDITIONAL CARE RECOMMENDATIONS:   Please schedule follow up appointment within 7-10 days of discharge. Resume home medications. NOTIFY YOUR PHYSICIAN FOR ANY OF THE FOLLOWING:   Fever over 101 degrees for 24 hours. Chest pain, shortness of breath, fever, chills, nausea, vomiting, diarrhea, change in mentation, falling, weakness, bleeding. Severe pain or pain not relieved by medications, as well as any other signs or symptoms that you may have questions about. FOLLOW UP APPOINTMENTS:    Follow-up Information       Follow up With Specialties Details Why Sidney Mena MD Internal Medicine Physician   [address] [place] [address],[address]  [phone]               DISCHARGE MEDICATIONS:  Current Discharge Medication List        CONTINUE these medications which have NOT CHANGED    Details   lovastatin (MEVACOR) 10 mg tablet Take  by mouth nightly. glipiZIDE (GLUCOTROL) 10 mg tablet Take 10 mg by mouth every morning. famotidine (Pepcid) 20 mg tablet Take 20 mg by mouth every morning. amLODIPine (NORVASC) 10 mg tablet Take  by mouth daily. !! insulin glargine (LANTUS) 100 unit/mL injection 25 Units by SubCUTAneous route daily (with breakfast). !! insulin glargine (LANTUS) 100 unit/mL injection 20 Units by SubCUTAneous route daily (with dinner). lisinopril (PRINIVIL, ZESTRIL) 40 mg tablet Take 40 mg by mouth every morning. insulin aspart (NOVOLOG) 100 unit/mL injection 16 Units by SubCUTAneous route Before breakfast, lunch, and dinner.  Indications: TYPE 2 DIABETES MELLITUS       ! ! - Potential duplicate medications found. Please discuss with provider. DISPOSITION:  x  Home With:   OT  PT  HH  RN       Long term SNF/Inpatient Rehab    Independent/assisted living    Hospice    Other:       PATIENT CONDITION AT DISCHARGE:     Functional status    Poor     Deconditioned    x Independent      Cognition   x  Lucid     Forgetful     Dementia      Catheters/lines (plus indication)    Vargas     PICC     PEG    x None      Code status   x  Full code     DNR      PHYSICAL EXAMINATION AT DISCHARGE:  General:          Alert, cooperative, no distress, appears stated age. Lungs:             Clear to auscultation bilaterally. No Wheezing or Rhonchi. No rales. Heart:              Regular  rhythm,  No  murmur   No edema  Abdomen:        Soft, non-tender. Not distended. Bowel sounds normal  Extremities:     No cyanosis. No clubbing,                            Skin turgor normal, Capillary refill normal  Skin:                Not pale. Not Jaundiced  No rashes   Psych:             Not anxious or agitated.   Neurologic:      Alert, moves all extremities, answers questions appropriately and responds to commands       CHRONIC MEDICAL DIAGNOSES:  Problem List as of 10/28/2022 Date Reviewed: 2/13/2016            Codes Class Noted - Resolved    CVA (cerebral vascular accident) [facility]) ICD-10-CM: I63.9  ICD-9-CM: 434.91  10/27/2022 - Present        Prostate cancer ([facility] ICD-10-CM: C61  ICD-9-CM: 185  4/26/2022 - Present        Metabolic encephalopathy TFK-97-OS: G93.41  ICD-9-CM: 348.31  7/20/2021 - Present        H/O: stroke with residual effects (Chronic) ICD-10-CM: I69.30  ICD-9-CM: 438.9  2/10/2016 - Present        Fracture of femoral neck, right ([facility] ICD-10-CM: S72.001A  ICD-9-CM: 820.8  2/10/2016 - Present        CAD (coronary artery disease) (Chronic) ICD-10-CM: I25.10  ICD-9-CM: 414.00  2/10/2016 - Present    Overview Signed 2/10/2016  3:36 PM by Cathy Hoskins high cholesterol             CVA (cerebral infarction) ICD-10-CM: I63.9  ICD-9-CM: 434.91  4/19/2014 - Present        DM (diabetes mellitus) ([facility] (Chronic) ICD-10-CM: E11.9  ICD-9-CM: 250.00  4/19/2014 - Present        Other and unspecified hyperlipidemia (Chronic) ICD-10-CM: E78.5  ICD-9-CM: 272.4  4/19/2014 - Present           Greater than 30 minutes were spent with the patient on counseling and coordination of care    Signed:   [facility],   10/28/2022  1:14 PM

## 2022-10-28 NOTE — PROGRESS NOTES
Orders received, chart reviewed and patient evaluated and discharged by physical therapy. Pending progression with skilled acute physical therapy, recommend:  No skilled physical therapy/ follow up rehabilitation needs identified at this time. Recommend with nursing OOB to chair 3x/day and walking daily in the [place]arris. Thank you for completing as able in order to maintain patient strength, endurance and independence. Full evaluation to follow.

## 2022-10-28 NOTE — DISCHARGE INSTRUCTIONS
Discharge Instructions       PATIENT ID: Augustine Beltrán MRN: [id number]   YOB: 1950    DATE OF ADMISSION: 10/27/2022  DATE OF DISCHARGE: 10/28/2022    ATTENDING PHYSICIAN: Meryle Maus, DO   DISCHARGING PROVIDER: Meryle Maus, DO    To contact this individual call [phone] and ask the  to page. If unavailable ask to be transferred the Adult Hospitalist Department. DISCHARGE DIAGNOSES     Weakness following COVID 19 vaccine    CONSULTATIONS: none    PROCEDURES/SURGERIES: * No surgery found *    PENDING TEST RESULTS:   At the time of discharge the following test results are still pending: none    FOLLOW UP APPOINTMENTS:   Please schedule follow up appointment within 7-10 days of discharge    ADDITIONAL CARE RECOMMENDATIONS:    Resume home medications    DISCHARGE MEDICATIONS:   See Medication Reconciliation Form    It is important that you take the medication exactly as they are prescribed. Keep your medication in the bottles provided by the pharmacist and keep a list of the medication names, dosages, and times to be taken in your wallet. Do not take other medications without consulting your doctor. NOTIFY YOUR PHYSICIAN FOR ANY OF THE FOLLOWING:   Fever over 101 degrees for 24 hours. Chest pain, shortness of breath, fever, chills, nausea, vomiting, diarrhea, change in mentation, falling, weakness, bleeding. Severe pain or pain not relieved by medications. Or, any other signs or symptoms that you may have questions about.         Signed:   Meryle Maus, DO  10/28/2022  1:03 PM

## 2022-10-28 NOTE — PROGRESS NOTES
Transition of Care Plan  RUR- Low 7%  DISPOSITION: Home with spouse  F/U with PCP/Specialist    Transport: Spouse, Nena Drummonder    Reason for Admission:  fatigue                      RUR Score:          7%           Plan for utilizing home health:      Hx of home health with [facility], PT eval pending    PCP: First and Last name: Sarah Beth Royal MD     Name of Practice:    Are you a current patient: Yes/No: Yes   Approximate date of last visit: Oct 14   Can you participate in a virtual visit with your PCP:                     Current Advanced Directive/Advance Care Plan: Full Code      Healthcare Decision Maker:   Click here to complete [place] including selection of the Healthcare Decision Maker Relationship (ie \"Primary\")             Primary Decision MakerDebo Field - [phone]    Secondary Decision Maker: Marian Guerrier - [facility] - [phone]                  Transition of Care Plan:                      CM met with patient and spouse, Nena Vernon at bedside to introduce self and role. Living situation: lives with spouse in 2 story home, 3 steps to enter, 12 steps to 2nd floor  ADLs: indpendent  DME: cane, RW, DAVID  Previous IPR/SNF: [facility]  Previous home health: [facility]  Demographics: confirmed, Medicare A&B and [employer] insured  [address] point of contact: Fabio Iglesias #[phone]    CM to follow patient progress and assist as recommended with [facility] plan. Care Management Interventions  PCP Verified by CM: Yes  Palliative Care Criteria Met (RRAT>21 & CHF Dx)?: No  Mode of Transport at Discharge:  Other (see comment) (spouse)  MyChart Signup: No  Discharge Durable Medical Equipment: No  Health Maintenance Reviewed: Yes  Physical Therapy Consult: Yes  Occupational Therapy Consult: No  Speech Therapy Consult: No  Support Systems: Spouse/Significant Other  Confirm Follow Up Transport: Family  The Plan for Transition of Care is Related to the Following Treatment Goals : home  Name of the Patient Representative Who was Provided with a Choice of Provider and Agrees with the Discharge Plan: patient  Discharge Location  Patient Expects to be Discharged to[de-identified] Home    The program assesses the family and/or caregiver's readiness, willingness, and ability to provide or support and self-management activities for the patient as needed. BRANDI Nichole.

## 2022-10-31 ENCOUNTER — PATIENT OUTREACH (OUTPATIENT)
Dept: CASE MANAGEMENT | Age: 72
End: 2022-10-31

## 2022-11-01 NOTE — PROGRESS NOTES
Care Transitions Outreach Attempt    Call within 2 business days of discharge: Yes   Attempted to reach patient for transitions of care follow up. Unable to reach patient. Patient: Arthur Pedraza Patient : 1950 MRN: [id number]    Last Discharge  Street       Date Complaint Diagnosis Description Type Department Provider    10/27/22 Extremity Weakness Right leg weakness . .. ED to Hosp-Admission (Discharged) (ADMIT) [id number] Héctor Monk M, DO; SUSHANT Martinez.. Was this an external facility discharge? No       Noted following upcoming appointments from discharge chart review:   [facility] follow up appointment(s): No future appointments. Non-[facility] follow up appointment(s): PCP--pt dec;lined per message from practice     Goals        Prevent complications post hospitalization. 10/31/22  CTN unable to reach patient on phone, LM on  requesting return call. Per chart notes, all neuro testing negative for CVA, IP PT did not recommend further therapy. Labs WNL, no new meds. Pt recommended to follow up with PCP.--cielo    22  CTN unable to reach patient or spouse today--no mychart active. CTN received message from MD office that patient declines [facility] appt at this time.  CTN will close [facility], no further outreach scheduled--cielo

## 2022-12-02 ENCOUNTER — ANESTHESIA EVENT (OUTPATIENT)
Dept: SURGERY | Age: 72
End: 2022-12-02
Payer: MEDICARE

## 2022-12-02 RX ORDER — CHLORTHALIDONE 25 MG/1
25 TABLET ORAL DAILY
COMMUNITY

## 2022-12-02 RX ORDER — BACLOFEN 20 MG
1 TABLET ORAL DAILY
COMMUNITY

## 2022-12-02 RX ORDER — PANTOPRAZOLE SODIUM 40 MG/1
40 TABLET, DELAYED RELEASE ORAL DAILY
COMMUNITY

## 2022-12-02 RX ORDER — INSULIN LISPRO 100 [IU]/ML
18 INJECTION, SOLUTION INTRAVENOUS; SUBCUTANEOUS
COMMUNITY

## 2022-12-02 RX ORDER — FUROSEMIDE 20 MG/1
20 TABLET ORAL
COMMUNITY

## 2022-12-02 RX ORDER — FOLIC ACID 1 MG/1
1 TABLET ORAL DAILY
COMMUNITY

## 2022-12-02 RX ORDER — CELECOXIB 200 MG/1
200 CAPSULE ORAL
COMMUNITY

## 2022-12-02 RX ORDER — MONTELUKAST SODIUM 10 MG/1
10 TABLET ORAL
COMMUNITY

## 2022-12-02 RX ORDER — GLIPIZIDE 10 MG/1
10 TABLET, FILM COATED, EXTENDED RELEASE ORAL DAILY
COMMUNITY

## 2022-12-02 RX ORDER — ASPIRIN 81 MG/1
81 TABLET ORAL DAILY
COMMUNITY

## 2022-12-02 RX ORDER — METHOTREXATE 2.5 MG/1
20 TABLET ORAL
COMMUNITY

## 2022-12-02 RX ORDER — LATANOPROST 50 UG/ML
1 SOLUTION/ DROPS OPHTHALMIC
COMMUNITY

## 2022-12-02 RX ORDER — TADALAFIL 5 MG/1
5 TABLET ORAL DAILY
COMMUNITY
Start: 2022-10-01

## 2022-12-02 NOTE — PERIOP NOTES
[facility]  Ambulatory Surgery Unit  Pre-operative Instructions    Surgery/Procedure Date  12/6            Tentative Arrival Time TBD      1. On the day of your surgery/procedure, please report to the Ambulatory Surgery Unit Registration Desk and sign in at your designated time. The Ambulatory Surgery Unit is located in [facility] on the [facility] side of the [facility] across from the [address] [place] [address]. Please have all of your health insurance cards, co-payment, and a photo ID.    **TWO adults may accompany you the day of the procedure. We have limited seating available. If our waiting room is at capacity, your ride may be asked to remain in their vehicle. No one under 15 is allowed in the waiting room. 2. You must have someone with you to drive you home, as you should not drive a car for 24 hours following anesthesia. Please make arrangements for a responsible adult friend or family member to stay with you for at least the first 24 hours after your surgery. 3. Do not have anything to eat or drink (including water, gum, mints, coffee, juice) after 11:59 PM  12/5. This may not apply to medications prescribed by your physician. (Please note below the special instructions with medications to take the morning of surgery, if applicable.)    4. We recommend you do not drink any alcoholic beverages for 24 hours before and after your surgery. 5. Contact your surgeons office for instructions on the following medications: non-steroidal anti-inflammatory drugs (i.e. Advil, Aleve), vitamins, and supplements. (Some surgeons will want you to stop these medications prior to surgery and others may allow you to take them)   **If you are currently taking Plavix, Coumadin, Aspirin and/or other blood-thinning agents, contact your surgeon for instructions. ** Your surgeon will partner with the physician prescribing these medications to determine if it is safe to stop or if you need to continue taking. Please do not stop taking these medications without instructions from your surgeon. 6. In an effort to help prevent surgical site infection, we ask that you shower with an anti-bacterial soap (i.e. Dial/Safeguard, or the soap provided to you at your preadmission testing appointment) on the morning of surgery, using a fresh towel after each shower. (Please begin this process with fresh bed linens.) Do not apply any lotions, powders, or deodorants after the shower on the day of your procedure. If applicable, please do not shave the operative site for 48 hours prior to surgery. 7. Wear comfortable clothes. Wear glasses instead of contacts. Do not bring any jewelry or money (other than copays or fees as instructed). Do not wear make-up, particularly mascara, the morning of your surgery. Do not wear nail polish, particularly if you are having foot /hand surgery. Wear your hair loose or down, no ponytails, buns, juana pins or clips. All body piercings must be removed. 8. You should understand that if you do not follow these instructions your surgery may be cancelled. If your physical condition changes (i.e. fever, cold or flu) please contact your surgeon as soon as possible. 9. It is important that you be on time. If a situation occurs where you may be late, or if you have any questions or problems, please call [phone].    10. Your surgery time may be subject to change. You will receive a phone call the day prior to surgery to confirm your arrival time. Special Instructions: Take all medications and inhalers, as prescribed, on the morning of surgery with a sip of water EXCEPT: Diabetic meds      Insulin Dependent Diabetic patients: Take your diabetic medications as prescribed the day before surgery. Hold all diabetic medications the day of surgery.     If you are scheduled to arrive for surgery after 8:00 AM, and your AM blood sugar is >200, please call Ambulatory Surgery. I understand a pre-operative phone call will be made to verify my surgery time. In the event that I am not available, I give permission for a message to be left on my answering service and/or with another person?       yes    Reviewed by phone with pt, verbalized understanding.     ___________________      ___________________      ________________  (Signature of Patient)          (Witness)                   (Date and Time)

## 2022-12-06 ENCOUNTER — ANESTHESIA (OUTPATIENT)
Dept: SURGERY | Age: 72
End: 2022-12-06
Payer: MEDICARE

## 2022-12-06 ENCOUNTER — HOSPITAL ENCOUNTER (OUTPATIENT)
Age: 72
Setting detail: OUTPATIENT SURGERY
Discharge: HOME | End: 2022-12-06
Attending: OPHTHALMOLOGY | Admitting: OPHTHALMOLOGY
Payer: MEDICARE

## 2022-12-06 VITALS
SYSTOLIC BLOOD PRESSURE: 187 MMHG | HEIGHT: 69 IN | DIASTOLIC BLOOD PRESSURE: 62 MMHG | BODY MASS INDEX: 27.99 KG/M2 | WEIGHT: 189 LBS | HEART RATE: 53 BPM | RESPIRATION RATE: 17 BRPM | TEMPERATURE: 97.7 F | OXYGEN SATURATION: 98 %

## 2022-12-06 LAB
GLUCOSE BLD STRIP.AUTO-MCNC: 97 MG/DL (ref 65–117)
SERVICE CMNT-IMP: NORMAL

## 2022-12-06 PROCEDURE — 74011250636 HC RX REV CODE- 250/636: Performed by: NURSE ANESTHETIST, CERTIFIED REGISTERED

## 2022-12-06 PROCEDURE — 76030000000 HC AMB SURG OR TIME 0.5 TO 1: Performed by: OPHTHALMOLOGY

## 2022-12-06 PROCEDURE — 77030028792 HC AGNT OPHTH DUOVISC ALCN -B: Performed by: OPHTHALMOLOGY

## 2022-12-06 PROCEDURE — V2632 POST CHMBR INTRAOCULAR LENS: HCPCS | Performed by: OPHTHALMOLOGY

## 2022-12-06 PROCEDURE — 2709999900 HC NON-CHARGEABLE SUPPLY: Performed by: OPHTHALMOLOGY

## 2022-12-06 PROCEDURE — 74011250636 HC RX REV CODE- 250/636: Performed by: OPHTHALMOLOGY

## 2022-12-06 PROCEDURE — 82962 GLUCOSE BLOOD TEST: CPT

## 2022-12-06 PROCEDURE — 77030006709 HC BLD OPHTH SSPC -B: Performed by: OPHTHALMOLOGY

## 2022-12-06 PROCEDURE — 74011000250 HC RX REV CODE- 250: Performed by: NURSE ANESTHETIST, CERTIFIED REGISTERED

## 2022-12-06 PROCEDURE — 74011250636 HC RX REV CODE- 250/636: Performed by: ANESTHESIOLOGY

## 2022-12-06 PROCEDURE — 76210000046 HC AMBSU PH II REC FIRST 0.5 HR: Performed by: OPHTHALMOLOGY

## 2022-12-06 PROCEDURE — 74011000250 HC RX REV CODE- 250

## 2022-12-06 PROCEDURE — 76060000061 HC AMB SURG ANES 0.5 TO 1 HR: Performed by: OPHTHALMOLOGY

## 2022-12-06 PROCEDURE — 74011000250 HC RX REV CODE- 250: Performed by: OPHTHALMOLOGY

## 2022-12-06 PROCEDURE — C1783 OCULAR IMP, AQUEOUS DRAIN DE: HCPCS | Performed by: OPHTHALMOLOGY

## 2022-12-06 PROCEDURE — 76210000040 HC AMBSU PH I REC FIRST 0.5 HR: Performed by: OPHTHALMOLOGY

## 2022-12-06 DEVICE — TRABECULAR MICRO-BYPASS STENT SYSTEM - LEFT
Type: IMPLANTABLE DEVICE | Site: EYE | Status: FUNCTIONAL
Brand: ISTENT

## 2022-12-06 DEVICE — LENS IOL SN60WF 21.5D: Type: IMPLANTABLE DEVICE | Site: EYE | Status: FUNCTIONAL

## 2022-12-06 RX ORDER — SODIUM CHLORIDE 0.9 % (FLUSH) 0.9 %
5-40 SYRINGE (ML) INJECTION EVERY 8 HOURS
Status: DISCONTINUED | OUTPATIENT
Start: 2022-12-06 | End: 2022-12-06 | Stop reason: HOSPADM

## 2022-12-06 RX ORDER — CYCLOPENTOLATE HYDROCHLORIDE 20 MG/ML
1 SOLUTION/ DROPS OPHTHALMIC
Status: COMPLETED | OUTPATIENT
Start: 2022-12-06 | End: 2022-12-06

## 2022-12-06 RX ORDER — PHENYLEPHRINE HYDROCHLORIDE 25 MG/ML
1 SOLUTION/ DROPS OPHTHALMIC
Status: COMPLETED | OUTPATIENT
Start: 2022-12-06 | End: 2022-12-06

## 2022-12-06 RX ORDER — DICLOFENAC SODIUM 1 MG/ML
1 SOLUTION/ DROPS OPHTHALMIC
Status: COMPLETED | OUTPATIENT
Start: 2022-12-06 | End: 2022-12-06

## 2022-12-06 RX ORDER — OXYCODONE AND ACETAMINOPHEN 5; 325 MG/1; MG/1
1 TABLET ORAL
Status: DISCONTINUED | OUTPATIENT
Start: 2022-12-06 | End: 2022-12-06 | Stop reason: HOSPADM

## 2022-12-06 RX ORDER — LIDOCAINE HYDROCHLORIDE 20 MG/ML
INJECTION, SOLUTION EPIDURAL; INFILTRATION; INTRACAUDAL; PERINEURAL AS NEEDED
Status: DISCONTINUED | OUTPATIENT
Start: 2022-12-06 | End: 2022-12-06 | Stop reason: HOSPADM

## 2022-12-06 RX ORDER — HYDROMORPHONE HYDROCHLORIDE 1 MG/ML
.2-.5 INJECTION, SOLUTION INTRAMUSCULAR; INTRAVENOUS; SUBCUTANEOUS ONCE
Status: DISCONTINUED | OUTPATIENT
Start: 2022-12-06 | End: 2022-12-06 | Stop reason: HOSPADM

## 2022-12-06 RX ORDER — LIDOCAINE HYDROCHLORIDE 10 MG/ML
0.1 INJECTION, SOLUTION EPIDURAL; INFILTRATION; INTRACAUDAL; PERINEURAL AS NEEDED
Status: DISCONTINUED | OUTPATIENT
Start: 2022-12-06 | End: 2022-12-06 | Stop reason: HOSPADM

## 2022-12-06 RX ORDER — SODIUM CHLORIDE, SODIUM LACTATE, POTASSIUM CHLORIDE, CALCIUM CHLORIDE 600; 310; 30; 20 MG/100ML; MG/100ML; MG/100ML; MG/100ML
25 INJECTION, SOLUTION INTRAVENOUS CONTINUOUS
Status: DISCONTINUED | OUTPATIENT
Start: 2022-12-06 | End: 2022-12-06 | Stop reason: HOSPADM

## 2022-12-06 RX ORDER — TROPICAMIDE 10 MG/ML
SOLUTION/ DROPS OPHTHALMIC
Status: COMPLETED
Start: 2022-12-06 | End: 2022-12-06

## 2022-12-06 RX ORDER — TROPICAMIDE 10 MG/ML
1 SOLUTION/ DROPS OPHTHALMIC
Status: COMPLETED | OUTPATIENT
Start: 2022-12-06 | End: 2022-12-06

## 2022-12-06 RX ORDER — NEOMYCIN SULFATE, POLYMYXIN B SULFATE, AND DEXAMETHASONE 3.5; 10000; 1 MG/G; [USP'U]/G; MG/G
OINTMENT OPHTHALMIC 4 TIMES DAILY
Status: DISCONTINUED | OUTPATIENT
Start: 2022-12-06 | End: 2022-12-06 | Stop reason: HOSPADM

## 2022-12-06 RX ORDER — FENTANYL CITRATE 50 UG/ML
25 INJECTION, SOLUTION INTRAMUSCULAR; INTRAVENOUS
Status: DISCONTINUED | OUTPATIENT
Start: 2022-12-06 | End: 2022-12-06 | Stop reason: HOSPADM

## 2022-12-06 RX ORDER — PROPOFOL 10 MG/ML
INJECTION, EMULSION INTRAVENOUS AS NEEDED
Status: DISCONTINUED | OUTPATIENT
Start: 2022-12-06 | End: 2022-12-06 | Stop reason: HOSPADM

## 2022-12-06 RX ORDER — SODIUM CHLORIDE 0.9 % (FLUSH) 0.9 %
5-40 SYRINGE (ML) INJECTION AS NEEDED
Status: DISCONTINUED | OUTPATIENT
Start: 2022-12-06 | End: 2022-12-06 | Stop reason: HOSPADM

## 2022-12-06 RX ORDER — SODIUM CHLORIDE 9 MG/ML
25 INJECTION, SOLUTION INTRAVENOUS ONCE
Status: COMPLETED | OUTPATIENT
Start: 2022-12-06 | End: 2022-12-06

## 2022-12-06 RX ORDER — DIPHENHYDRAMINE HYDROCHLORIDE 50 MG/ML
12.5 INJECTION, SOLUTION INTRAMUSCULAR; INTRAVENOUS AS NEEDED
Status: DISCONTINUED | OUTPATIENT
Start: 2022-12-06 | End: 2022-12-06 | Stop reason: HOSPADM

## 2022-12-06 RX ORDER — PROPARACAINE HYDROCHLORIDE 5 MG/ML
1 SOLUTION/ DROPS OPHTHALMIC
Status: COMPLETED | OUTPATIENT
Start: 2022-12-06 | End: 2022-12-06

## 2022-12-06 RX ORDER — TETRACAINE HYDROCHLORIDE 5 MG/ML
1 SOLUTION OPHTHALMIC
Status: ACTIVE | OUTPATIENT
Start: 2022-12-06 | End: 2022-12-06

## 2022-12-06 RX ADMIN — PROPARACAINE HYDROCHLORIDE 1 DROP: 5 SOLUTION/ DROPS OPHTHALMIC at 10:16

## 2022-12-06 RX ADMIN — TROPICAMIDE 1 DROP: 10 SOLUTION/ DROPS OPHTHALMIC at 10:22

## 2022-12-06 RX ADMIN — TROPICAMIDE 1 DROP: 10 SOLUTION/ DROPS OPHTHALMIC at 10:31

## 2022-12-06 RX ADMIN — PHENYLEPHRINE HYDROCHLORIDE 1 DROP: 25 SOLUTION/ DROPS OPHTHALMIC at 10:16

## 2022-12-06 RX ADMIN — CYCLOPENTOLATE HYDROCHLORIDE 1 DROP: 20 SOLUTION/ DROPS OPHTHALMIC at 10:16

## 2022-12-06 RX ADMIN — LIDOCAINE HYDROCHLORIDE 50 MG: 20 INJECTION, SOLUTION EPIDURAL; INFILTRATION; INTRACAUDAL; PERINEURAL at 11:13

## 2022-12-06 RX ADMIN — DICLOFENAC SODIUM 1 DROP: 1 SOLUTION/ DROPS OPHTHALMIC at 10:22

## 2022-12-06 RX ADMIN — CYCLOPENTOLATE HYDROCHLORIDE 1 DROP: 20 SOLUTION/ DROPS OPHTHALMIC at 10:31

## 2022-12-06 RX ADMIN — TROPICAMIDE 1 DROP: 10 SOLUTION/ DROPS OPHTHALMIC at 10:16

## 2022-12-06 RX ADMIN — CYCLOPENTOLATE HYDROCHLORIDE 1 DROP: 20 SOLUTION/ DROPS OPHTHALMIC at 10:22

## 2022-12-06 RX ADMIN — PHENYLEPHRINE HYDROCHLORIDE 1 DROP: 25 SOLUTION/ DROPS OPHTHALMIC at 10:22

## 2022-12-06 RX ADMIN — DICLOFENAC SODIUM 1 DROP: 1 SOLUTION/ DROPS OPHTHALMIC at 10:31

## 2022-12-06 RX ADMIN — SODIUM CHLORIDE, POTASSIUM CHLORIDE, SODIUM LACTATE AND CALCIUM CHLORIDE: 600; 310; 30; 20 INJECTION, SOLUTION INTRAVENOUS at 11:10

## 2022-12-06 RX ADMIN — PHENYLEPHRINE HYDROCHLORIDE 1 DROP: 25 SOLUTION/ DROPS OPHTHALMIC at 10:31

## 2022-12-06 RX ADMIN — PROPARACAINE HYDROCHLORIDE 1 DROP: 5 SOLUTION/ DROPS OPHTHALMIC at 10:31

## 2022-12-06 RX ADMIN — PROPOFOL 40 MG: 10 INJECTION, EMULSION INTRAVENOUS at 11:13

## 2022-12-06 RX ADMIN — SODIUM CHLORIDE 25 ML/HR: 9 INJECTION, SOLUTION INTRAVENOUS at 10:14

## 2022-12-06 RX ADMIN — DICLOFENAC SODIUM 1 DROP: 1 SOLUTION/ DROPS OPHTHALMIC at 10:16

## 2022-12-06 NOTE — OP NOTES
Operative Note  . Operative Report    Patient: Benjamín Alicia. MRN: [id number]  SSN: [id number]    YOB: 1950  Age: 67 y.o. Sex: male      Indications: The patient complains of painless progressive visual loss and reports difficulty with activities of daily living. Cataract surgery has been recommended to improve vision for activities of daily living, and the patient has elected to proceed. Date of Surgery: 12/6/2022     Preoperative Diagnosis: Combined forms of age-related cataract of left eye [H25.812]  Primary open angle glaucoma of left eye, mild stage [H40.1121]    Postoperative Diagnosis: Combined forms of age-related cataract of left eye [H25.812]  Primary open angle glaucoma of left eye, mild stage [H40.1121]    Surgeon(s) and Role:     * Rudolph Gill MD - Primary     Anesthesia:  MAC     Procedure: Procedure(s):  LEFT EYE PHACOEMULSIFICATION WITH INTRA OCULAR LENS IMPLANT WITH ISTENT (MAC WITH RETROBULBAR) 90310    Procedure in Detail:  After informed consent was obtained, the patient was brought into the operating suite. MAC with sedation and a retrobulbar block using a 50/50 mixture of lidocaine 2% and Marcaine 0.75% with added Amphatase was administered without complication. The patient was prepped and draped in the usual sterile ophthalmic fashion. A wire lid speculum was placed. A paracentesis was made using a 75 blade. The eye was filled with Provisc. A 2.8mm keratome was used to make a temporal clear corneal incision. A cystotome and Utrada forcep was used to make a continuous curvilinear capsulorrhexis without complication. Hydrodisection was performed until the nuclear rotated freely in the capsular bag. The cataract was removed using a two handed divide and conquer technique using a Joanna as a second instrument. Irrigation/aspiration was used to remove the remaining cortex. Capsular polish was used as needed. The bag was inflated using Provisc. An intraocular lens was placed into the bag without complication. The sulcus was inflated with Provisc. A gonioprism was used to visualize the trabecular meshwork. A left handed iSTENT was placed into the trabecular meshwork without complication. Irrigation/aspiration was used to remove the viscoelastic. The chamber was filled with BSS and the wound hydrated. The wound was found to be watertight. Subconjunctival injections of ancef and decadron were given. The lid speculum and drape was removed. The eye was dressed with ointment, eye pads, and eye shield. The patient was taken to the recover room having tolerated the procedure well. Findings:  None         Estimated Blood Loss:  Minimal    Specimens: * No specimens in log *                Implants:   Implant Name Type Inv.  Item Serial No.  Lot No. LRB No. Used Action   LENS IOL SN60WF 21.5D - [id number]  LENS IOL SN60WF 21.5D [id number] [facility]_ NA Left 1 Implanted   SHUNT EYE TRABECLAR VASHTI INVAS -- ISTENT - [id number]  SHUNT EYE TRABECLAR VASHTI INVAS -- ISTENT [id number] [id number] Left 1 Implanted              Complications:  None

## 2022-12-06 NOTE — BRIEF OP NOTE
Brief Postoperative Note    Patient: Gio Mejia YOB: 1950  MRN: [id number]    Date of Procedure: 12/6/2022     Pre-Op Diagnosis: Combined forms of age-related cataract of left eye [H25.812]  Primary open angle glaucoma of left eye, mild stage [H40.1121]    Post-Op Diagnosis: Same as preoperative diagnosis. Procedure(s):  LEFT EYE PHACOEMULSIFICATION WITH INTRA OCULAR LENS IMPLANT WITH ISTENT (MAC WITH RETROBULBAR) 60800    Surgeon(s):  Nuzhat Ponce MD    Surgical Assistant: None    Anesthesia: MAC     Estimated Blood Loss (mL): Minimal    Complications: None    Specimens: * No specimens in log *     Implants:   Implant Name Type Inv.  Item Serial No.  Lot No. LRB No. Used Action   LENS IOL SN60WF 21.5D - [id number]  LENS IOL SN60WF 21.5D [id number] [facility]_WD NA Left 1 Implanted   SHUNT EYE TRABECLAR VASHTI INVAS -- ISTENT - [id number]  SHUNT EYE TRABECLAR VASHTI INVAS -- ISTENT [id number] [id number] Left 1 Implanted       Drains: * No LDAs found *    Findings: none    Electronically Signed by Johana Campos MD on 12/6/2022 at 1:48 PM

## 2022-12-06 NOTE — PERIOP NOTES
Patient received to PACU, VSS. Patient awake and alert with no complaints of pain. Dressing to left eye intact. 1157: Discharge instructions given. Patient and wife verbalize understanding of instructions and follow up appointment. Patient BP elevated throughout periop course, 187/62 at discharge. Patient states he did not take BP med this am. Instructed patient to take BP meds and recheck BP upon arrival home. Patient verbalized understanding. Patient and wife state ready for discharge. IV removed. Patient discharged at this time by wheelchair with belongings, wife to provide transportation home.

## 2022-12-06 NOTE — ANESTHESIA PREPROCEDURE EVALUATION
Relevant Problems   NEUROLOGY   (+) CVA (cerebral vascular accident) ([facility] [address]      CARDIOVASCULAR   (+) CAD (coronary artery disease)      ENDOCRINE   (+) DM (diabetes mellitus) (HCC)      PERSONAL HX & FAMILY HX OF CANCER   (+) Prostate cancer (HCC)       Anesthetic History   No history of anesthetic complications            Review of Systems / Medical History  Patient summary reviewed, nursing notes reviewed and pertinent labs reviewed    Pulmonary  Within defined limits                 Neuro/Psych       CVA ( some mild weakness right leg)       Cardiovascular    Hypertension          Hyperlipidemia    Exercise tolerance: >4 METS     GI/Hepatic/Renal     GERD: well controlled      PUD     Endo/Other    Diabetes: well controlled    Arthritis and cancer     Other Findings   Comments: Cataracts    Rheumatoid arthritis  Prostate Cancer  Chronic pain         Physical Exam    Airway  Mallampati: II  TM Distance: 4 - 6 cm  Neck ROM: normal range of motion   Mouth opening: Normal     Cardiovascular    Rhythm: regular  Rate: normal         Dental    Dentition: Upper partial plate and Lower partial plate     Pulmonary  Breath sounds clear to auscultation               Abdominal  GI exam deferred       Other Findings            Anesthetic Plan    ASA: 3  Anesthesia type: MAC          Induction: Intravenous  Anesthetic plan and risks discussed with: Patient

## 2022-12-06 NOTE — PERIOP NOTES
Permission received to review discharge instructions and discuss private health information with wife and will have someone with them after discharge   Patient states that family/friend will be with them for at least 24 hours following today's procedure.   Warm blanket given to pt

## 2022-12-06 NOTE — ANESTHESIA POSTPROCEDURE EVALUATION
Procedure(s):  LEFT EYE PHACOEMULSIFICATION WITH INTRA OCULAR LENS IMPLANT WITH ISTENT (MAC WITH RETROBULBAR). MAC    Anesthesia Post Evaluation      Multimodal analgesia: multimodal analgesia used between 6 hours prior to anesthesia start to PACU discharge  Patient location during evaluation: PACU  Patient participation: complete - patient participated  Level of consciousness: awake and alert  Pain score: 0  Airway patency: patent  Anesthetic complications: no  Cardiovascular status: acceptable  Respiratory status: acceptable  Hydration status: acceptable  Comments: Pt's BP running high periop. Needs to take BP meds when he gets home.   Post anesthesia nausea and vomiting:  none  Final Post Anesthesia Temperature Assessment:  Normothermia (36.0-37.5 degrees C)      INITIAL Post-op Vital signs:   Vitals Value Taken Time   /104 12/06/22 1145   Temp 36.5 °C (97.7 °F) 12/06/22 1143   Pulse 61 12/06/22 1145   Resp 18 12/06/22 1145   SpO2 99 % 12/06/22 1145

## 2022-12-06 NOTE — DISCHARGE INSTRUCTIONS
Dr. Cheryl Monson and [address].  [address]  [phone]    Cataract Post Operative Instructions    Diet - you may eat a normal diet but avoid spicy or greasy tonight. Activity - Limit heavy lifting - do not lift more than 20 pounds for the next 7 days. Leave your eye patch on tonight. Your eye should remain closed under the patch. Your patch will be removed in Dr. Sonu Everett office tomorrow. Most people do not have significant pain after cataract surgery. You may take any over-the-counter pain medication that you normally take as needed. You may resume all of your pre-operative medications. You should have your postoperative drops. If you do not[facility] You will start eyedrops TOMORROW. You have an appointment with Dr. Ashlyn Vail tomorrow in her office. Date: ____12/7/2022____________ Time: _____9:15am____________    If you need to speak to Dr. Ashlyn Vail after business hours, please call [phone]. DO NOT TAKE SLEEPING MEDICATIONS OR ANTIANXIETY MEDICATIONS WHILE TAKING NARCOTIC PAIN MEDICATIONS,  ESPECIALLY THE NIGHT OF ANESTHESIA. CPAP PATIENTS BE SURE TO WEAR MACHINE WHENEVER NAPPING OR SLEEPING. DISCHARGE SUMMARY from Nurse    The following personal items collected during your admission are returned to you:   Dental Appliance: Dental Appliances: Lowers, Uppers  Vision: Visual Aid: Glasses  Hearing Aid:    Jewelry:    Clothing:    Other Valuables:    Valuables sent to safe:        PATIENT INSTRUCTIONS:    Anesthesia Discharge Instructions for Procedural Area requiring Sedation (MAC Anesthesia, Cath Lab, Endo and Radiology): You have been given medications during your procedure that may affect your memory and mental judgement for the next 24 hours.  During this time frame for your safety, please follow the instructions listed below :   Have a responsible adult to drive you home and be with you for at least 24 hours. Rest today and resume normal activities tomorrow. Start with a soft bland diet and advance as tolerated to your recommended diet. Do not drive any motor vehicle or operate mechanical or electrical equipment prior to [employer]. Avoid making critical decisions or signing legal documents prior to 6am tomorrow. Do not drink alcohol prior to 6am tomorrow. If you have sleep apnea and you plan to go home and take a nap, please use your CPAP machine not only at bedtime, but also while napping for 24 hours. If you notice any redness or swelling on parts of your body where IV medications were given, place a warm wet washcloth over the area for 20 minutes at a time until the redness or swelling goes away. If you still have redness or swelling after 2-3 days, please call us. You will receive a Post Operative Call from one of the Recovery Room Nurses on the day after your surgery to check on you. It is very important for us to know how you are recovering after your surgery. If you have an issue or need to speak with someone, please call your surgeon, do not wait for the post operative call. You may receive an e-mail or letter in the mail from [employer] regarding your experience with us in the Ambulatory Surgery Unit. Your feedback is valuable to us and we appreciate your participation in the survey. We wish you a speedy recovery ? What to do at Home:      *  Please give a list of your current medications to your Primary Care Provider. *  Please update this list whenever your medications are discontinued, doses are      changed, or new medications (including over-the-counter products) are added. *  Please carry medication information at all times in case of emergency situations. If you have not received your influenza and/or pneumococcal vaccine, please follow up with your primary care physician.       The discharge information has been reviewed with the patient and caregiver. The patient and caregiver verbalized understanding.

## 2022-12-06 NOTE — PERIOP NOTES
Francisco Javier Marshall.  1950  [id number]    Situation:  Verbal report given from: Nicolsá Vaughn RN and Lex Mckeon CRNA  Procedure: Procedure(s):  LEFT EYE PHACOEMULSIFICATION WITH INTRA OCULAR LENS IMPLANT WITH ISTENT (MAC WITH RETROBULBAR)    Background:    Preoperative diagnosis: Combined forms of age-related cataract of left eye [H25.812]  Primary open angle glaucoma of left eye, mild stage [H40.1121]    Postoperative diagnosis: Combined forms of age-related cataract of left eye [H25.812]  Primary open angle glaucoma of left eye, mild stage [N40.6874]    :  Dr. Crissy Brasher    Assistant(s): Circ-1: Thais Brunner, RN  Circ-2: Julieta Marshall RN  Scrub Tech-1: Feng Wilcox    Specimens: * No specimens in log *    Assessment:  Intra-procedure medications         Anesthesia gave intra-procedure sedation and medications, see anesthesia flow sheet     Intravenous fluids: Marah Arceo     Vital signs stable       Recommendation:    Permission to share finding with wife no

## 2022-12-14 NOTE — PERIOP NOTES
[facility]  Ambulatory Surgery Unit  Pre-operative Instructions    Surgery/Procedure Date  12/20/2022            Tentative Arrival Time TBD      1. On the day of your surgery/procedure, please report to the Ambulatory Surgery Unit Registration Desk and sign in at your designated time. The Ambulatory Surgery Unit is located in [facility] on the [facility] side of the [facility] across from the [address] [place] [address]. Please have all of your health insurance cards, co-payment, and a photo ID.    **TWO adults may accompany you the day of the procedure. We have limited seating available. If our waiting room is at capacity, your ride may be asked to remain in their vehicle. No one under 15 is allowed in the waiting room. 2. You must have someone with you to drive you home, as you should not drive a car for 24 hours following anesthesia. Please make arrangements for a responsible adult friend or family member to stay with you for at least the first 24 hours after your surgery. 3. Do not have anything to eat or drink (including water, gum, mints, coffee, juice) after 11:59 PM  12/19/2022. This may not apply to medications prescribed by your physician. (Please note below the special instructions with medications to take the morning of surgery, if applicable.)    4. We recommend you do not drink any alcoholic beverages for 24 hours before and after your surgery. 5. Contact your surgeons office for instructions on the following medications: non-steroidal anti-inflammatory drugs (i.e. Advil, Aleve), vitamins, and supplements. (Some surgeons will want you to stop these medications prior to surgery and others may allow you to take them)   **If you are currently taking Plavix, Coumadin, Aspirin and/or other blood-thinning agents, contact your surgeon for instructions. ** Your surgeon will partner with the physician prescribing these medications to determine if it is safe to stop or if you need to continue taking. Please do not stop taking these medications without instructions from your surgeon. 6. In an effort to help prevent surgical site infection, we ask that you shower with an anti-bacterial soap (i.e. Dial/Safeguard, or the soap provided to you at your preadmission testing appointment)  on the morning of surgery, using a fresh towel after each shower. (Please begin this process with fresh bed linens.) Do not apply any lotions, powders, or deodorants after the shower on the day of your procedure. If applicable, please do not shave the operative site for 48 hours prior to surgery. 7. Wear comfortable clothes. Wear glasses instead of contacts. Do not bring any jewelry or money (other than copays or fees as instructed). Do not wear make-up, particularly mascara, the morning of your surgery. Do not wear nail polish, particularly if you are having foot /hand surgery. Wear your hair loose or down, no ponytails, buns, juana pins or clips. All body piercings must be removed. 8. You should understand that if you do not follow these instructions your surgery may be cancelled. If your physical condition changes (i.e. fever, cold or flu) please contact your surgeon as soon as possible. 9. It is important that you be on time. If a situation occurs where you may be late, or if you have any questions or problems, please call [phone].    10. Your surgery time may be subject to change. You will receive a phone call the day prior to surgery to confirm your arrival time. 11. Pediatric patients: please bring a change of clothes, diapers, bottle/sippy cup, pacifier, etc.      Special Instructions: Take all medications and inhalers, as prescribed, on the morning of surgery with a sip of water EXCEPT: Diabetic medications( Glipizide and Insulins)      Insulin Dependent Diabetic patients: Take your diabetic medications as prescribed the day before surgery.   Hold all diabetic medications the day of surgery. If you are scheduled to arrive for surgery after 8:00 AM, and your AM blood sugar is >200, please call Ambulatory Surgery. I understand a pre-operative phone call will be made to verify my surgery time. In the event that I am not available, I give permission for a message to be left on my answering service and/or with another person?       Yes      Reviewed instructions with patient, able to verbalized understanding.       ___________________      ___________________      ________________  (Signature of Patient)          (Witness)                   (Date and Time)

## 2022-12-19 ENCOUNTER — ANESTHESIA EVENT (OUTPATIENT)
Dept: SURGERY | Age: 72
End: 2022-12-19
Payer: MEDICARE

## 2022-12-20 ENCOUNTER — HOSPITAL ENCOUNTER (OUTPATIENT)
Age: 72
Setting detail: OUTPATIENT SURGERY
Discharge: HOME | End: 2022-12-20
Attending: OPHTHALMOLOGY | Admitting: OPHTHALMOLOGY
Payer: MEDICARE

## 2022-12-20 ENCOUNTER — ANESTHESIA (OUTPATIENT)
Dept: SURGERY | Age: 72
End: 2022-12-20
Payer: MEDICARE

## 2022-12-20 VITALS
HEART RATE: 58 BPM | DIASTOLIC BLOOD PRESSURE: 53 MMHG | OXYGEN SATURATION: 100 % | BODY MASS INDEX: 27.99 KG/M2 | HEIGHT: 69 IN | RESPIRATION RATE: 16 BRPM | WEIGHT: 189 LBS | TEMPERATURE: 97.6 F | SYSTOLIC BLOOD PRESSURE: 161 MMHG

## 2022-12-20 LAB
GLUCOSE BLD STRIP.AUTO-MCNC: 87 MG/DL (ref 65–117)
SERVICE CMNT-IMP: NORMAL

## 2022-12-20 PROCEDURE — 74011000250 HC RX REV CODE- 250: Performed by: OPHTHALMOLOGY

## 2022-12-20 PROCEDURE — 77030006709 HC BLD OPHTH SSPC -B: Performed by: OPHTHALMOLOGY

## 2022-12-20 PROCEDURE — 82962 GLUCOSE BLOOD TEST: CPT

## 2022-12-20 PROCEDURE — 77030010802: Performed by: OPHTHALMOLOGY

## 2022-12-20 PROCEDURE — 74011250636 HC RX REV CODE- 250/636: Performed by: NURSE ANESTHETIST, CERTIFIED REGISTERED

## 2022-12-20 PROCEDURE — 74011000250 HC RX REV CODE- 250: Performed by: NURSE ANESTHETIST, CERTIFIED REGISTERED

## 2022-12-20 PROCEDURE — C1783 OCULAR IMP, AQUEOUS DRAIN DE: HCPCS | Performed by: OPHTHALMOLOGY

## 2022-12-20 PROCEDURE — 76060000061 HC AMB SURG ANES 0.5 TO 1 HR: Performed by: OPHTHALMOLOGY

## 2022-12-20 PROCEDURE — 76210000040 HC AMBSU PH I REC FIRST 0.5 HR: Performed by: OPHTHALMOLOGY

## 2022-12-20 PROCEDURE — 76210000046 HC AMBSU PH II REC FIRST 0.5 HR: Performed by: OPHTHALMOLOGY

## 2022-12-20 PROCEDURE — 2709999900 HC NON-CHARGEABLE SUPPLY: Performed by: OPHTHALMOLOGY

## 2022-12-20 PROCEDURE — 76030000000 HC AMB SURG OR TIME 0.5 TO 1: Performed by: OPHTHALMOLOGY

## 2022-12-20 PROCEDURE — 74011250636 HC RX REV CODE- 250/636: Performed by: OPHTHALMOLOGY

## 2022-12-20 PROCEDURE — V2632 POST CHMBR INTRAOCULAR LENS: HCPCS | Performed by: OPHTHALMOLOGY

## 2022-12-20 DEVICE — TRABECULAR MICRO-BYPASS STENT SYSTEM - LEFT
Type: IMPLANTABLE DEVICE | Site: EYE | Status: FUNCTIONAL
Brand: ISTENT

## 2022-12-20 DEVICE — ACRYSOF(R) IQ ASPHERIC NATURAL IOL, SINGLE-PIECE ACRYLIC FOLDABLE PCL, UV WITH BLUE LIGHTFILTER, 13.0MM LENGTH, 6.0MM ANTERIORASYMMETRIC BICONVEX OPTIC, PLANAR HAPTICS.
Type: IMPLANTABLE DEVICE | Site: EYE | Status: FUNCTIONAL
Brand: ACRYSOF®

## 2022-12-20 RX ORDER — ONDANSETRON 2 MG/ML
4 INJECTION INTRAMUSCULAR; INTRAVENOUS AS NEEDED
Status: DISCONTINUED | OUTPATIENT
Start: 2022-12-20 | End: 2022-12-20 | Stop reason: HOSPADM

## 2022-12-20 RX ORDER — SODIUM CHLORIDE 9 MG/ML
INJECTION, SOLUTION INTRAVENOUS
Status: DISCONTINUED | OUTPATIENT
Start: 2022-12-20 | End: 2022-12-20 | Stop reason: HOSPADM

## 2022-12-20 RX ORDER — FENTANYL CITRATE 50 UG/ML
50 INJECTION, SOLUTION INTRAMUSCULAR; INTRAVENOUS AS NEEDED
Status: DISCONTINUED | OUTPATIENT
Start: 2022-12-20 | End: 2022-12-20 | Stop reason: HOSPADM

## 2022-12-20 RX ORDER — TETRACAINE HYDROCHLORIDE 5 MG/ML
SOLUTION OPHTHALMIC AS NEEDED
Status: DISCONTINUED | OUTPATIENT
Start: 2022-12-20 | End: 2022-12-20 | Stop reason: HOSPADM

## 2022-12-20 RX ORDER — SODIUM CHLORIDE, SODIUM LACTATE, POTASSIUM CHLORIDE, CALCIUM CHLORIDE 600; 310; 30; 20 MG/100ML; MG/100ML; MG/100ML; MG/100ML
25 INJECTION, SOLUTION INTRAVENOUS CONTINUOUS
Status: DISCONTINUED | OUTPATIENT
Start: 2022-12-20 | End: 2022-12-20 | Stop reason: HOSPADM

## 2022-12-20 RX ORDER — PROPOFOL 10 MG/ML
INJECTION, EMULSION INTRAVENOUS AS NEEDED
Status: DISCONTINUED | OUTPATIENT
Start: 2022-12-20 | End: 2022-12-20 | Stop reason: HOSPADM

## 2022-12-20 RX ORDER — FENTANYL CITRATE 50 UG/ML
25 INJECTION, SOLUTION INTRAMUSCULAR; INTRAVENOUS
Status: DISCONTINUED | OUTPATIENT
Start: 2022-12-20 | End: 2022-12-20 | Stop reason: HOSPADM

## 2022-12-20 RX ORDER — LIDOCAINE HYDROCHLORIDE 10 MG/ML
0.1 INJECTION, SOLUTION EPIDURAL; INFILTRATION; INTRACAUDAL; PERINEURAL AS NEEDED
Status: DISCONTINUED | OUTPATIENT
Start: 2022-12-20 | End: 2022-12-20 | Stop reason: HOSPADM

## 2022-12-20 RX ORDER — NEOMYCIN SULFATE, POLYMYXIN B SULFATE, AND DEXAMETHASONE 3.5; 10000; 1 MG/G; [USP'U]/G; MG/G
OINTMENT OPHTHALMIC 4 TIMES DAILY
Status: DISCONTINUED | OUTPATIENT
Start: 2022-12-20 | End: 2022-12-20 | Stop reason: HOSPADM

## 2022-12-20 RX ORDER — MIDAZOLAM HYDROCHLORIDE 1 MG/ML
1 INJECTION, SOLUTION INTRAMUSCULAR; INTRAVENOUS AS NEEDED
Status: DISCONTINUED | OUTPATIENT
Start: 2022-12-20 | End: 2022-12-20 | Stop reason: HOSPADM

## 2022-12-20 RX ORDER — LIDOCAINE HYDROCHLORIDE 20 MG/ML
INJECTION, SOLUTION EPIDURAL; INFILTRATION; INTRACAUDAL; PERINEURAL AS NEEDED
Status: DISCONTINUED | OUTPATIENT
Start: 2022-12-20 | End: 2022-12-20 | Stop reason: HOSPADM

## 2022-12-20 RX ORDER — SODIUM CHLORIDE 9 MG/ML
25 INJECTION, SOLUTION INTRAVENOUS ONCE
Status: COMPLETED | OUTPATIENT
Start: 2022-12-20 | End: 2022-12-20

## 2022-12-20 RX ORDER — CYCLOPENTOLATE HYDROCHLORIDE 20 MG/ML
1 SOLUTION/ DROPS OPHTHALMIC
Status: COMPLETED | OUTPATIENT
Start: 2022-12-20 | End: 2022-12-20

## 2022-12-20 RX ORDER — NEOMYCIN SULFATE, POLYMYXIN B SULFATE, AND DEXAMETHASONE 3.5; 10000; 1 MG/G; [USP'U]/G; MG/G
OINTMENT OPHTHALMIC AS NEEDED
Status: DISCONTINUED | OUTPATIENT
Start: 2022-12-20 | End: 2022-12-20 | Stop reason: HOSPADM

## 2022-12-20 RX ORDER — DICLOFENAC SODIUM 1 MG/ML
1 SOLUTION/ DROPS OPHTHALMIC
Status: COMPLETED | OUTPATIENT
Start: 2022-12-20 | End: 2022-12-20

## 2022-12-20 RX ORDER — PROPARACAINE HYDROCHLORIDE 5 MG/ML
1 SOLUTION/ DROPS OPHTHALMIC
Status: COMPLETED | OUTPATIENT
Start: 2022-12-20 | End: 2022-12-20

## 2022-12-20 RX ORDER — HYDROMORPHONE HYDROCHLORIDE 1 MG/ML
.2-.5 INJECTION, SOLUTION INTRAMUSCULAR; INTRAVENOUS; SUBCUTANEOUS
Status: DISCONTINUED | OUTPATIENT
Start: 2022-12-20 | End: 2022-12-20 | Stop reason: HOSPADM

## 2022-12-20 RX ORDER — PHENYLEPHRINE HYDROCHLORIDE 25 MG/ML
1 SOLUTION/ DROPS OPHTHALMIC
Status: COMPLETED | OUTPATIENT
Start: 2022-12-20 | End: 2022-12-20

## 2022-12-20 RX ORDER — TROPICAMIDE 10 MG/ML
1 SOLUTION/ DROPS OPHTHALMIC
Status: COMPLETED | OUTPATIENT
Start: 2022-12-20 | End: 2022-12-20

## 2022-12-20 RX ADMIN — TROPICAMIDE 1 DROP: 10 SOLUTION/ DROPS OPHTHALMIC at 10:28

## 2022-12-20 RX ADMIN — CYCLOPENTOLATE HYDROCHLORIDE 1 DROP: 20 SOLUTION/ DROPS OPHTHALMIC at 10:35

## 2022-12-20 RX ADMIN — DICLOFENAC SODIUM 1 DROP: 1 SOLUTION/ DROPS OPHTHALMIC at 10:35

## 2022-12-20 RX ADMIN — DICLOFENAC SODIUM 1 DROP: 1 SOLUTION/ DROPS OPHTHALMIC at 10:28

## 2022-12-20 RX ADMIN — PHENYLEPHRINE HYDROCHLORIDE 1 DROP: 25 SOLUTION/ DROPS OPHTHALMIC at 10:35

## 2022-12-20 RX ADMIN — PHENYLEPHRINE HYDROCHLORIDE 1 DROP: 25 SOLUTION/ DROPS OPHTHALMIC at 10:41

## 2022-12-20 RX ADMIN — TROPICAMIDE 1 DROP: 10 SOLUTION/ DROPS OPHTHALMIC at 10:41

## 2022-12-20 RX ADMIN — PROPOFOL 40 MG: 10 INJECTION, EMULSION INTRAVENOUS at 11:07

## 2022-12-20 RX ADMIN — CYCLOPENTOLATE HYDROCHLORIDE 1 DROP: 20 SOLUTION/ DROPS OPHTHALMIC at 10:28

## 2022-12-20 RX ADMIN — PROPARACAINE HYDROCHLORIDE 1 DROP: 5 SOLUTION/ DROPS OPHTHALMIC at 10:41

## 2022-12-20 RX ADMIN — PHENYLEPHRINE HYDROCHLORIDE 1 DROP: 25 SOLUTION/ DROPS OPHTHALMIC at 10:28

## 2022-12-20 RX ADMIN — CYCLOPENTOLATE HYDROCHLORIDE 1 DROP: 20 SOLUTION/ DROPS OPHTHALMIC at 10:41

## 2022-12-20 RX ADMIN — SODIUM CHLORIDE: 0.9 INJECTION, SOLUTION INTRAVENOUS at 11:02

## 2022-12-20 RX ADMIN — PROPARACAINE HYDROCHLORIDE 1 DROP: 5 SOLUTION/ DROPS OPHTHALMIC at 10:28

## 2022-12-20 RX ADMIN — LIDOCAINE HYDROCHLORIDE 40 MG: 20 INJECTION, SOLUTION EPIDURAL; INFILTRATION; INTRACAUDAL; PERINEURAL at 11:07

## 2022-12-20 RX ADMIN — SODIUM CHLORIDE 25 ML/HR: 9 INJECTION, SOLUTION INTRAVENOUS at 10:26

## 2022-12-20 RX ADMIN — DICLOFENAC SODIUM 1 DROP: 1 SOLUTION/ DROPS OPHTHALMIC at 10:41

## 2022-12-20 RX ADMIN — TROPICAMIDE 1 DROP: 10 SOLUTION/ DROPS OPHTHALMIC at 10:35

## 2022-12-20 NOTE — DISCHARGE INSTRUCTIONS
Dr. Donna Saldivar and [address].  [address]  [phone]    Cataract Post Operative Instructions    Diet - you may eat a normal diet but avoid spicy or greasy tonight. Activity - Limit heavy lifting - do not lift more than 20 pounds for the next 7 days. Leave your eye patch on tonight. Your eye should remain closed under the patch. Your patch will be removed in Dr. Roberta Covington office tomorrow. Most people do not have significant pain after cataract surgery. You may take any over-the-counter pain medication that you normally take as needed. You may resume all of your pre-operative medications. You should have your postoperative drops. If you do not[facility] You will start eyedrops TOMORROW. You have an appointment with Dr. Bossman Henderson tomorrow in her office. Date: ____12/1/22____________ Time: ________9:30am_________    If you need to speak to Dr. Bossman Henderson after business hours, please call [phone]. DO NOT TAKE SLEEPING MEDICATIONS OR ANTIANXIETY MEDICATIONS WHILE TAKING NARCOTIC PAIN MEDICATIONS,  ESPECIALLY THE NIGHT OF ANESTHESIA. CPAP PATIENTS BE SURE TO WEAR MACHINE WHENEVER NAPPING OR SLEEPING. DISCHARGE SUMMARY from Nurse    The following personal items collected during your admission are returned to you:   Dental Appliance: Dental Appliances: Lowers, Uppers  Vision: Visual Aid: Glasses  Hearing Aid:    Jewelry:    Clothing:    Other Valuables:    Valuables sent to safe:        PATIENT INSTRUCTIONS:    Anesthesia Discharge Instructions for Procedural Area requiring Sedation (MAC Anesthesia, Cath Lab, Endo and Radiology): You have been given medications during your procedure that may affect your memory and mental judgement for the next 24 hours.  During this time frame for your safety, please follow the instructions listed below :   Have a responsible adult to drive you home and be with you for at least 24 hours. Rest today and resume normal activities tomorrow. Start with a soft bland diet and advance as tolerated to your recommended diet. Do not drive any motor vehicle or operate mechanical or electrical equipment prior to [employer]. Avoid making critical decisions or signing legal documents prior to 6am tomorrow. Do not drink alcohol prior to 6am tomorrow. If you have sleep apnea and you plan to go home and take a nap, please use your CPAP machine not only at bedtime, but also while napping for 24 hours. If you notice any redness or swelling on parts of your body where IV medications were given, place a warm wet washcloth over the area for 20 minutes at a time until the redness or swelling goes away. If you still have redness or swelling after 2-3 days, please call us. You will receive a Post Operative Call from one of the Recovery Room Nurses on the day after your surgery to check on you. It is very important for us to know how you are recovering after your surgery. If you have an issue or need to speak with someone, please call your surgeon, do not wait for the post operative call. You may receive an e-mail or letter in the mail from [employer] regarding your experience with us in the Ambulatory Surgery Unit. Your feedback is valuable to us and we appreciate your participation in the survey. We wish you a speedy recovery ? What to do at Home:      *  Please give a list of your current medications to your Primary Care Provider. *  Please update this list whenever your medications are discontinued, doses are      changed, or new medications (including over-the-counter products) are added. *  Please carry medication information at all times in case of emergency situations. If you have not received your influenza and/or pneumococcal vaccine, please follow up with your primary care physician.       The discharge information has been reviewed with the patient and caregiver. The patient and caregiver verbalized understanding.

## 2022-12-20 NOTE — OP NOTES
Operative Note  . Operative Report    Patient: Lisa Song MRN: [id number]  SSN: [id number]    YOB: 1950  Age: 67 y.o. Sex: male      Indications: The patient complains of painless progressive visual loss and reports difficulty with activities of daily living. Cataract surgery has been recommended to improve vision for activities of daily living, and the patient has elected to proceed. Date of Surgery: 12/20/2022     Preoperative Diagnosis: CATARACT OD H25.811  POAG MILD OD H40.1111    Postoperative Diagnosis: CATARACT OD  POAG MILD OD    Surgeon(s) and Role:     * Deanna Short MD - Primary     Anesthesia:  Other     Procedure: Procedure(s):  PHACOEMULSIFICATION WITH INTRA OCULAR LENS IMPLANT WITH ISTENT INSERTION - RIGHT EYE ([address]) [address]    Procedure in Detail:  After informed consent was obtained, the patient was brought into the operating suite. MAC with sedation and a retrobulbar block using a 50/50 mixture of lidocaine 2% and Marcaine 0.75% with added Amphatase was administered without complication. The patient was prepped and draped in the usual sterile ophthalmic fashion. A wire lid speculum was placed. A paracentesis was made using a 75 blade. The eye was filled with Provisc. A 2.8mm keratome was used to make a temporal clear corneal incision. A cystotome and Utrada forcep was used to make a continuous curvilinear capsulorrhexis without complication. Hydrodisection was performed until the nuclear rotated freely in the capsular bag. The cataract was removed using a two handed divide and conquer technique using a Swan as a second instrument. Irrigation/aspiration was used to remove the remaining cortex. Capsular polish was used as needed. The bag was inflated using Provisc. An intraocular lens was placed into the bag without complication. The sulcus was inflated with Provisc. A gonioprism was used to visualize the trabecular meshwork.   A left handed iSTENT was placed into the trabecular meshwork without complication. Irrigation/aspiration was used to remove the viscoelastic. The chamber was filled with BSS and the wound hydrated. The wound was found to be watertight. Subconjunctival injections of ancef and decadron were given. The lid speculum and drape was removed. The eye was dressed with ointment, eye pads, and eye shield. The patient was taken to the recover room having tolerated the procedure well. Findings:  None         Estimated Blood Loss:  Minimal    Specimens: * No specimens in log *                Implants:   Implant Name Type Inv.  Item Serial No.  Lot No. LRB No. Used Action   LENS IOL POST 1-PC 6X13 21.0 -- ACRYSOF - [id number]  LENS IOL POST 1-PC 6X13 21.0 -- ACRYSOF [id number] [employer]WD NA Right 1 Implanted   SHUNT EYE TRABECLAR VASHTI INVAS -- ISTENT - [id number]  SHUNT EYE TRABECLAR VASHTI INVAS -- ISTENT [id number] [employer] [id number] Right 1 Implanted              Complications:  None

## 2022-12-20 NOTE — ANESTHESIA PREPROCEDURE EVALUATION
Relevant Problems   NEUROLOGY   (+) CVA (cerebral vascular accident) ([facility] [address]      CARDIOVASCULAR   (+) CAD (coronary artery disease)      ENDOCRINE   (+) DM (diabetes mellitus) (HCC)      PERSONAL HX & FAMILY HX OF CANCER   (+) Prostate cancer (HCC)       Anesthetic History   No history of anesthetic complications            Review of Systems / Medical History  Patient summary reviewed, nursing notes reviewed and pertinent labs reviewed    Pulmonary  Within defined limits                 Neuro/Psych       CVA ( some mild weakness right leg)       Cardiovascular    Hypertension          Hyperlipidemia    Exercise tolerance: >4 METS     GI/Hepatic/Renal     GERD: well controlled      PUD     Endo/Other    Diabetes: well controlled    Arthritis and cancer     Other Findings   Comments: Cataracts    Rheumatoid arthritis  Prostate Cancer  Chronic pain           Physical Exam    Airway  Mallampati: II  TM Distance: 4 - 6 cm  Neck ROM: normal range of motion   Mouth opening: Normal     Cardiovascular    Rhythm: regular  Rate: normal         Dental    Dentition: Upper partial plate and Lower partial plate     Pulmonary  Breath sounds clear to auscultation               Abdominal  GI exam deferred       Other Findings            Anesthetic Plan    ASA: 3  Anesthesia type: MAC          Induction: Intravenous  Anesthetic plan and risks discussed with: Patient

## 2022-12-20 NOTE — BRIEF OP NOTE
Brief Postoperative Note    Patient: Beatriz Garner YOB: 1950  MRN: [id number]    Date of Procedure: 12/20/2022     Pre-Op Diagnosis: CATARACT OD  POAG MILD OD    Post-Op Diagnosis: Same as preoperative diagnosis. Procedure(s):  PHACOEMULSIFICATION WITH INTRA OCULAR LENS IMPLANT WITH ISTENT INSERTION - RIGHT EYE (ANES RETROBULBAR)    Surgeon(s):  Gena Castillo MD    Surgical Assistant: None    Anesthesia: Other     Estimated Blood Loss (mL): Minimal    Complications: None    Specimens: * No specimens in log *     Implants:   Implant Name Type Inv.  Item Serial No.  Lot No. LRB No. Used Action   LENS IOL POST 1-PC 6X13 21.0 -- ACRYSOF - [id number]  LENS IOL POST 1-PC 6X13 21.0 -- ACRYSOF [id number] [employer]WD NA Right 1 Implanted   SHUNT EYE TRABECLAR VASHTI INVAS -- ISTENT - [id number]  SHUNT EYE TRABECLAR VASHTI INVAS -- ISTENT [id number] [employer] [id number] Right 1 Implanted       Drains: * No LDAs found *    Findings: none    Electronically Signed by Tawana Land MD on 12/20/2022 at 1:42 PM

## 2022-12-20 NOTE — ANESTHESIA POSTPROCEDURE EVALUATION
Procedure(s):  PHACOEMULSIFICATION WITH INTRA OCULAR LENS IMPLANT WITH ISTENT INSERTION - RIGHT EYE (ANES RETROBULBAR). MAC    Anesthesia Post Evaluation      Multimodal analgesia: multimodal analgesia used between 6 hours prior to anesthesia start to PACU discharge  Patient location during evaluation: PACU  Patient participation: complete - patient participated  Level of consciousness: sleepy but conscious  Pain management: adequate  Airway patency: patent  Anesthetic complications: no  Cardiovascular status: acceptable, blood pressure returned to baseline and hemodynamically stable  Respiratory status: acceptable and nasal cannula  Hydration status: acceptable  Post anesthesia nausea and vomiting:  none  Final Post Anesthesia Temperature Assessment:  Normothermia (36.0-37.5 degrees C)      INITIAL Post-op Vital signs:   Vitals Value Taken Time   /64 12/20/22 1145   Temp 36.3 °C (97.4 °F) 12/20/22 1141   Pulse 54 12/20/22 1147   Resp 14 12/20/22 1147   SpO2 97 % 12/20/22 1147   Vitals shown include unvalidated device data.

## 2022-12-20 NOTE — PERIOP NOTES
Patient received to PACU, VSS. Patient awake and alert with no complaints of pain. Dressing to right eye intact. Patient tolerating liquids. 1200: Discharge instructions given. Patient and wife verbalize understanding of instructions and follow up appointment. Patient and wife state ready for discharge. IV removed. Patient discharged at this time by wheelchair with belongings, wife to provide transportation home.

## 2022-12-20 NOTE — PERIOP NOTES
Madhav Siegel.  1950  [id number]    Situation:  Verbal report given from: Adonis Yi RN and Cathi Breaux CRNA  Procedure: Procedure(s):  PHACOEMULSIFICATION WITH INTRA OCULAR LENS IMPLANT WITH ISTENT INSERTION - RIGHT EYE (ANES RETROBULBAR)    Background:    Preoperative diagnosis: CATARACT OD  POAG MILD OD    Postoperative diagnosis: CATARACT OD  POAG MILD OD    :  Dr. Dorota Keys    Assistant(s): Circ-1: Bertha Sher RN  Circ-2: Maryellen Araujo RN  Circ-Relief: Addie Brooks RN; Natalie BOYD  Scrub Tech-1: Idalmis Greene    Specimens: * No specimens in log *    Assessment:  Intra-procedure medications         Anesthesia gave intra-procedure sedation and medications, see anesthesia flow sheet     Intravenous fluids: LR@ KVO     Vital signs stable       Recommendation:    Permission to share finding with wife Elaine Read : yes

## 2023-04-18 NOTE — PERIOP NOTES
PREOP MEDICATIONS AND INSTRUCTIONS PRINTED AND REVIEWED WITH PATIENT. COPY OF COVID CARD ON CHART    CONSENT SIGNED AND PLACED ON MEDICAL CHART    RECEIVED MOST RECENT LAB FROM PCP -DR. MUNIZ AND PLACED ON MEDICAL CHART PROVIDER:[TOKEN:[[id number]]] Attending with

## 2023-04-29 RX ORDER — INSULIN GLARGINE 100 [IU]/ML
20 INJECTION, SOLUTION SUBCUTANEOUS
COMMUNITY

## 2024-05-14 ENCOUNTER — ANESTHESIA EVENT (OUTPATIENT)
Facility: HOSPITAL | Age: 74
End: 2024-05-14
Payer: MEDICARE

## 2024-05-14 ENCOUNTER — HOSPITAL ENCOUNTER (OUTPATIENT)
Facility: HOSPITAL | Age: 74
Setting detail: OUTPATIENT SURGERY
Discharge: HOME | End: 2024-05-14
Attending: INTERNAL MEDICINE | Admitting: INTERNAL MEDICINE
Payer: MEDICARE

## 2024-05-14 ENCOUNTER — ANESTHESIA (OUTPATIENT)
Facility: HOSPITAL | Age: 74
End: 2024-05-14
Payer: MEDICARE

## 2024-05-14 VITALS
DIASTOLIC BLOOD PRESSURE: 69 MMHG | RESPIRATION RATE: 15 BRPM | SYSTOLIC BLOOD PRESSURE: 122 MMHG | WEIGHT: 178 LBS | OXYGEN SATURATION: 95 % | HEART RATE: 60 BPM | BODY MASS INDEX: 26.36 KG/M2 | HEIGHT: 69 IN | TEMPERATURE: 97.5 F

## 2024-05-14 PROCEDURE — 6360000002 HC RX W HCPCS

## 2024-05-14 PROCEDURE — 2709999900 HC NON-CHARGEABLE SUPPLY: Performed by: INTERNAL MEDICINE

## 2024-05-14 PROCEDURE — 3600007512: Performed by: INTERNAL MEDICINE

## 2024-05-14 PROCEDURE — 7100000011 HC PHASE II RECOVERY - ADDTL 15 MIN: Performed by: INTERNAL MEDICINE

## 2024-05-14 PROCEDURE — 3700000000 HC ANESTHESIA ATTENDED CARE: Performed by: INTERNAL MEDICINE

## 2024-05-14 PROCEDURE — 7100000010 HC PHASE II RECOVERY - FIRST 15 MIN: Performed by: INTERNAL MEDICINE

## 2024-05-14 PROCEDURE — 2500000003 HC RX 250 WO HCPCS

## 2024-05-14 PROCEDURE — 2580000003 HC RX 258

## 2024-05-14 PROCEDURE — 3700000001 HC ADD 15 MINUTES (ANESTHESIA): Performed by: INTERNAL MEDICINE

## 2024-05-14 PROCEDURE — 3600007502: Performed by: INTERNAL MEDICINE

## 2024-05-14 PROCEDURE — 88305 TISSUE EXAM BY PATHOLOGIST: CPT

## 2024-05-14 RX ORDER — SODIUM CHLORIDE 9 MG/ML
INJECTION, SOLUTION INTRAVENOUS CONTINUOUS
Status: DISCONTINUED | OUTPATIENT
Start: 2024-05-14 | End: 2024-05-14 | Stop reason: HOSPADM

## 2024-05-14 RX ORDER — SODIUM CHLORIDE 9 MG/ML
25 INJECTION, SOLUTION INTRAVENOUS PRN
Status: DISCONTINUED | OUTPATIENT
Start: 2024-05-14 | End: 2024-05-14 | Stop reason: HOSPADM

## 2024-05-14 RX ORDER — LIDOCAINE HYDROCHLORIDE 20 MG/ML
INJECTION, SOLUTION EPIDURAL; INFILTRATION; INTRACAUDAL; PERINEURAL PRN
Status: DISCONTINUED | OUTPATIENT
Start: 2024-05-14 | End: 2024-05-14 | Stop reason: SDUPTHER

## 2024-05-14 RX ORDER — SODIUM CHLORIDE 0.9 % (FLUSH) 0.9 %
5-40 SYRINGE (ML) INJECTION PRN
Status: DISCONTINUED | OUTPATIENT
Start: 2024-05-14 | End: 2024-05-14 | Stop reason: HOSPADM

## 2024-05-14 RX ORDER — SEMAGLUTIDE 1.34 MG/ML
INJECTION, SOLUTION SUBCUTANEOUS WEEKLY
COMMUNITY

## 2024-05-14 RX ORDER — SODIUM CHLORIDE 9 MG/ML
INJECTION, SOLUTION INTRAVENOUS CONTINUOUS PRN
Status: DISCONTINUED | OUTPATIENT
Start: 2024-05-14 | End: 2024-05-14 | Stop reason: SDUPTHER

## 2024-05-14 RX ORDER — FAMOTIDINE 20 MG/1
20 TABLET, FILM COATED ORAL 2 TIMES DAILY
COMMUNITY

## 2024-05-14 RX ORDER — SODIUM CHLORIDE 0.9 % (FLUSH) 0.9 %
5-40 SYRINGE (ML) INJECTION EVERY 12 HOURS SCHEDULED
Status: DISCONTINUED | OUTPATIENT
Start: 2024-05-14 | End: 2024-05-14 | Stop reason: HOSPADM

## 2024-05-14 RX ADMIN — PROPOFOL 25 MG: 10 INJECTION, EMULSION INTRAVENOUS at 09:09

## 2024-05-14 RX ADMIN — LIDOCAINE HYDROCHLORIDE 50 MG: 20 INJECTION, SOLUTION EPIDURAL; INFILTRATION; INTRACAUDAL; PERINEURAL at 08:54

## 2024-05-14 RX ADMIN — PROPOFOL 25 MG: 10 INJECTION, EMULSION INTRAVENOUS at 09:06

## 2024-05-14 RX ADMIN — PROPOFOL 25 MG: 10 INJECTION, EMULSION INTRAVENOUS at 09:04

## 2024-05-14 RX ADMIN — SODIUM CHLORIDE: 9 INJECTION, SOLUTION INTRAVENOUS at 08:25

## 2024-05-14 RX ADMIN — PROPOFOL 100 MG: 10 INJECTION, EMULSION INTRAVENOUS at 08:54

## 2024-05-14 RX ADMIN — PROPOFOL 25 MG: 10 INJECTION, EMULSION INTRAVENOUS at 08:59

## 2024-05-14 RX ADMIN — PROPOFOL 25 MG: 10 INJECTION, EMULSION INTRAVENOUS at 08:57

## 2024-05-14 RX ADMIN — PROPOFOL 25 MG: 10 INJECTION, EMULSION INTRAVENOUS at 09:01

## 2024-05-14 ASSESSMENT — PAIN - FUNCTIONAL ASSESSMENT: PAIN_FUNCTIONAL_ASSESSMENT: NONE - DENIES PAIN

## 2024-05-14 NOTE — PROGRESS NOTES

## 2024-05-14 NOTE — H&P
[facility] [address] [id number] [phone]        History and Physical     NAME:  Stephen Donaldson Jr.   :  1950   MRN:  [id number]         HPI:  Stephen Donaldson Jr. is a 73 y.o. male here for colonoscopy. Patient has history of colon polyps. Last colonoscopy   . No family history of colon cancer. No GI symptoms.     Past Surgical History:   Procedure Laterality Date    CATARACT REMOVAL Left 2022    COLONOSCOPY      COLONOSCOPY N/A 2020    COLONOSCOPY performed by Lizz Begum MD at [facility] ENDOSCOPY    ORTHOPEDIC SURGERY Right     THREE TITANIUM SCREWS IN LEG    PROSTATE BIOPSY, NEEDLE, SATURATION SAMPLING      PROSTATECTOMY       Past Medical History:   Diagnosis Date    Chronic pain     Diabetes (HCC)     GERD (gastroesophageal reflux disease)     H/O echocardiogram     50-55%  small apical defect    History of kidney infection     Hypercholesterolemia     Hypertension     Prostate cancer (HCC)     PUD (peptic ulcer disease)     Rheumatoid arthritis involving right hand (HCC)     Sepsis (HCC)     Stomach ulcer     Stroke (HCC)     RIGHT LEG HEAVY AFTER ALOT OF EXERCISE     Social History     Tobacco Use    Smoking status: Former     Current packs/day: 0.25     Types: Cigarettes    Smokeless tobacco: Never   Substance Use Topics    Alcohol use: No    Drug use: No     No current facility-administered medications on file prior to encounter.     Current Outpatient Medications on File Prior to Encounter   Medication Sig Dispense Refill    Insulin Glargine (TOUJEO MAX SOLOSTAR SC) Inject into the skin      insulin glargine (LANTUS) 100 UNIT/ML injection vial Inject 20 Units into the skin Daily with supper (Patient not taking: Reported on 2024)      amLODIPine (NORVASC) 10 MG tablet Take 1 tablet by mouth daily      aspirin 81 MG EC tablet Take 1 tablet by mouth daily      celecoxib (CELEBREX) 200 MG capsule Take 1 capsule by mouth

## 2024-05-14 NOTE — ANESTHESIA PRE PROCEDURE
BP Readings from Last 3 Encounters:   No data found for BP       NPO Status:                                                                                 BMI:   Wt Readings from Last 3 Encounters:   08/07/23 85.7 kg (189 lb)     There is no height or weight on file to calculate BMI.    CBC:   Lab Results   Component Value Date/Time    WBC 4.3 10/28/2022 12:40 AM    RBC 4.71 10/28/2022 12:40 AM    HGB 14.2 10/28/2022 12:40 AM    HCT 42.6 10/28/2022 12:40 AM    MCV 90.4 10/28/2022 12:40 AM    RDW 13.0 10/28/2022 12:40 AM     10/28/2022 12:40 AM       CMP:   Lab Results   Component Value Date/Time     10/28/2022 12:40 AM    K 3.9 10/28/2022 12:40 AM     10/28/2022 12:40 AM    CO2 24 10/28/2022 12:40 AM    BUN 19 10/28/2022 12:40 AM    CREATININE 1.13 10/28/2022 12:40 AM    GFRAA >60 04/12/2022 09:35 AM    AGRATIO 0.9 10/28/2022 12:40 AM    LABGLOM >60 10/28/2022 12:40 AM    GLUCOSE 184 10/28/2022 12:40 AM    CALCIUM 8.5 10/28/2022 12:40 AM    BILITOT 0.5 10/28/2022 12:40 AM    ALKPHOS 74 10/28/2022 12:40 AM    AST 11 10/28/2022 12:40 AM    ALT 17 10/28/2022 12:40 AM       POC Tests: No results for input(s): \"POCGLU\", \"POCNA\", \"POCK\", \"POCCL\", \"POCBUN\", \"POCHEMO\", \"POCHCT\" in the last 72 hours.    Coags:   Lab Results   Component Value Date/Time    PROTIME 11.0 04/12/2022 09:35 AM    INR 1.1 04/12/2022 09:35 AM    APTT 31.6 04/12/2022 09:35 AM       HCG (If Applicable): No results found for: \"PREGTESTUR\", \"PREGSERUM\", \"HCG\", \"HCGQUANT\"     ABGs: No results found for: \"PHART\", \"PO2ART\", \"KHU9JHJ\", \"YSC8LVV\", \"BEART\", \"B1DDZWEW\"     Type & Screen (If Applicable):  No results found for: \"LABABO\"    Drug/Infectious Status (If Applicable):  No results found for: \"HIV\", \"HEPCAB\"    COVID-19 Screening (If Applicable):   Lab Results   Component Value Date/Time    COVID19 Not Detected 11/08/2020 07:40 AM           Anesthesia Evaluation  Patient summary reviewed and Nursing notes

## 2024-05-14 NOTE — DISCHARGE INSTRUCTIONS
last from a few hours to a week or more. They include:  Belly pain. This is usually in the lower left side. It is sometimes worse when you move. This is the most common symptom.  Fever and chills.  Bloating and gas.  Diarrhea or constipation.  Nausea and sometimes vomiting.  Not feeling like eating.  If the pouches bleed, it is called diverticular bleeding.  How can you prevent diverticulitis?  You may be able to lower your chance of getting diverticulitis. You can do this by taking steps to prevent constipation.  Eat fruits, vegetables, beans, and whole grains every day. These foods are high in fiber.  Drink plenty of fluids. If you have kidney, heart, or liver disease and have to limit fluids, talk with your doctor before you increase the amount of fluids you drink.  Get at least 30 minutes of exercise on most days of the week. Walking is a good choice.  Take a fiber supplement (such as Citrucel or Metamucil) every day if needed. Read and follow all instructions on the label.  Schedule time each day for a bowel movement. Having a daily routine may help. Take your time and do not strain when having a bowel movement.  How are these problems treated?  The best way to treat diverticulosis is to avoid constipation. Treatment for diverticulitis includes antibiotics. It often includes a change in your diet. You may need only liquids at first. Your doctor may suggest medicines for pain or belly cramps. In some cases, surgery may be needed.  Follow-up care is a key part of your treatment and safety. Be sure to make and go to all appointments, and call your doctor if you are having problems. It's also a good idea to know your test results and keep a list of the medicines you take.  Where can you learn more?  Go to https://www.[employer].net/patientEd and enter E426 to learn more about \"Learning About Diverticulosis and Diverticulitis.\"  Current as of: June 6, 2022               Content Version: 13.6  © 8899-0206 [employer]wise,

## 2024-05-14 NOTE — ANESTHESIA POSTPROCEDURE EVALUATION
Department of Anesthesiology  Postprocedure Note    Patient: Stephen Donaldson Jr.  MRN: [id number]  YOB: 1950  Date of evaluation: 5/14/2024    Procedure Summary       Date: 05/14/24 Room / Location: [facility] ENDO 06 / [facility] ENDOSCOPY    Anesthesia Start: 0853 Anesthesia Stop: 0914    Procedure: COLONOSCOPY Diagnosis:       Hx of colonic polyps      (Hx of colonic polyps [Z86.010])    Surgeons: Lizz Begum MD Responsible Provider: Stevenson Hernandez MD    Anesthesia Type: MAC ASA Status: 2            Anesthesia Type: MAC    Jong Phase I: Jong Score: 10    Jong Phase II: Jong Score: 9    Anesthesia Post Evaluation    No notable events documented.

## 2024-05-14 NOTE — ANESTHESIA POSTPROCEDURE EVALUATION
Department of Anesthesiology  Postprocedure Note    Patient: Stephen Donaldson Jr.  MRN: [id number]  YOB: 1950  Date of evaluation: 5/14/2024    Procedure Summary       Date: 05/14/24 Room / Location: [facility] 06 / [facility] ENDOSCOPY    Anesthesia Start: 0853 Anesthesia Stop: 0914    Procedure: COLONOSCOPY Diagnosis:       Hx of colonic polyps      (Hx of colonic polyps [Z86.010])    Surgeons: Lizz Begum MD Responsible Provider: Stevenson Hernandez MD    Anesthesia Type: MAC ASA Status: 2            Anesthesia Type: MAC    Jong Phase I: Jong Score: 10    Jong Phase II: Jong Score: 9    Anesthesia Post Evaluation    Patient location during evaluation: PACU  Patient participation: complete - patient participated  Level of consciousness: awake  Airway patency: patent  Nausea & Vomiting: no nausea  Cardiovascular status: blood pressure returned to baseline and hemodynamically stable  Respiratory status: acceptable  Hydration status: stable  Multimodal analgesia pain management approach    No notable events documented.

## 2024-05-14 NOTE — OP NOTE
medication(s).  -A high fiber diet with plenty of fluids (up to 8 glasses of water daily) is suggested to relieve these symptoms.  Metamucil, 1 tablespoon once or twice daily can be used to keep bowels regular if needed.  -Await pathology.  -If adenoma is present, repeat colonoscopy in 5 years.      Discharge Disposition:  Home in the company of a  when able to ambulate.    Lizz Begum MD  5/14/2024  9:16 AM

## 2025-05-02 ENCOUNTER — OFFICE VISIT (OUTPATIENT)
Age: 75
End: 2025-05-02
Payer: MEDICARE

## 2025-05-02 DIAGNOSIS — E11.9 DIABETES MELLITUS WITHOUT COMPLICATION (HCC): Primary | ICD-10-CM

## 2025-05-02 PROCEDURE — G0108 DIAB MANAGE TRN  PER INDIV: HCPCS

## 2025-05-02 NOTE — PROGRESS NOTES
[facility]  Diabetes Self-Management Education & Support Program    Reason for Referral: DSMES initial   Referral Source: Noel Adames MD  Services requested: DSMES       ASSESSMENT    From my perspective, the participant would benefit from DSMES specifically related to reducing risks, healthy eating, monitoring, taking medications, physical activity, healthy coping, and problem solving. Will adapt DSMES program to build on participant's skills score, confidence score, and preparedness score as noted in the Diabetes Skills, Confidence, and Preparedness Index.    During the program, we will focus on providing DSMES that specifically addresses participant's interest in healthy eating, as shown by their reported readiness to change.    NOTES:  Blood Sugars= checking blood sugars in AM fasting .  States most bs in am are on the 70's , 80's even if he eat dinner. On PM's when he check , bs = 120's. Patient doesn't have a bs log or glucometer with him. States yesterday had a bs of 68 mg/dl in am. Today bs fasting was 71 mg. Both previous evening, he skip his dinner.  Advised to eat Breakfast, lunch , dinner - 4-5 hrs apart , can have snacks in between meals and HS. Check bs prior taking insulin at HS. Educated about s/sx, cause, prevention and tx for low blood sugars. Patient was tx low bs with OJ or peanut butter.  Advised not to use peanut butter to tx low bs and follow education given today. Written handout given. Patient advised to contact his provider regarding the low blood sugars and bs on the 70's in AM .    Healthy eating = Patient advised not to skip meals. Skips dinner, bs in am on the 70's.  States yesterday had a bs of 68 mg/dl , skip  his dinner.  He states that even when he doesn't skip his dinner his bs are on the 70's in AM . Advised to check his blood sugars at HS prior taking his insulin. Briefly explained not eating enough CHO for breakfast and examples.

## 2025-05-06 ENCOUNTER — CLINICAL SUPPORT (OUTPATIENT)
Age: 75
End: 2025-05-06
Payer: MEDICARE

## 2025-05-06 DIAGNOSIS — E11.9 DIABETES MELLITUS WITHOUT COMPLICATION (HCC): Primary | ICD-10-CM

## 2025-05-06 PROCEDURE — G0109 DIAB MANAGE TRN IND/GROUP: HCPCS

## 2025-05-06 NOTE — PROGRESS NOTES
[facility]  Diabetes Self-Management Education & Support Program  Encounter Note      SUMMARY  Diabetes self-care management training was completed related to reducing risks. he participant will return on May 13 to continue DSMES related to healthy eating and monitoring. The participant did identify SMART Goal(s) and will practice knowledge and skills related to reducing risks to improve diabetes self-management.        EVALUATION:  Patient participated in the class.     Foot care = not checking his feet daily. As educated today , check his feet every day.    CGM = approved by his provider , he sent a prescription for Free Style jorge 3.  Planning to educated patient after class.     RECOMMENDATIONS:  - continue with life style changes  - check feet every day.     TOPICS DISCUSSED TODAY:  WHAT IS DIABETES? Minutes: 60  HOW DO I STAY HEALTHY? 60      Next provider visit is scheduled : not book at this time.        SMART GOAL(S)   Start filling the diabetes care record.   ACHIEVEMENT OF GOAL(S) : 0-24%         DATE DSMES TOPIC EVALUATION     5/6/2025 WHAT IS DIABETES?   Role of the normal pancreas in energy balance and blood glucose control   The defect seen in diabetes   Signs & symptoms of diabetes   Diagnosis of diabetes   Types of diabetes   Blood glucose targets in non-pregnant & non-pregnant adults       The participant knows  Their type of diabetes: Yes   The basic physiologic defect: Yes  Blood glucose targets: Yes     DATE DSMES TOPIC EVALUATION     5/6/2025 HOW DO I STAY HEALTHY?   Prevention   Vaccinations   Preconception care (if applicable)  Examinations   Eye    Foot   Diabetic complications' prevention   Dental health   Heart health   Kidney Health   Nerve health   Sleep health      The participant has a personal diabetes care record to keep abreast of diabetes health Yes     The participant needs to address : fill the diabetes care record.          Leslie Bruner RN on

## 2025-05-07 ENCOUNTER — OFFICE VISIT (OUTPATIENT)
Age: 75
End: 2025-05-07
Payer: MEDICARE

## 2025-05-07 DIAGNOSIS — E11.9 DIABETES MELLITUS WITHOUT COMPLICATION (HCC): Primary | ICD-10-CM

## 2025-05-07 PROCEDURE — G0108 DIAB MANAGE TRN  PER INDIV: HCPCS

## 2025-05-07 NOTE — PROGRESS NOTES
[facility]  Diabetes Self-Management Education & Support Program  Encounter Note      SUMMARY  Diabetes self-care management training was completed related to monitoring. The participant will return on May 13 to continue DSMES related to healthy eating and monitoring, DM classes. The participant did identify SMART Goal(s) and will practice knowledge and skills related to monitoring to improve diabetes self-management.        EVALUATION:  Blood sugars= states since he is eating dinner , didn't have no other episodes of low blood sugars.   States bs fasting in AM , usually on the 70's -80's even if he eats dinner.  Advised if continues with 70's in am to contact his provider. May need insulin adjustments.     DM classes = states he learned a lot on his first diabetes group class.     CGM = able to demonstrate correctly how to apply . Discussed how to use the maribeth and written materials given.     RECOMMENDATIONS:  - continue with lifestyle changes  - monitor bs with sensor , if alarm goes off check bs with glucometer.      TOPICS DISCUSSED TODAY:  HOW CAN BLOOD GLUCOSE MONITORING HELP ME? 30      Next provider visit is scheduled : appointment not booked at this time.        SMART GOAL(S)   Will monitor bs with CGM .   ACHIEVEMENT OF GOAL(S) : 0-24%         DATE DSMES TOPIC EVALUATION     5/7/2025 HOW CAN BLOOD GLUCOSE MONITORING HELP ME?   Value of blood glucose monitoring   Realistic expectations   Blood glucose monitoring targets     Taught how to use Free Style jorge 2 , CGM :   Selecting a sensor site  Preparing the skins surface  Preparing sensor  Applying the sensor   How to use the phone maribeth        6.   MRI & sensor         7.   How to read bs in maribeth        8.   Distance from phone, reader.         9.  Meaning of trend arrows       10.Jorge linkup- added to practice ID.        11. Check bs with glucometer when sensor alarm goes off or if bs high or low.        12. Remove Sensor

## 2025-05-13 ENCOUNTER — CLINICAL SUPPORT (OUTPATIENT)
Age: 75
End: 2025-05-13
Payer: MEDICARE

## 2025-05-13 DIAGNOSIS — E11.9 DIABETES MELLITUS WITHOUT COMPLICATION (HCC): Primary | ICD-10-CM

## 2025-05-13 PROCEDURE — G0109 DIAB MANAGE TRN IND/GROUP: HCPCS

## 2025-05-13 NOTE — PROGRESS NOTES
[facility]  Diabetes Self-Management Education & Support Program  Encounter Note      SUMMARY  Diabetes self-care management training was completed related to healthy eating and monitoring. he participant will return on May 20 to continue DSMES related to taking medications and physical activity. The participant did identify SMART Goal(s) and will practice knowledge and skills related to healthy eating to improve diabetes self-management.        EVALUATION:  Blood Sugars= Have a sensor , free style ojrge #3, Target Range= 91 %, low = 1 % , High= 7 % , very high= 1% . Average glucose= 131 mg/dl, GMI= 6.4 % . Low bs with no sx , not sure if pressure on the sensor. Advised to check with glucometer when have low bs or sensor alarm. CMG downloaded and discussed with patient.     Healthy eating= patient with good feedback in class , counting CHO.     RECOMMENDATIONS:  - continue with lifestyle changes  - practice CHO counting     TOPICS DISCUSSED TODAY:  WHAT CAN I EAT? 60  HOW CAN BLOOD GLUCOSE MONITORING HELP ME? 60      Next provider visit is scheduled : not book at this time.         SMART GOAL(S)   Practice counting CHO .   ACHIEVEMENT OF GOAL(S) : 0-24%         DATE DSMES TOPIC EVALUATION     5/13/2025 WHAT CAN I EAT?   General principles   Determining a healthy weight   Nutritional terms & tools   Healthy Plate method   Carbohydrate Counting   Reading food labels   Free apps   Pregnancy recommendations      The participant   Uses Healthy Plate principles in constructing meals: Yes  Reads food labels in choosing acceptable foods: Yes    The participant needs to address : Practice counting CHO and reading food labels.     DATE DSMES TOPIC EVALUATION     5/13/2025 HOW CAN BLOOD GLUCOSE MONITORING HELP ME?   Value of blood glucose monitoring   Realistic expectations   Blood glucose monitoring targets   Target adjustments   Setting a1c & blood glucose targets with provider   Meter selection

## 2025-05-20 ENCOUNTER — CLINICAL SUPPORT (OUTPATIENT)
Age: 75
End: 2025-05-20
Payer: MEDICARE

## 2025-05-20 DIAGNOSIS — E11.9 DIABETES MELLITUS WITHOUT COMPLICATION (HCC): Primary | ICD-10-CM

## 2025-05-20 PROCEDURE — G0108 DIAB MANAGE TRN  PER INDIV: HCPCS

## 2025-05-20 NOTE — PROGRESS NOTES
[facility]  Diabetes Self-Management Education & Support Program  Encounter Note      SUMMARY  Diabetes self-care management training was completed related to taking medications and physical activity. he participant will return on May 27 to continue DSMES related to healthy coping and problem solving. The participant did identify SMART Goal(s) and will practice knowledge and skills related to being active to improve diabetes self-management.        EVALUATION:  Participated in class.    Blood sugars: Target Range= 83 % , High = 13 %, very high= 3 %, Low= 1 % , GMI= 6.7 %  Medication: Insulin , was not cleaning the pen rubber , will start .     RECOMMENDATIONS:  - continue with lifestyle changes      TOPICS DISCUSSED TODAY:  HOW DO MY DIABETES MEDICATIONS WORK? 60  HOW DOES PHYSICAL ACTIVITY AFFECT MY DIABETES? 60      Next provider visit is scheduled : not book at this time.       SMART GOAL(S)   Will do balance exercise- 1-2 x per week.   ACHIEVEMENT OF GOAL(S) : 0-24%    2.    Will increase his walking from 1.0 miles to 1.5 miles.   ACHIEVEMENT OF GOAL(S) :  0-24%       DATE DSMES TOPIC EVALUATION     5/20/2025 HOW DO MY DIABETES MEDICATIONS WORK?   Type 1 medications & methods   Insulin injections   Injection sites   Type 2 medications   Oral agents   GLP-1 agonists   Hypoglycemia symptoms & treatment   Glucagon emergency kits   General guidance regarding insulin use whether Type 1, 2 or gestational diabetes   Storage of insulin   Disposal    Traveling with medications   Barriers to medication adherence      The participant   Can describe the expected action & side effects of prescribed diabetes medications: Yes  Can demonstrate injection technique (if applicable): No    The participant needs to address: continue with lifestyle changes.     Blood sugars= per sensor bs on the 60's, when he check with glucometer, bs = 82 mg/dl at 6:30 am.   Advice if bs consistent on the 80's to notify

## 2025-05-27 ENCOUNTER — CLINICAL SUPPORT (OUTPATIENT)
Age: 75
End: 2025-05-27
Payer: MEDICARE

## 2025-05-27 DIAGNOSIS — E11.9 DIABETES MELLITUS WITHOUT COMPLICATION (HCC): Primary | ICD-10-CM

## 2025-05-27 PROCEDURE — G0109 DIAB MANAGE TRN IND/GROUP: HCPCS

## 2025-05-27 NOTE — PROGRESS NOTES
[facility]  Diabetes Self-Management Education & Support Program  Encounter Note      SUMMARY  Diabetes self-care management training was completed related to healthy coping and problem solving. he participant will return on July 08 for the 6 weeks follow up. The participant did identify SMART Goal(s) and will practice knowledge and skills related to healthy eating and being active to improve diabetes self-management.      EVALUATION:  Blood sugars= per Free Style jorge #3 sensor download: Target Range = 69 % , low =2%, High= 25%, very High = 4% .  Average Glucose = 150 mg/dl , GMI= 6.9 % . Pt states the sensor alarm goes off in AM with bs less than 70's, when he check with the glucometer the blood sugars are on the 80's. Advised to contact provider and let him know, in case that would like to do medication adjustment.      Healthy Eating = following the plate method.       RECOMMENDATIONS:  - continue with lifestyle changes.   - Notify PCP about his fasting blood sugars     TOPICS DISCUSSED TODAY:  HOW DO I FIND SUPPORT TO TACKLE THIS CONDITION? 60  HOW DO I FIGURE OUT WHAT'S INFLUENCING MY BLOOD GLUCOSES? 60      Next provider visit is scheduled : not schedule at this time.          SMART GOAL(S)   Exercise= walk twice per week. Do chair aerobics tree times per week.   ACHIEVEMENT OF GOAL(S) : 0-24%    2.    \"Count carbs for each meal,set alarm on phone calendar for every day to read labels, measure portions. 45 minutes  ,2 x per week (each meal, snack).\"   ACHIEVEMENT OF GOAL(S) :  0-24%         DATE DSMES TOPIC EVALUATION     5/27/2025 HOW DO I FIND SUPPORT TO TACKLE THIS CONDITION?   Normal responses to diabetes diagnosis or complication   Shock   Anger & resentment   Guilt/self-blame   Sadness & worry   Depression    Anxiety     Constructive strategies to normal responses    Exploring feelings & attitudes   Motivation: Cost versus benefits analysis   Problem-solving: Chain analysis

## 2025-05-29 ENCOUNTER — TELEPHONE (OUTPATIENT)
Age: 75
End: 2025-05-29

## 2025-05-29 NOTE — TELEPHONE ENCOUNTER
[facility]  TELEPHONE CALL   05/29/25  3:52 PM    I called patient Stephen Donaldson Jr. , today ,05/29/25,3:52 PM. Patient is on insulin and he had a sample for the Free Style jorge#3.  He was supposed to have other sensors by prescription. Calling f/u that he will have sensors after the sample. His PCP sent a prescription, see copy on Media.      Patient states he check with the pharmacy yesterday again, they didn't have it ready .He talked to his provider Dr Adames. States had another sample from his provider office.   Now patient will have his sensors from the pharmacy,after follow up. States he was told by the pharmacy, they will work on the prescription for him to .   States the pharmacy it's been taking 3 weeks for the sensor prescription.    States he did informed Dr Adames nurse about his blood sugars been on the 80's in the morning. Advice to follow up.     Advised to call JENNIFER, BECKY ROMERO  Certified Diabetes and    [facility]                                     Leslie ROMERO  Certified Diabetes and    [facility]   Tel  [phone]

## 2025-07-08 ENCOUNTER — OFFICE VISIT (OUTPATIENT)
Age: 75
End: 2025-07-08
Payer: MEDICARE

## 2025-07-08 ENCOUNTER — TELEPHONE (OUTPATIENT)
Age: 75
End: 2025-07-08

## 2025-07-08 DIAGNOSIS — E11.9 DIABETES MELLITUS WITHOUT COMPLICATION (HCC): Primary | ICD-10-CM

## 2025-07-08 PROCEDURE — G0108 DIAB MANAGE TRN  PER INDIV: HCPCS

## 2025-07-08 NOTE — PROGRESS NOTES
DIABETES SKILLS/KNOWLEDGE  I know how to plan meals that have the best balance between carbohydrates, proteins, and vegetables.: (Patient-Rptd) 6 (7/5/2025  1:48 PM)  I know how my diabetes medications (pills, injectables and/or insulin) work in my body.: (Patient-Rptd) 6 (7/5/2025  1:48 PM)  I know when to check my blood sugar if I want to see how my body responded to a meal.: (Patient-Rptd) 6 (7/5/2025  1:48 PM)  I know when to check my blood sugars to determine if my medication or insulin doses are correct.: (Patient-Rptd) 5 (7/5/2025  1:48 PM)  I know what to do to prevent a low blood sugar when I exercise (either before, during, or after).: (Patient-Rptd) 6 (7/5/2025  1:48 PM)  When I am sick, I know what to do differently with my diabetes management.: (Patient-Rptd) 7 (7/5/2025  1:48 PM)  I know how stress can affect my diabetes management.: (Patient-Rptd) 7 (7/5/2025  1:48 PM)  When I look at my blood sugars over a given week, I can explain what my blood sugar pattern is.: (Patient-Rptd) 7 (7/5/2025  1:48 PM)  I know what my target levels are for A1c, blood pressure and cholesterol.: (Patient-Rptd) 7 (7/5/2025  1:48 PM)    [facility] DIABETES CONFIDENCE  I am confident that I can plan balanced meals and snacks.: (Patient-Rptd) 7 (7/5/2025  1:48 PM)  I am confident that I can manage my stress.: (Patient-Rptd) 6 (7/5/2025  1:48 PM)  I am confident that I can prevent a low blood sugar during or after exercise.: (Patient-Rptd) 6 (7/5/2025  1:48 PM)  I am confident that the next time I eat out, I will be able to choose foods that best keep my blood sugars in target.: (Patient-Rptd) 7 (7/5/2025  1:48 PM)  I am confident I can include exercise into my schedule.: (Patient-Rptd) 7 (7/5/2025  1:48 PM)  I am confident that I can use my daily blood sugars to adjust my diet, my activity, and/or my insulin.: (Patient-Rptd) 7 (7/5/2025  1:48 PM)  When something out of my normal routine happens, I am confident that I can

## 2025-07-08 NOTE — TELEPHONE ENCOUNTER
[facility]  TELEPHONE CALL   07/08/25  3:14 PM    After office visit today, I called the office of Dr Rosangela MD, PCP .  Left message with Ms Herrmann, regarding patient concern about his lower bs around 4 AM . States sensor alarm goes off and read bs on the 60's (no sx of low bs) , Per glucometer blood sugars then 88-97 mg/dl. States he tx the blood sugars at the time , with glucose tablets. States if he doesn't take them then he have a low blood sugars later. States after he takes the glucose tab= 4 tabs, his bs is like 137 mg/dl in AM . States bs not below 70's. He is taking , Glipizide XL 10 mg once a day.     Patient recently started the Toujeou 26 units in AM and 16 units in PM.   He stop the 26 units in AM, because he was having low blood sugars, however he states now the bs are going up in the evening.   Patient will benefit from a lower dose of insulin bid.   Patient advice that if he stop a medication or having adverse effects to call his provider ASAP.     Ms Herrmann states , Dr Adames is out of the office for this afternoon, will receive message tomorrow.     States he now takes 3 meals a day , he was not doing the right meals times before he took the classes.  States lost 5 lbs in addition to previous wt loss of 30 lbs. Was on ozempic then.     I called patient Stephen Donaldson Jr. , today . [facility] , informed in message , I called Dr Adames office and left a message.         Leslie Bruner RN [facility]  Certified Diabetes and    [facility]   Tel  [phone]

## (undated) DEVICE — SOLUTION IRRIGATION H2O 0797305] ICU MEDICAL INC]

## (undated) DEVICE — HYPODERMIC SAFETY NEEDLE: Brand: MONOJECT

## (undated) DEVICE — SNARE ENDOSCP M L240CM W27MM SHTH DIA2.4MM CHN 2.8MM OVL

## (undated) DEVICE — TAPE,CLOTH/SILK,CURAD,3"X10YD,LF,40/CS: Brand: CURAD

## (undated) DEVICE — SOLUTION IV 250ML 0.9% SOD CHL CLR INJ FLX BG CONT PRT CLSR

## (undated) DEVICE — CATHETER ETER IV 22GA L1IN POLYUR STR RADPQ INTROCAN SFTY

## (undated) DEVICE — SYR 10ML LUER LOK 1/5ML GRAD --

## (undated) DEVICE — SYR LR LCK 1ML GRAD NSAF 30ML --

## (undated) DEVICE — AIRSEAL 12 MM ACCESS PORT AND PALM GRIP OBTURATOR WITH BLADELESS OPTICAL TIP, 120 MM LENGTH: Brand: AIRSEAL

## (undated) DEVICE — AGENT VISCOELASTIC SODIUM HYALURONATE 10 MG/ML PROVISC

## (undated) DEVICE — Device

## (undated) DEVICE — PAD BD MATTRESS 73X32 IN STD CONVOLUTED FOAM LTX FREE

## (undated) DEVICE — KENDALL DL 5 LEADS DUAL CONNECT SYSTEM BLENDED CASE - SINGLE-PATIENT-USE: Brand: SUSTAINABLE TECHNOLOGIES

## (undated) DEVICE — TRAP SURG QUAD PARABOLA SLOT DSGN SFTY SCRN TRAPEASE

## (undated) DEVICE — DEVICE SECUREMENT 1/32IN POLYETH FOAM F ANCHR URIN CATH

## (undated) DEVICE — SLIT FULL HDL-2.8MM ANG C-CUT: Brand: SHARPOINT

## (undated) DEVICE — ELECTRO LUBE IS A SINGLE PATIENT USE DEVICE THAT IS INTENDED TO BE USED ON ELECTROSURGICAL ELECTRODES TO REDUCE STICKING.: Brand: KEY SURGICAL ELECTRO LUBE

## (undated) DEVICE — BAG SPEC REM 224ML W4XL6IN DIA10MM 1 HND GYN DISP ENDOPCH

## (undated) DEVICE — PAD PT POS 36 IN SURGYPAD DISP

## (undated) DEVICE — ABSORBABLE LIGATING CLIP CARTRIDGE: Brand: LAPRO-CLIP

## (undated) DEVICE — ADULT SPO2 SENSOR: Brand: NELLCOR

## (undated) DEVICE — INSUFFLATION NEEDLE TO ESTABLISH PNEUMOPERITONEUM.: Brand: INSUFFLATION NEEDLE

## (undated) DEVICE — COVER MPLR TIP CRV SCIS ACC DA VINCI

## (undated) DEVICE — SOLUTION IRRIG 500ML STRL H2O NONPYROGENIC

## (undated) DEVICE — GLOVE SURG SZ 8 L12IN FNGR THK94MIL STD WHT LTX FREE

## (undated) DEVICE — GLOVE SURG SZ 65 THK91MIL LTX FREE SYN POLYISOPRENE

## (undated) DEVICE — PACK CATRCT CUST AS835752] ALCON LABORATORIES INC]

## (undated) DEVICE — SUTURE VCRL SZ 0 L36IN ABSRB VLT L36MM CT-1 1/2 CIR J346H

## (undated) DEVICE — BLADELESS OBTURATOR: Brand: WECK VISTA

## (undated) DEVICE — GARMENT,MEDLINE,DVT,INT,CALF,MED, GEN2: Brand: MEDLINE

## (undated) DEVICE — SUTURE MCRYL SZ 3-0 L27IN ABSRB UD L19MM PS-2 3/8 CIR PRIM Y427H

## (undated) DEVICE — SNARE ENDOSCP POLYP 2.4 MM 240 CM 10 MM 2.8 MM CAPTIVATOR

## (undated) DEVICE — TRI-LUMEN FILTERED TUBE SET WITH ACTIVATED CHARCOAL FILTER: Brand: AIRSEAL

## (undated) DEVICE — NEEDLE HYPO 30GA L0.5IN BGE POLYPR HUB S STL REG BVL STR

## (undated) DEVICE — SUTURE V-LOC 180 SZ 3-0 L6IN ABSRB VLT CV-23 L17MM 1/2 CIR VLOCM0804

## (undated) DEVICE — VISUALIZATION SYSTEM: Brand: CLEARIFY

## (undated) DEVICE — DERMABOND SKIN ADH 0.7ML -- DERMABOND ADVANCED 12/BX

## (undated) DEVICE — ARM DRAPE

## (undated) DEVICE — DAVINCI PROSTATECTOMY-SMH: Brand: MEDLINE INDUSTRIES, INC.

## (undated) DEVICE — BLADE ASSEMB CLP HAIR FINE --

## (undated) DEVICE — SEAL UNIV 5-8MM DISP BX/10 -- DA VINCI XI - SNGL USE

## (undated) DEVICE — SUTURE PDS II SZ 1 L27IN ABSRB VLT CT-1 L36MM 1/2 CIR Z341H